# Patient Record
Sex: FEMALE | Race: WHITE | Employment: OTHER | URBAN - METROPOLITAN AREA
[De-identification: names, ages, dates, MRNs, and addresses within clinical notes are randomized per-mention and may not be internally consistent; named-entity substitution may affect disease eponyms.]

---

## 2017-01-19 ENCOUNTER — HOSPITAL ENCOUNTER (OUTPATIENT)
Dept: SLEEP CENTER | Age: 67
Discharge: HOME | End: 2017-01-04
Attending: INTERNAL MEDICINE | Admitting: INTERNAL MEDICINE

## 2017-03-10 ENCOUNTER — HOSPITAL ENCOUNTER (OUTPATIENT)
Dept: SLEEP CENTER | Age: 67
Discharge: HOME | End: 2017-03-10
Attending: INTERNAL MEDICINE | Admitting: INTERNAL MEDICINE

## 2017-04-10 RX ORDER — ATORVASTATIN CALCIUM 20 MG/1
TABLET, FILM COATED ORAL
Qty: 30 TABLET | Refills: 4 | Status: SHIPPED | OUTPATIENT
Start: 2017-04-10 | End: 2017-09-06 | Stop reason: SDUPTHER

## 2017-05-11 RX ORDER — LEVOTHYROXINE SODIUM 112 UG/1
TABLET ORAL
Qty: 30 TABLET | Refills: 2 | Status: SHIPPED | OUTPATIENT
Start: 2017-05-11 | End: 2017-06-16 | Stop reason: SDUPTHER

## 2017-05-16 ENCOUNTER — HOSPITAL ENCOUNTER (OUTPATIENT)
Dept: WOMENS IMAGING | Age: 67
Discharge: HOME | End: 2017-05-16
Attending: NURSE PRACTITIONER | Admitting: NURSE PRACTITIONER

## 2017-05-16 DIAGNOSIS — E78.5 HYPERLIPIDEMIA: ICD-10-CM

## 2017-05-16 DIAGNOSIS — Z13.820 SCREENING FOR OSTEOPOROSIS: ICD-10-CM

## 2017-05-22 DIAGNOSIS — F39 MOOD DISORDER (HCC): ICD-10-CM

## 2017-05-22 RX ORDER — FLUOXETINE 10 MG/1
10 CAPSULE ORAL DAILY
Qty: 30 CAPSULE | Refills: 3 | Status: SHIPPED | OUTPATIENT
Start: 2017-05-22 | End: 2017-09-22 | Stop reason: SDUPTHER

## 2017-06-15 ENCOUNTER — TELEPHONE (OUTPATIENT)
Dept: INTERNAL MEDICINE CLINIC | Age: 67
End: 2017-06-15

## 2017-06-16 ENCOUNTER — OFFICE VISIT (OUTPATIENT)
Dept: INTERNAL MEDICINE CLINIC | Age: 67
End: 2017-06-16

## 2017-06-16 VITALS
BODY MASS INDEX: 40.71 KG/M2 | RESPIRATION RATE: 16 BRPM | HEART RATE: 78 BPM | SYSTOLIC BLOOD PRESSURE: 138 MMHG | DIASTOLIC BLOOD PRESSURE: 68 MMHG | WEIGHT: 229.8 LBS

## 2017-06-16 DIAGNOSIS — K51.90 CHRONIC ULCERATIVE COLITIS, WITHOUT COMPLICATIONS (HCC): ICD-10-CM

## 2017-06-16 DIAGNOSIS — R60.9 FLUID RETENTION: Primary | ICD-10-CM

## 2017-06-16 DIAGNOSIS — E66.01 MORBID OBESITY WITH BMI OF 40.0-44.9, ADULT (HCC): ICD-10-CM

## 2017-06-16 DIAGNOSIS — M85.80 OSTEOPENIA: ICD-10-CM

## 2017-06-16 DIAGNOSIS — R42 DIZZINESS: ICD-10-CM

## 2017-06-16 PROCEDURE — G8399 PT W/DXA RESULTS DOCUMENT: HCPCS | Performed by: INTERNAL MEDICINE

## 2017-06-16 PROCEDURE — 3014F SCREEN MAMMO DOC REV: CPT | Performed by: INTERNAL MEDICINE

## 2017-06-16 PROCEDURE — 99214 OFFICE O/P EST MOD 30 MIN: CPT | Performed by: INTERNAL MEDICINE

## 2017-06-16 PROCEDURE — 4040F PNEUMOC VAC/ADMIN/RCVD: CPT | Performed by: INTERNAL MEDICINE

## 2017-06-16 PROCEDURE — G8419 CALC BMI OUT NRM PARAM NOF/U: HCPCS | Performed by: INTERNAL MEDICINE

## 2017-06-16 PROCEDURE — 1123F ACP DISCUSS/DSCN MKR DOCD: CPT | Performed by: INTERNAL MEDICINE

## 2017-06-16 PROCEDURE — 3017F COLORECTAL CA SCREEN DOC REV: CPT | Performed by: INTERNAL MEDICINE

## 2017-06-16 PROCEDURE — 1036F TOBACCO NON-USER: CPT | Performed by: INTERNAL MEDICINE

## 2017-06-16 PROCEDURE — 1090F PRES/ABSN URINE INCON ASSESS: CPT | Performed by: INTERNAL MEDICINE

## 2017-06-16 PROCEDURE — G8427 DOCREV CUR MEDS BY ELIG CLIN: HCPCS | Performed by: INTERNAL MEDICINE

## 2017-06-16 RX ORDER — HYDROCHLOROTHIAZIDE 12.5 MG/1
12.5 CAPSULE, GELATIN COATED ORAL EVERY OTHER DAY
Qty: 30 CAPSULE | Refills: 3 | Status: SHIPPED | OUTPATIENT
Start: 2017-06-16 | End: 2018-01-18 | Stop reason: DRUGHIGH

## 2017-08-07 RX ORDER — LEVOTHYROXINE SODIUM 112 UG/1
TABLET ORAL
Qty: 30 TABLET | Refills: 1 | Status: SHIPPED | OUTPATIENT
Start: 2017-08-07 | End: 2017-09-06 | Stop reason: SDUPTHER

## 2017-09-07 RX ORDER — ATORVASTATIN CALCIUM 20 MG/1
20 TABLET, FILM COATED ORAL DAILY
Qty: 30 TABLET | Refills: 5 | Status: SHIPPED | OUTPATIENT
Start: 2017-09-07 | End: 2018-03-01 | Stop reason: SDUPTHER

## 2017-09-07 RX ORDER — LEVOTHYROXINE SODIUM 112 UG/1
112 TABLET ORAL DAILY
Qty: 30 TABLET | Refills: 5 | Status: SHIPPED | OUTPATIENT
Start: 2017-09-07 | End: 2017-09-18 | Stop reason: SDUPTHER

## 2017-09-15 ENCOUNTER — TELEPHONE (OUTPATIENT)
Dept: INTERNAL MEDICINE CLINIC | Age: 67
End: 2017-09-15

## 2017-09-18 ENCOUNTER — OFFICE VISIT (OUTPATIENT)
Dept: INTERNAL MEDICINE CLINIC | Age: 67
End: 2017-09-18

## 2017-09-18 VITALS
HEART RATE: 80 BPM | WEIGHT: 228.2 LBS | BODY MASS INDEX: 40.42 KG/M2 | DIASTOLIC BLOOD PRESSURE: 76 MMHG | SYSTOLIC BLOOD PRESSURE: 142 MMHG | RESPIRATION RATE: 16 BRPM

## 2017-09-18 DIAGNOSIS — R03.0 PREHYPERTENSION: Primary | ICD-10-CM

## 2017-09-18 DIAGNOSIS — Z13.1 DIABETES MELLITUS SCREENING: ICD-10-CM

## 2017-09-18 DIAGNOSIS — E03.9 HYPOTHYROIDISM, UNSPECIFIED TYPE: ICD-10-CM

## 2017-09-18 DIAGNOSIS — E78.2 MIXED HYPERLIPIDEMIA: ICD-10-CM

## 2017-09-18 DIAGNOSIS — F33.42 RECURRENT MAJOR DEPRESSIVE EPISODES, IN FULL REMISSION (HCC): ICD-10-CM

## 2017-09-18 PROCEDURE — 1090F PRES/ABSN URINE INCON ASSESS: CPT | Performed by: INTERNAL MEDICINE

## 2017-09-18 PROCEDURE — 3017F COLORECTAL CA SCREEN DOC REV: CPT | Performed by: INTERNAL MEDICINE

## 2017-09-18 PROCEDURE — 99214 OFFICE O/P EST MOD 30 MIN: CPT | Performed by: INTERNAL MEDICINE

## 2017-09-18 PROCEDURE — G8399 PT W/DXA RESULTS DOCUMENT: HCPCS | Performed by: INTERNAL MEDICINE

## 2017-09-18 PROCEDURE — 3014F SCREEN MAMMO DOC REV: CPT | Performed by: INTERNAL MEDICINE

## 2017-09-18 PROCEDURE — G8427 DOCREV CUR MEDS BY ELIG CLIN: HCPCS | Performed by: INTERNAL MEDICINE

## 2017-09-18 PROCEDURE — 1123F ACP DISCUSS/DSCN MKR DOCD: CPT | Performed by: INTERNAL MEDICINE

## 2017-09-18 PROCEDURE — 4040F PNEUMOC VAC/ADMIN/RCVD: CPT | Performed by: INTERNAL MEDICINE

## 2017-09-18 PROCEDURE — 1036F TOBACCO NON-USER: CPT | Performed by: INTERNAL MEDICINE

## 2017-09-18 PROCEDURE — G8417 CALC BMI ABV UP PARAM F/U: HCPCS | Performed by: INTERNAL MEDICINE

## 2017-09-22 DIAGNOSIS — F39 MOOD DISORDER (HCC): ICD-10-CM

## 2017-09-22 RX ORDER — FLUOXETINE 10 MG/1
CAPSULE ORAL
Qty: 30 CAPSULE | Refills: 2 | Status: SHIPPED | OUTPATIENT
Start: 2017-09-22 | End: 2017-12-15 | Stop reason: SDUPTHER

## 2017-12-15 DIAGNOSIS — F39 MOOD DISORDER (HCC): ICD-10-CM

## 2017-12-15 RX ORDER — FLUOXETINE 10 MG/1
CAPSULE ORAL
Qty: 30 CAPSULE | Refills: 3 | Status: SHIPPED | OUTPATIENT
Start: 2017-12-15 | End: 2018-03-22 | Stop reason: SDUPTHER

## 2018-01-18 ENCOUNTER — OFFICE VISIT (OUTPATIENT)
Dept: INTERNAL MEDICINE CLINIC | Age: 68
End: 2018-01-18

## 2018-01-18 VITALS
HEART RATE: 72 BPM | RESPIRATION RATE: 16 BRPM | BODY MASS INDEX: 40.53 KG/M2 | DIASTOLIC BLOOD PRESSURE: 64 MMHG | SYSTOLIC BLOOD PRESSURE: 138 MMHG | WEIGHT: 228.8 LBS

## 2018-01-18 DIAGNOSIS — E03.9 HYPOTHYROIDISM, UNSPECIFIED TYPE: ICD-10-CM

## 2018-01-18 DIAGNOSIS — I10 ESSENTIAL HYPERTENSION: Primary | ICD-10-CM

## 2018-01-18 DIAGNOSIS — K51.30 ULCERATIVE RECTOSIGMOIDITIS WITHOUT COMPLICATION (HCC): ICD-10-CM

## 2018-01-18 PROCEDURE — G8417 CALC BMI ABV UP PARAM F/U: HCPCS | Performed by: INTERNAL MEDICINE

## 2018-01-18 PROCEDURE — 3014F SCREEN MAMMO DOC REV: CPT | Performed by: INTERNAL MEDICINE

## 2018-01-18 PROCEDURE — 99214 OFFICE O/P EST MOD 30 MIN: CPT | Performed by: INTERNAL MEDICINE

## 2018-01-18 PROCEDURE — 1036F TOBACCO NON-USER: CPT | Performed by: INTERNAL MEDICINE

## 2018-01-18 PROCEDURE — 3017F COLORECTAL CA SCREEN DOC REV: CPT | Performed by: INTERNAL MEDICINE

## 2018-01-18 PROCEDURE — G8484 FLU IMMUNIZE NO ADMIN: HCPCS | Performed by: INTERNAL MEDICINE

## 2018-01-18 PROCEDURE — 1090F PRES/ABSN URINE INCON ASSESS: CPT | Performed by: INTERNAL MEDICINE

## 2018-01-18 PROCEDURE — G8399 PT W/DXA RESULTS DOCUMENT: HCPCS | Performed by: INTERNAL MEDICINE

## 2018-01-18 PROCEDURE — 1123F ACP DISCUSS/DSCN MKR DOCD: CPT | Performed by: INTERNAL MEDICINE

## 2018-01-18 PROCEDURE — 4040F PNEUMOC VAC/ADMIN/RCVD: CPT | Performed by: INTERNAL MEDICINE

## 2018-01-18 PROCEDURE — G8427 DOCREV CUR MEDS BY ELIG CLIN: HCPCS | Performed by: INTERNAL MEDICINE

## 2018-01-18 RX ORDER — FLUTICASONE PROPIONATE 50 MCG
2 SPRAY, SUSPENSION (ML) NASAL DAILY
Qty: 1 BOTTLE | Refills: 5 | Status: SHIPPED | OUTPATIENT
Start: 2018-01-18 | End: 2019-02-25 | Stop reason: SDUPTHER

## 2018-01-18 RX ORDER — LOSARTAN POTASSIUM 25 MG/1
25 TABLET ORAL NIGHTLY
Qty: 30 TABLET | Refills: 3 | Status: SHIPPED | OUTPATIENT
Start: 2018-01-18 | End: 2018-05-17 | Stop reason: SDUPTHER

## 2018-01-18 RX ORDER — HYDROCHLOROTHIAZIDE 12.5 MG/1
12.5 CAPSULE, GELATIN COATED ORAL DAILY
COMMUNITY
End: 2018-01-19 | Stop reason: SDUPTHER

## 2018-01-19 RX ORDER — HYDROCHLOROTHIAZIDE 12.5 MG/1
12.5 CAPSULE, GELATIN COATED ORAL DAILY
Qty: 30 CAPSULE | Refills: 5 | Status: SHIPPED | OUTPATIENT
Start: 2018-01-19 | End: 2018-07-21 | Stop reason: SDUPTHER

## 2018-02-27 ENCOUNTER — HOSPITAL ENCOUNTER (OUTPATIENT)
Dept: LAB | Age: 68
Discharge: HOME | End: 2018-02-27
Attending: INTERNAL MEDICINE | Admitting: INTERNAL MEDICINE

## 2018-02-27 LAB
ANION GAP SERPL CALCULATED.3IONS-SCNC: 10 MMOL/L (ref 4–16)
BUN BLDV-MCNC: 13 MG/DL (ref 6–23)
CALCIUM SERPL-MCNC: 9.9 MG/DL (ref 8.3–10.6)
CHLORIDE BLD-SCNC: 100 MMOL/L (ref 99–110)
CO2: 32 MMOL/L (ref 21–32)
CREAT SERPL-MCNC: 0.8 MG/DL (ref 0.6–1.1)
GFR AFRICAN AMERICAN: >60 ML/MIN/1.73M2
GFR NON-AFRICAN AMERICAN: >60 ML/MIN/1.73M2
GLUCOSE BLD-MCNC: 114 MG/DL (ref 70–99)
POTASSIUM SERPL-SCNC: 4.3 MMOL/L (ref 3.5–5.1)
SODIUM BLD-SCNC: 142 MMOL/L (ref 135–145)

## 2018-03-01 RX ORDER — ATORVASTATIN CALCIUM 20 MG/1
20 TABLET, FILM COATED ORAL DAILY
Qty: 30 TABLET | Refills: 5 | Status: SHIPPED | OUTPATIENT
Start: 2018-03-01 | End: 2018-09-04 | Stop reason: SDUPTHER

## 2018-03-22 ENCOUNTER — OFFICE VISIT (OUTPATIENT)
Dept: INTERNAL MEDICINE CLINIC | Age: 68
End: 2018-03-22

## 2018-03-22 VITALS
HEART RATE: 76 BPM | BODY MASS INDEX: 39.35 KG/M2 | HEIGHT: 64 IN | DIASTOLIC BLOOD PRESSURE: 80 MMHG | RESPIRATION RATE: 16 BRPM | WEIGHT: 230.5 LBS | SYSTOLIC BLOOD PRESSURE: 132 MMHG

## 2018-03-22 DIAGNOSIS — I10 ESSENTIAL HYPERTENSION: Primary | ICD-10-CM

## 2018-03-22 DIAGNOSIS — F32.A ANXIETY AND DEPRESSION: ICD-10-CM

## 2018-03-22 DIAGNOSIS — F41.9 ANXIETY AND DEPRESSION: ICD-10-CM

## 2018-03-22 DIAGNOSIS — K51.30 ULCERATIVE RECTOSIGMOIDITIS WITHOUT COMPLICATION (HCC): ICD-10-CM

## 2018-03-22 DIAGNOSIS — F39 MOOD DISORDER (HCC): ICD-10-CM

## 2018-03-22 DIAGNOSIS — R73.03 PREDIABETES: ICD-10-CM

## 2018-03-22 DIAGNOSIS — E03.9 HYPOTHYROIDISM, UNSPECIFIED TYPE: ICD-10-CM

## 2018-03-22 DIAGNOSIS — E66.01 OBESITY, CLASS III, BMI 40-49.9 (MORBID OBESITY) (HCC): ICD-10-CM

## 2018-03-22 DIAGNOSIS — E78.2 MIXED HYPERLIPIDEMIA: ICD-10-CM

## 2018-03-22 DIAGNOSIS — M67.40 MUCOID CYST OF JOINT: ICD-10-CM

## 2018-03-22 PROBLEM — E66.813 OBESITY, CLASS III, BMI 40-49.9 (MORBID OBESITY) (HCC): Status: ACTIVE | Noted: 2018-01-01

## 2018-03-22 PROCEDURE — G8427 DOCREV CUR MEDS BY ELIG CLIN: HCPCS | Performed by: INTERNAL MEDICINE

## 2018-03-22 PROCEDURE — 1090F PRES/ABSN URINE INCON ASSESS: CPT | Performed by: INTERNAL MEDICINE

## 2018-03-22 PROCEDURE — 3014F SCREEN MAMMO DOC REV: CPT | Performed by: INTERNAL MEDICINE

## 2018-03-22 PROCEDURE — 1036F TOBACCO NON-USER: CPT | Performed by: INTERNAL MEDICINE

## 2018-03-22 PROCEDURE — 99214 OFFICE O/P EST MOD 30 MIN: CPT | Performed by: INTERNAL MEDICINE

## 2018-03-22 PROCEDURE — 1123F ACP DISCUSS/DSCN MKR DOCD: CPT | Performed by: INTERNAL MEDICINE

## 2018-03-22 PROCEDURE — 3017F COLORECTAL CA SCREEN DOC REV: CPT | Performed by: INTERNAL MEDICINE

## 2018-03-22 PROCEDURE — G8399 PT W/DXA RESULTS DOCUMENT: HCPCS | Performed by: INTERNAL MEDICINE

## 2018-03-22 PROCEDURE — G8482 FLU IMMUNIZE ORDER/ADMIN: HCPCS | Performed by: INTERNAL MEDICINE

## 2018-03-22 PROCEDURE — 4040F PNEUMOC VAC/ADMIN/RCVD: CPT | Performed by: INTERNAL MEDICINE

## 2018-03-22 PROCEDURE — G8417 CALC BMI ABV UP PARAM F/U: HCPCS | Performed by: INTERNAL MEDICINE

## 2018-03-22 RX ORDER — FLUOXETINE 10 MG/1
10 CAPSULE ORAL DAILY
Qty: 30 CAPSULE | Refills: 3 | Status: SHIPPED | OUTPATIENT
Start: 2018-03-22 | End: 2018-08-13 | Stop reason: SDUPTHER

## 2018-03-22 ASSESSMENT — PATIENT HEALTH QUESTIONNAIRE - PHQ9
SUM OF ALL RESPONSES TO PHQ9 QUESTIONS 1 & 2: 1
2. FEELING DOWN, DEPRESSED OR HOPELESS: 0
1. LITTLE INTEREST OR PLEASURE IN DOING THINGS: 1
SUM OF ALL RESPONSES TO PHQ QUESTIONS 1-9: 1

## 2018-04-12 RX ORDER — LEVOTHYROXINE SODIUM 112 UG/1
TABLET ORAL
Qty: 30 TABLET | Refills: 3 | Status: SHIPPED | OUTPATIENT
Start: 2018-04-12 | End: 2018-08-06 | Stop reason: SDUPTHER

## 2018-04-19 ENCOUNTER — OFFICE VISIT (OUTPATIENT)
Dept: INTERNAL MEDICINE CLINIC | Age: 68
End: 2018-04-19

## 2018-04-19 ENCOUNTER — HOSPITAL ENCOUNTER (OUTPATIENT)
Dept: GENERAL RADIOLOGY | Age: 68
Discharge: HOME | End: 2018-04-19
Attending: INTERNAL MEDICINE | Admitting: INTERNAL MEDICINE

## 2018-04-19 VITALS
HEART RATE: 64 BPM | DIASTOLIC BLOOD PRESSURE: 70 MMHG | WEIGHT: 217.6 LBS | RESPIRATION RATE: 16 BRPM | SYSTOLIC BLOOD PRESSURE: 142 MMHG | TEMPERATURE: 98.7 F | BODY MASS INDEX: 37.94 KG/M2

## 2018-04-19 DIAGNOSIS — J45.41 MODERATE PERSISTENT ASTHMA WITH EXACERBATION: ICD-10-CM

## 2018-04-19 DIAGNOSIS — J45.41 MODERATE PERSISTENT ASTHMATIC BRONCHITIS WITH ACUTE EXACERBATION: Primary | ICD-10-CM

## 2018-04-19 DIAGNOSIS — K21.9 GASTROESOPHAGEAL REFLUX DISEASE, ESOPHAGITIS PRESENCE NOT SPECIFIED: ICD-10-CM

## 2018-04-19 DIAGNOSIS — D84.9 IMMUNOCOMPROMISED (HCC): ICD-10-CM

## 2018-04-19 PROCEDURE — 3017F COLORECTAL CA SCREEN DOC REV: CPT | Performed by: INTERNAL MEDICINE

## 2018-04-19 PROCEDURE — 1123F ACP DISCUSS/DSCN MKR DOCD: CPT | Performed by: INTERNAL MEDICINE

## 2018-04-19 PROCEDURE — 1036F TOBACCO NON-USER: CPT | Performed by: INTERNAL MEDICINE

## 2018-04-19 PROCEDURE — 99213 OFFICE O/P EST LOW 20 MIN: CPT | Performed by: INTERNAL MEDICINE

## 2018-04-19 PROCEDURE — 3014F SCREEN MAMMO DOC REV: CPT | Performed by: INTERNAL MEDICINE

## 2018-04-19 PROCEDURE — 1090F PRES/ABSN URINE INCON ASSESS: CPT | Performed by: INTERNAL MEDICINE

## 2018-04-19 PROCEDURE — G8417 CALC BMI ABV UP PARAM F/U: HCPCS | Performed by: INTERNAL MEDICINE

## 2018-04-19 PROCEDURE — G8427 DOCREV CUR MEDS BY ELIG CLIN: HCPCS | Performed by: INTERNAL MEDICINE

## 2018-04-19 PROCEDURE — G8399 PT W/DXA RESULTS DOCUMENT: HCPCS | Performed by: INTERNAL MEDICINE

## 2018-04-19 PROCEDURE — 4040F PNEUMOC VAC/ADMIN/RCVD: CPT | Performed by: INTERNAL MEDICINE

## 2018-04-19 RX ORDER — AZITHROMYCIN 250 MG/1
TABLET, FILM COATED ORAL
Qty: 1 PACKET | Refills: 0 | Status: SHIPPED | OUTPATIENT
Start: 2018-04-19 | End: 2018-04-29

## 2018-04-19 RX ORDER — METHYLPREDNISOLONE 4 MG/1
TABLET ORAL
Qty: 1 KIT | Refills: 0 | Status: SHIPPED | OUTPATIENT
Start: 2018-04-19 | End: 2018-07-03 | Stop reason: ALTCHOICE

## 2018-04-19 RX ORDER — PANTOPRAZOLE SODIUM 40 MG/1
40 TABLET, DELAYED RELEASE ORAL DAILY
Qty: 30 TABLET | Refills: 3 | Status: SHIPPED | OUTPATIENT
Start: 2018-04-19 | End: 2018-07-03 | Stop reason: ALTCHOICE

## 2018-05-17 RX ORDER — LOSARTAN POTASSIUM 25 MG/1
25 TABLET ORAL NIGHTLY
Qty: 30 TABLET | Refills: 2 | Status: SHIPPED | OUTPATIENT
Start: 2018-05-17 | End: 2018-08-14 | Stop reason: SDUPTHER

## 2018-05-21 ENCOUNTER — TELEPHONE (OUTPATIENT)
Dept: INTERNAL MEDICINE CLINIC | Age: 68
End: 2018-05-21

## 2018-06-05 ENCOUNTER — TELEPHONE (OUTPATIENT)
Dept: INTERNAL MEDICINE CLINIC | Age: 68
End: 2018-06-05

## 2018-06-07 ENCOUNTER — TELEPHONE (OUTPATIENT)
Dept: INTERNAL MEDICINE CLINIC | Age: 68
End: 2018-06-07

## 2018-06-07 RX ORDER — AZITHROMYCIN 250 MG/1
TABLET, FILM COATED ORAL
Qty: 1 PACKET | Refills: 0 | Status: SHIPPED | OUTPATIENT
Start: 2018-06-07 | End: 2018-06-11

## 2018-06-07 RX ORDER — PREDNISONE 10 MG/1
TABLET ORAL
Qty: 4 TABLET | Refills: 0 | Status: SHIPPED | OUTPATIENT
Start: 2018-06-07 | End: 2018-07-03 | Stop reason: ALTCHOICE

## 2018-06-14 LAB
BASOPHILS ABSOLUTE: 0 K/MM3 (ref 0–0.3)
BASOPHILS RELATIVE PERCENT: 0.8 % (ref 0–2)
BUN / CREAT RATIO: 14 (CALC) (ref 7–25)
BUN BLDV-MCNC: 11 MG/DL (ref 3–29)
CALCIUM SERPL-MCNC: 9.6 MG/DL (ref 8.5–10.5)
CHLORIDE BLD-SCNC: 102 MEQ/L (ref 96–110)
CHOLESTEROL, TOTAL: 151 MG/DL
CO2: 24 MEQ/L (ref 19–32)
CREAT SERPL-MCNC: 0.8 MG/DL
EOSINOPHILS ABSOLUTE: 0.2 K/MM3 (ref 0–0.6)
EOSINOPHILS RELATIVE PERCENT: 3 % (ref 0–7)
GFR SERPL CREATININE-BSD FRML MDRD: 75 ML/MIN/1.73M2
GLUCOSE BLD-MCNC: 111 MG/DL
HCT VFR BLD CALC: 39.5 % (ref 35–46)
HDLC SERPL-MCNC: 60 MG/DL
HEMOGLOBIN: 13.5 G/DL (ref 12–15.6)
LDL CHOLESTEROL: 57 MG/DL (CALC)
LEUKOCYTES, UA: 5.5 K/MM3 (ref 3.8–10.8)
LYMPHOCYTES ABSOLUTE: 2.4 K/MM3 (ref 0.9–4.1)
LYMPHOCYTES RELATIVE PERCENT: 43.7 % (ref 14–51)
MCH RBC QN AUTO: 31.1 PG (ref 27–33)
MCHC RBC AUTO-ENTMCNC: 34.1 G/DL (ref 32–36)
MCV RBC AUTO: 91.3 FL (ref 80–100)
MONOCYTES ABSOLUTE: 0.8 K/MM3 (ref 0.2–1.1)
MONOCYTES RELATIVE PERCENT: 14.3 % (ref 0–14)
NEUTROPHILS ABSOLUTE: 2.1 K/MM3 (ref 1.5–7.8)
PDW BLD-RTO: 13.7 % (ref 9–15)
PLATELET # BLD: 306 K/MM3 (ref 130–400)
POTASSIUM SERPL-SCNC: 4.6 MEQ/L (ref 3.4–5.3)
RBC # BLD: 4.33 M/MM3 (ref 3.9–5.2)
SEGMENTED NEUTROPHILS RELATIVE PERCENT: 38.2 % (ref 40–76)
SODIUM BLD-SCNC: 141 MEQ/L (ref 135–148)
TRIGL SERPL-MCNC: 168 MG/DL
VLDLC SERPL CALC-MCNC: 34 MG/DL (CALC) (ref 4–38)

## 2018-07-03 ENCOUNTER — OFFICE VISIT (OUTPATIENT)
Dept: INTERNAL MEDICINE CLINIC | Age: 68
End: 2018-07-03

## 2018-07-03 VITALS
SYSTOLIC BLOOD PRESSURE: 120 MMHG | RESPIRATION RATE: 16 BRPM | WEIGHT: 222.6 LBS | DIASTOLIC BLOOD PRESSURE: 60 MMHG | HEART RATE: 78 BPM | OXYGEN SATURATION: 96 % | BODY MASS INDEX: 38.81 KG/M2

## 2018-07-03 DIAGNOSIS — I10 ESSENTIAL HYPERTENSION: Primary | ICD-10-CM

## 2018-07-03 DIAGNOSIS — Z12.39 BREAST CANCER SCREENING: ICD-10-CM

## 2018-07-03 DIAGNOSIS — I35.0 NONRHEUMATIC AORTIC VALVE STENOSIS: ICD-10-CM

## 2018-07-03 PROCEDURE — G8399 PT W/DXA RESULTS DOCUMENT: HCPCS | Performed by: INTERNAL MEDICINE

## 2018-07-03 PROCEDURE — 1036F TOBACCO NON-USER: CPT | Performed by: INTERNAL MEDICINE

## 2018-07-03 PROCEDURE — G8427 DOCREV CUR MEDS BY ELIG CLIN: HCPCS | Performed by: INTERNAL MEDICINE

## 2018-07-03 PROCEDURE — 3017F COLORECTAL CA SCREEN DOC REV: CPT | Performed by: INTERNAL MEDICINE

## 2018-07-03 PROCEDURE — 1123F ACP DISCUSS/DSCN MKR DOCD: CPT | Performed by: INTERNAL MEDICINE

## 2018-07-03 PROCEDURE — 99214 OFFICE O/P EST MOD 30 MIN: CPT | Performed by: INTERNAL MEDICINE

## 2018-07-03 PROCEDURE — 4040F PNEUMOC VAC/ADMIN/RCVD: CPT | Performed by: INTERNAL MEDICINE

## 2018-07-03 PROCEDURE — 1090F PRES/ABSN URINE INCON ASSESS: CPT | Performed by: INTERNAL MEDICINE

## 2018-07-03 PROCEDURE — G8417 CALC BMI ABV UP PARAM F/U: HCPCS | Performed by: INTERNAL MEDICINE

## 2018-07-03 PROCEDURE — 93000 ELECTROCARDIOGRAM COMPLETE: CPT | Performed by: INTERNAL MEDICINE

## 2018-07-03 NOTE — PROGRESS NOTES
Subjective:      Margarette Rasheed is a 69 y.o. female who presents today for follow up on her chronic medical conditions as noted below. Patient Active Problem List:     Colon cancer screening     Breast cancer screening     Colitis, ulcerative ([facility] [place] [address]     Anxiety and depression     Osteopenia     Hypertension     Hypothyroidism     Obesity, Class III, BMI 40-49.9 (morbid obesity) ([facility] [place] [address]     Prediabetes     She was last seen in April with resp infection  This completely resolved    bp is fine  The patient is taking hypertensive medications compliantly without side effects. Denies chest pain, dyspnea, edema, or TIA's. She has new murmur of aortic stenosis  Last echo 2014;     IMPRESSION:  1. Mild left ventricular hypertrophy noted. 2.  Ejection fraction is around 50% to 55%. 3.  Mild mitral and tricuspid regurgitation noted. 4.  The aortic valve is sclerotic but not stenotic. 5.  Diastolic function is preserved. 6.  Mild mitral and tricuspid regurgitation is present.      Will repeat echo and EKG    She is taking probiotic and has hx of ELLIE and remicade  Asked her to discuss with Jordin the safety of probiotic in immunocompromised host    Current Outpatient Prescriptions   Medication Sig Dispense Refill    losartan (COZAAR) 25 MG tablet TAKE 1 TABLET BY MOUTH NIGHTLY 30 tablet 2    levothyroxine (SYNTHROID) 112 MCG tablet TAKE ONE (1) TABLET BY MOUTH ONCE DAILY 30 tablet 3    FLUoxetine (PROZAC) 10 MG capsule Take 1 capsule by mouth daily 30 capsule 3    atorvastatin (LIPITOR) 20 MG tablet Take 1 tablet by mouth daily 30 tablet 5    hydrochlorothiazide (MICROZIDE) 12.5 MG capsule Take 1 capsule by mouth daily 30 capsule 5    fluticasone (FLONASE) 50 MCG/ACT nasal spray 2 sprays by Nasal route daily 1 Bottle 5    albuterol sulfate HFA (PROAIR HFA) 108 (90 BASE) MCG/ACT inhaler Inhale 2 puffs into the lungs every 6 hours as needed for Wheezing 1 Inhaler 3    fluocinonide (LIDEX) 0.05 % cream Apply topically 2 times daily. 30 g 1    minoxidil (MINOXIDIL FOR WOMEN) 2 % external solution Apply  topically 2 times daily. Apply topically 2 times daily.  mesalamine (LIALDA) 1.2 G EC tablet Take 2,400 mg by mouth daily       InFLIXimab (REMICADE IV) Infuse  intravenously. Beginning ; will be every 8 wk      lactobacillus rhamnosus, GG, (CULTURELLE) capsule Take 1 capsule by mouth 2 times daily (with meals). 60 capsule 0    calcium-vitamin D (OSCAL-500) 500-200 MG-UNIT per tablet Take 1 tablet by mouth daily. No current facility-administered medications for this visit. Past Medical History:   Diagnosis Date    Anxiety and depression 2010    Asthma     Colitis, ulcerative ([facility] [address] 1999    Jordin Omer    Hyperlipidemia     Hypertension     Hypothyroidism     Obesity, Class III, BMI 40-49.9 (morbid obesity) ([facility] [address] 2018    Osteopenia 2017    Prediabetes     Rib fracture 6/2014    10 th right side from syncope        Social History   Substance Use Topics    Smoking status: Never Smoker    Smokeless tobacco: Never Used    Alcohol use Yes      Comment: every 2-3 months        ROS: The patient has had no headache, sore throat, fever or chills, cough, dyspnea, chest pain, nausea, vomiting or diarrhea, or edema. Objective:      /60   Pulse 78   Resp 16   Wt 222 lb 9.6 oz (101 kg)   SpO2 96%   BMI 38.81 kg/m²    General: in no apparent distress   The patient's neck is free of nodes. Lungs are clear. Heart is normal in rate and regular in rhythm. Legs are free of edema. No rash or erythema. June lab rev'd    EKG today: Sinus  Rhythm   -Nonspecific ST depression  -Nondiagnostic. ABNORMAL      Assessment / Plan:      1. Essential hypertension    2. Nonrheumatic aortic valve stenosis    3. Breast cancer screening            Plan   Ordered echo at [facility] for murmur of AS  Same meds  ?  Probiotic  RTC 4 mo  Orders Placed This Encounter   Procedures    DANII Screening Bilateral    EKG 12 Lead    ECHO Complete 2D W Doppler W Color     Mammogram is overdue; last 2016

## 2018-07-05 ENCOUNTER — TELEPHONE (OUTPATIENT)
Dept: CARDIOLOGY CLINIC | Age: 68
End: 2018-07-05

## 2018-07-12 DIAGNOSIS — I35.0 NODULAR CALCIFIC AORTIC VALVE STENOSIS: Primary | ICD-10-CM

## 2018-07-13 ENCOUNTER — HOSPITAL ENCOUNTER (OUTPATIENT)
Dept: INFUSION THERAPY | Age: 68
Discharge: HOME | End: 2018-07-13
Attending: INTERNAL MEDICINE | Admitting: INTERNAL MEDICINE

## 2018-07-13 VITALS
HEIGHT: 63 IN | OXYGEN SATURATION: 97 % | WEIGHT: 220 LBS | TEMPERATURE: 98.4 F | BODY MASS INDEX: 38.98 KG/M2 | DIASTOLIC BLOOD PRESSURE: 62 MMHG | HEART RATE: 62 BPM | SYSTOLIC BLOOD PRESSURE: 135 MMHG | RESPIRATION RATE: 16 BRPM

## 2018-07-13 RX ORDER — 0.9 % SODIUM CHLORIDE 0.9 %
10 VIAL (ML) INJECTION PRN
Status: DISCONTINUED | OUTPATIENT
Start: 2018-07-13 | End: 2018-07-14 | Stop reason: HOSPADM

## 2018-07-13 RX ORDER — SODIUM CHLORIDE 9 MG/ML
INJECTION, SOLUTION INTRAVENOUS CONTINUOUS
Status: DISCONTINUED | OUTPATIENT
Start: 2018-07-13 | End: 2018-07-14 | Stop reason: HOSPADM

## 2018-07-13 RX ADMIN — Medication 10 ML: at 11:20

## 2018-07-13 RX ADMIN — Medication 10 ML: at 09:10

## 2018-07-13 RX ADMIN — Medication 10 ML: at 08:20

## 2018-07-13 RX ADMIN — SODIUM CHLORIDE: 9 INJECTION, SOLUTION INTRAVENOUS at 09:05

## 2018-07-13 NOTE — PLAN OF CARE
Ambulatory to unit room 2 for Remicade. Orientated to unit. Procedure and plan of care explained. Questions answered. Understanding verbalized.

## 2018-07-20 ENCOUNTER — PROCEDURE VISIT (OUTPATIENT)
Dept: CARDIOLOGY CLINIC | Age: 68
End: 2018-07-20

## 2018-07-20 DIAGNOSIS — I35.0 NODULAR CALCIFIC AORTIC VALVE STENOSIS: Primary | ICD-10-CM

## 2018-07-20 LAB
LV EF: 58 %
LVEF MODALITY: NORMAL

## 2018-07-20 PROCEDURE — 93306 TTE W/DOPPLER COMPLETE: CPT | Performed by: INTERNAL MEDICINE

## 2018-07-23 RX ORDER — HYDROCHLOROTHIAZIDE 12.5 MG/1
CAPSULE, GELATIN COATED ORAL
Qty: 30 CAPSULE | Refills: 5 | Status: SHIPPED | OUTPATIENT
Start: 2018-07-23 | End: 2019-01-14 | Stop reason: SDUPTHER

## 2018-08-06 RX ORDER — LEVOTHYROXINE SODIUM 112 UG/1
TABLET ORAL
Qty: 30 TABLET | Refills: 5 | Status: SHIPPED | OUTPATIENT
Start: 2018-08-06 | End: 2019-02-02 | Stop reason: SDUPTHER

## 2018-08-13 DIAGNOSIS — F39 MOOD DISORDER (HCC): ICD-10-CM

## 2018-08-14 RX ORDER — LOSARTAN POTASSIUM 25 MG/1
25 TABLET ORAL NIGHTLY
Qty: 30 TABLET | Refills: 3 | Status: SHIPPED | OUTPATIENT
Start: 2018-08-14 | End: 2018-11-30 | Stop reason: SDUPTHER

## 2018-08-14 RX ORDER — FLUOXETINE 10 MG/1
10 CAPSULE ORAL DAILY
Qty: 30 CAPSULE | Refills: 3 | Status: SHIPPED | OUTPATIENT
Start: 2018-08-14 | End: 2018-11-30 | Stop reason: SDUPTHER

## 2018-09-04 RX ORDER — ATORVASTATIN CALCIUM 20 MG/1
TABLET, FILM COATED ORAL
Qty: 30 TABLET | Refills: 4 | Status: SHIPPED | OUTPATIENT
Start: 2018-09-04 | End: 2019-02-02 | Stop reason: SDUPTHER

## 2018-09-07 ENCOUNTER — HOSPITAL ENCOUNTER (OUTPATIENT)
Dept: INFUSION THERAPY | Age: 68
Discharge: HOME | End: 2018-09-07
Attending: INTERNAL MEDICINE | Admitting: INTERNAL MEDICINE

## 2018-09-07 VITALS
BODY MASS INDEX: 37.05 KG/M2 | SYSTOLIC BLOOD PRESSURE: 143 MMHG | TEMPERATURE: 98.3 F | WEIGHT: 217 LBS | RESPIRATION RATE: 16 BRPM | DIASTOLIC BLOOD PRESSURE: 65 MMHG | HEIGHT: 64 IN | HEART RATE: 79 BPM

## 2018-09-07 RX ORDER — 0.9 % SODIUM CHLORIDE 0.9 %
500 INTRAVENOUS SOLUTION INTRAVENOUS ONCE
Status: DISCONTINUED | OUTPATIENT
Start: 2018-09-07 | End: 2018-09-08 | Stop reason: HOSPADM

## 2018-09-07 RX ORDER — ACETAMINOPHEN 500 MG
1000 TABLET ORAL ONCE
Status: COMPLETED | OUTPATIENT
Start: 2018-09-07 | End: 2018-09-07

## 2018-09-07 RX ORDER — DIPHENHYDRAMINE HCL 25 MG
25 TABLET ORAL ONCE
Status: DISCONTINUED | OUTPATIENT
Start: 2018-09-07 | End: 2018-09-08 | Stop reason: HOSPADM

## 2018-09-07 RX ADMIN — Medication 1000 MG: at 08:26

## 2018-09-07 RX ADMIN — Medication 500 ML: at 08:26

## 2018-09-07 ASSESSMENT — PAIN SCALES - GENERAL: PAINLEVEL_OUTOF10: 0

## 2018-10-03 ENCOUNTER — OFFICE VISIT (OUTPATIENT)
Dept: INTERNAL MEDICINE CLINIC | Age: 68
End: 2018-10-03
Payer: MEDICARE

## 2018-10-03 VITALS
WEIGHT: 221 LBS | OXYGEN SATURATION: 98 % | SYSTOLIC BLOOD PRESSURE: 120 MMHG | RESPIRATION RATE: 16 BRPM | HEART RATE: 81 BPM | BODY MASS INDEX: 38.53 KG/M2 | DIASTOLIC BLOOD PRESSURE: 78 MMHG

## 2018-10-03 DIAGNOSIS — I10 ESSENTIAL HYPERTENSION: Primary | ICD-10-CM

## 2018-10-03 DIAGNOSIS — E78.2 MIXED HYPERLIPIDEMIA: ICD-10-CM

## 2018-10-03 DIAGNOSIS — K51.30 ULCERATIVE RECTOSIGMOIDITIS WITHOUT COMPLICATION (HCC): ICD-10-CM

## 2018-10-03 DIAGNOSIS — R73.03 PREDIABETES: ICD-10-CM

## 2018-10-03 DIAGNOSIS — E03.9 HYPOTHYROIDISM, UNSPECIFIED TYPE: ICD-10-CM

## 2018-10-03 PROBLEM — I35.0 MILD AORTIC STENOSIS: Status: ACTIVE | Noted: 2018-07-01

## 2018-10-03 PROCEDURE — 1036F TOBACCO NON-USER: CPT | Performed by: INTERNAL MEDICINE

## 2018-10-03 PROCEDURE — 4040F PNEUMOC VAC/ADMIN/RCVD: CPT | Performed by: INTERNAL MEDICINE

## 2018-10-03 PROCEDURE — G8427 DOCREV CUR MEDS BY ELIG CLIN: HCPCS | Performed by: INTERNAL MEDICINE

## 2018-10-03 PROCEDURE — 99213 OFFICE O/P EST LOW 20 MIN: CPT | Performed by: INTERNAL MEDICINE

## 2018-10-03 PROCEDURE — 1090F PRES/ABSN URINE INCON ASSESS: CPT | Performed by: INTERNAL MEDICINE

## 2018-10-03 PROCEDURE — G8482 FLU IMMUNIZE ORDER/ADMIN: HCPCS | Performed by: INTERNAL MEDICINE

## 2018-10-03 PROCEDURE — G8399 PT W/DXA RESULTS DOCUMENT: HCPCS | Performed by: INTERNAL MEDICINE

## 2018-10-03 PROCEDURE — 1101F PT FALLS ASSESS-DOCD LE1/YR: CPT | Performed by: INTERNAL MEDICINE

## 2018-10-03 PROCEDURE — G8417 CALC BMI ABV UP PARAM F/U: HCPCS | Performed by: INTERNAL MEDICINE

## 2018-10-03 PROCEDURE — 3017F COLORECTAL CA SCREEN DOC REV: CPT | Performed by: INTERNAL MEDICINE

## 2018-10-03 PROCEDURE — 1123F ACP DISCUSS/DSCN MKR DOCD: CPT | Performed by: INTERNAL MEDICINE

## 2018-10-03 NOTE — PROGRESS NOTES
tablet Take 2,400 mg by mouth daily       InFLIXimab (REMICADE IV) Infuse  intravenously. Beginning ; will be every 8 wk      lactobacillus rhamnosus, GG, (CULTURELLE) capsule Take 1 capsule by mouth 2 times daily (with meals). 60 capsule 0    calcium-vitamin D (OSCAL-500) 500-200 MG-UNIT per tablet Take 1 tablet by mouth daily. No current facility-administered medications for this visit. Past Medical History:   Diagnosis Date    Anxiety and depression 2010    Asthma     Colitis, ulcerative ([facility] [address] 1999    Jordin Omer    Hyperlipidemia     Hypertension     Hypothyroidism     Mild aortic stenosis 07/2018 7/2018, echo    Obesity, Class III, BMI 40-49.9 (morbid obesity) ([facility] [address] 2018    Osteopenia 2017    Prediabetes     Rib fracture 6/2014    10 th right side from syncope        Social History   Substance Use Topics    Smoking status: Never Smoker    Smokeless tobacco: Never Used    Alcohol use Yes      Comment: every 2-3 months        ROS: The patient has had no headache, sore throat, fever or chills, cough, dyspnea, chest pain, nausea, vomiting or diarrhea, or edema. Objective:      /78   Pulse 81   Resp 16   Wt 221 lb (100.2 kg)   SpO2 98%   BMI 38.53 kg/m²    General: in no apparent distress   The patient's neck is free of nodes. Lungs are clear. Heart is normal in rate and regular in rhythm. Legs are free of edema. No rash or erythema. Prior lab rev'd  Echo rev'd     Assessment / Plan:      1. Essential hypertension    2. Prediabetes    3. Ulcerative rectosigmoiditis without complication ([facility] [address]    4. Hypothyroidism, unspecified type    5.  Mixed hyperlipidemia            Plan   Diet   Same meds  RTC 4 mo, lab first  Orders Placed This Encounter   Procedures    CBC Auto Differential    Comprehensive Metabolic Panel    Lipid Panel    Hemoglobin A1C    TSH without Reflex     Get mammograms

## 2018-10-29 ENCOUNTER — OFFICE VISIT (OUTPATIENT)
Dept: INTERNAL MEDICINE CLINIC | Age: 68
End: 2018-10-29
Payer: MEDICARE

## 2018-10-29 VITALS
WEIGHT: 223.8 LBS | SYSTOLIC BLOOD PRESSURE: 128 MMHG | BODY MASS INDEX: 39.02 KG/M2 | DIASTOLIC BLOOD PRESSURE: 54 MMHG | HEART RATE: 77 BPM | OXYGEN SATURATION: 95 % | RESPIRATION RATE: 16 BRPM

## 2018-10-29 DIAGNOSIS — S00.31XA ABRASION OF NOSE, INITIAL ENCOUNTER: Primary | ICD-10-CM

## 2018-10-29 PROCEDURE — 1123F ACP DISCUSS/DSCN MKR DOCD: CPT | Performed by: INTERNAL MEDICINE

## 2018-10-29 PROCEDURE — 1036F TOBACCO NON-USER: CPT | Performed by: INTERNAL MEDICINE

## 2018-10-29 PROCEDURE — 4040F PNEUMOC VAC/ADMIN/RCVD: CPT | Performed by: INTERNAL MEDICINE

## 2018-10-29 PROCEDURE — 1101F PT FALLS ASSESS-DOCD LE1/YR: CPT | Performed by: INTERNAL MEDICINE

## 2018-10-29 PROCEDURE — 3017F COLORECTAL CA SCREEN DOC REV: CPT | Performed by: INTERNAL MEDICINE

## 2018-10-29 PROCEDURE — G8482 FLU IMMUNIZE ORDER/ADMIN: HCPCS | Performed by: INTERNAL MEDICINE

## 2018-10-29 PROCEDURE — 1090F PRES/ABSN URINE INCON ASSESS: CPT | Performed by: INTERNAL MEDICINE

## 2018-10-29 PROCEDURE — G8417 CALC BMI ABV UP PARAM F/U: HCPCS | Performed by: INTERNAL MEDICINE

## 2018-10-29 PROCEDURE — G8427 DOCREV CUR MEDS BY ELIG CLIN: HCPCS | Performed by: INTERNAL MEDICINE

## 2018-10-29 PROCEDURE — 99212 OFFICE O/P EST SF 10 MIN: CPT | Performed by: INTERNAL MEDICINE

## 2018-10-29 PROCEDURE — G8399 PT W/DXA RESULTS DOCUMENT: HCPCS | Performed by: INTERNAL MEDICINE

## 2018-11-02 ENCOUNTER — HOSPITAL ENCOUNTER (OUTPATIENT)
Dept: INFUSION THERAPY | Age: 68
Setting detail: INFUSION SERIES
Discharge: HOME | End: 2018-11-02
Payer: MEDICARE

## 2018-11-02 VITALS
RESPIRATION RATE: 18 BRPM | WEIGHT: 223 LBS | DIASTOLIC BLOOD PRESSURE: 63 MMHG | HEART RATE: 63 BPM | BODY MASS INDEX: 38.88 KG/M2 | SYSTOLIC BLOOD PRESSURE: 130 MMHG | TEMPERATURE: 98.2 F

## 2018-11-02 PROCEDURE — 96374 THER/PROPH/DIAG INJ IV PUSH: CPT

## 2018-11-02 PROCEDURE — 96365 THER/PROPH/DIAG IV INF INIT: CPT

## 2018-11-02 PROCEDURE — 2580000003 HC RX 258

## 2018-11-02 PROCEDURE — 96366 THER/PROPH/DIAG IV INF ADDON: CPT

## 2018-11-02 PROCEDURE — 6360000002 HC RX W HCPCS: Performed by: INTERNAL MEDICINE

## 2018-11-02 PROCEDURE — 6370000000 HC RX 637 (ALT 250 FOR IP): Performed by: INTERNAL MEDICINE

## 2018-11-02 PROCEDURE — 2580000003 HC RX 258: Performed by: INTERNAL MEDICINE

## 2018-11-02 PROCEDURE — 96375 TX/PRO/DX INJ NEW DRUG ADDON: CPT

## 2018-11-02 RX ORDER — ACETAMINOPHEN 500 MG
1000 TABLET ORAL ONCE
Status: COMPLETED | OUTPATIENT
Start: 2018-11-02 | End: 2018-11-02

## 2018-11-02 RX ORDER — DIPHENHYDRAMINE HCL 25 MG
25 TABLET ORAL ONCE
Status: COMPLETED | OUTPATIENT
Start: 2018-11-02 | End: 2018-11-02

## 2018-11-02 RX ORDER — SODIUM CHLORIDE 9 MG/ML
INJECTION, SOLUTION INTRAVENOUS
Status: COMPLETED
Start: 2018-11-02 | End: 2018-11-02

## 2018-11-02 RX ORDER — SODIUM CHLORIDE 9 MG/ML
INJECTION, SOLUTION INTRAVENOUS CONTINUOUS
Status: DISCONTINUED | OUTPATIENT
Start: 2018-11-02 | End: 2018-11-03 | Stop reason: HOSPADM

## 2018-11-02 RX ADMIN — HYDROCORTISONE SODIUM SUCCINATE 100 MG: 100 INJECTION, POWDER, FOR SOLUTION INTRAMUSCULAR; INTRAVENOUS at 08:33

## 2018-11-02 RX ADMIN — INFLIXIMAB 500 MG: 100 INJECTION, POWDER, LYOPHILIZED, FOR SOLUTION INTRAVENOUS at 09:30

## 2018-11-02 RX ADMIN — DIPHENHYDRAMINE HCL 25 MG: 25 TABLET ORAL at 08:33

## 2018-11-02 RX ADMIN — SODIUM CHLORIDE: 9 INJECTION, SOLUTION INTRAVENOUS at 08:34

## 2018-11-02 RX ADMIN — ACETAMINOPHEN 1000 MG: 500 TABLET, FILM COATED ORAL at 08:32

## 2018-11-02 ASSESSMENT — PAIN SCALES - GENERAL
PAINLEVEL_OUTOF10: 0
PAINLEVEL_OUTOF10: 0

## 2018-11-20 ENCOUNTER — HOSPITAL ENCOUNTER (OUTPATIENT)
Dept: WOMENS IMAGING | Age: 68
Discharge: HOME | End: 2018-11-20
Payer: MEDICARE

## 2018-11-20 DIAGNOSIS — Z12.31 VISIT FOR SCREENING MAMMOGRAM: ICD-10-CM

## 2018-11-20 PROCEDURE — 77067 SCR MAMMO BI INCL CAD: CPT

## 2018-11-30 DIAGNOSIS — F39 MOOD DISORDER (HCC): ICD-10-CM

## 2018-11-30 RX ORDER — LOSARTAN POTASSIUM 25 MG/1
25 TABLET ORAL NIGHTLY
Qty: 30 TABLET | Refills: 3 | Status: SHIPPED | OUTPATIENT
Start: 2018-11-30 | End: 2019-04-12 | Stop reason: SDUPTHER

## 2018-11-30 RX ORDER — FLUOXETINE 10 MG/1
10 CAPSULE ORAL DAILY
Qty: 30 CAPSULE | Refills: 3 | Status: SHIPPED | OUTPATIENT
Start: 2018-11-30 | End: 2019-04-12 | Stop reason: SDUPTHER

## 2018-12-03 ENCOUNTER — TELEPHONE (OUTPATIENT)
Dept: INTERNAL MEDICINE CLINIC | Age: 68
End: 2018-12-03

## 2018-12-28 ENCOUNTER — HOSPITAL ENCOUNTER (OUTPATIENT)
Dept: INFUSION THERAPY | Age: 68
End: 2018-12-28

## 2019-01-14 RX ORDER — HYDROCHLOROTHIAZIDE 12.5 MG/1
12.5 CAPSULE, GELATIN COATED ORAL EVERY MORNING
Qty: 30 CAPSULE | Refills: 5 | Status: SHIPPED | OUTPATIENT
Start: 2019-01-14 | End: 2019-07-16 | Stop reason: SDUPTHER

## 2019-02-04 ENCOUNTER — OFFICE VISIT (OUTPATIENT)
Dept: INTERNAL MEDICINE CLINIC | Age: 69
End: 2019-02-04
Payer: MEDICARE

## 2019-02-04 VITALS
DIASTOLIC BLOOD PRESSURE: 68 MMHG | WEIGHT: 217 LBS | RESPIRATION RATE: 16 BRPM | BODY MASS INDEX: 37.84 KG/M2 | OXYGEN SATURATION: 99 % | SYSTOLIC BLOOD PRESSURE: 128 MMHG | HEART RATE: 68 BPM

## 2019-02-04 DIAGNOSIS — I10 ESSENTIAL HYPERTENSION: ICD-10-CM

## 2019-02-04 DIAGNOSIS — E66.01 OBESITY, CLASS III, BMI 40-49.9 (MORBID OBESITY) (HCC): ICD-10-CM

## 2019-02-04 DIAGNOSIS — K51.30 ULCERATIVE RECTOSIGMOIDITIS WITHOUT COMPLICATION (HCC): Primary | ICD-10-CM

## 2019-02-04 DIAGNOSIS — E03.9 HYPOTHYROIDISM, UNSPECIFIED TYPE: ICD-10-CM

## 2019-02-04 DIAGNOSIS — F33.42 MAJOR DEPRESSIVE DISORDER, RECURRENT, IN FULL REMISSION (HCC): ICD-10-CM

## 2019-02-04 PROCEDURE — 3017F COLORECTAL CA SCREEN DOC REV: CPT | Performed by: INTERNAL MEDICINE

## 2019-02-04 PROCEDURE — 4040F PNEUMOC VAC/ADMIN/RCVD: CPT | Performed by: INTERNAL MEDICINE

## 2019-02-04 PROCEDURE — G8399 PT W/DXA RESULTS DOCUMENT: HCPCS | Performed by: INTERNAL MEDICINE

## 2019-02-04 PROCEDURE — G8482 FLU IMMUNIZE ORDER/ADMIN: HCPCS | Performed by: INTERNAL MEDICINE

## 2019-02-04 PROCEDURE — 1090F PRES/ABSN URINE INCON ASSESS: CPT | Performed by: INTERNAL MEDICINE

## 2019-02-04 PROCEDURE — 1036F TOBACCO NON-USER: CPT | Performed by: INTERNAL MEDICINE

## 2019-02-04 PROCEDURE — G8417 CALC BMI ABV UP PARAM F/U: HCPCS | Performed by: INTERNAL MEDICINE

## 2019-02-04 PROCEDURE — 1123F ACP DISCUSS/DSCN MKR DOCD: CPT | Performed by: INTERNAL MEDICINE

## 2019-02-04 PROCEDURE — 1101F PT FALLS ASSESS-DOCD LE1/YR: CPT | Performed by: INTERNAL MEDICINE

## 2019-02-04 PROCEDURE — G8427 DOCREV CUR MEDS BY ELIG CLIN: HCPCS | Performed by: INTERNAL MEDICINE

## 2019-02-04 PROCEDURE — 99214 OFFICE O/P EST MOD 30 MIN: CPT | Performed by: INTERNAL MEDICINE

## 2019-02-04 RX ORDER — ATORVASTATIN CALCIUM 20 MG/1
TABLET, FILM COATED ORAL
Qty: 30 TABLET | Refills: 3 | Status: SHIPPED | OUTPATIENT
Start: 2019-02-04 | End: 2019-06-03 | Stop reason: SDUPTHER

## 2019-02-04 RX ORDER — LEVOTHYROXINE SODIUM 112 UG/1
TABLET ORAL
Qty: 30 TABLET | Refills: 4 | Status: SHIPPED | OUTPATIENT
Start: 2019-02-04 | End: 2019-07-01 | Stop reason: SDUPTHER

## 2019-02-25 RX ORDER — FLUTICASONE PROPIONATE 50 MCG
SPRAY, SUSPENSION (ML) NASAL
Qty: 1 BOTTLE | Refills: 5 | Status: SHIPPED | OUTPATIENT
Start: 2019-02-25 | End: 2020-03-22 | Stop reason: SDUPTHER

## 2019-04-11 DIAGNOSIS — F39 MOOD DISORDER (HCC): ICD-10-CM

## 2019-04-12 RX ORDER — LOSARTAN POTASSIUM 25 MG/1
25 TABLET ORAL NIGHTLY
Qty: 30 TABLET | Refills: 3 | Status: SHIPPED | OUTPATIENT
Start: 2019-04-12 | End: 2019-08-11 | Stop reason: SDUPTHER

## 2019-04-12 RX ORDER — FLUOXETINE 10 MG/1
10 CAPSULE ORAL DAILY
Qty: 30 CAPSULE | Refills: 3 | Status: SHIPPED | OUTPATIENT
Start: 2019-04-12 | End: 2019-08-11 | Stop reason: SDUPTHER

## 2019-06-03 RX ORDER — ATORVASTATIN CALCIUM 20 MG/1
TABLET, FILM COATED ORAL
Qty: 30 TABLET | Refills: 2 | Status: SHIPPED | OUTPATIENT
Start: 2019-06-03 | End: 2019-08-28 | Stop reason: SDUPTHER

## 2019-06-04 ENCOUNTER — OFFICE VISIT (OUTPATIENT)
Dept: INTERNAL MEDICINE CLINIC | Age: 69
End: 2019-06-04
Payer: MEDICARE

## 2019-06-04 VITALS
HEART RATE: 74 BPM | WEIGHT: 222.4 LBS | DIASTOLIC BLOOD PRESSURE: 60 MMHG | OXYGEN SATURATION: 99 % | BODY MASS INDEX: 38.78 KG/M2 | SYSTOLIC BLOOD PRESSURE: 130 MMHG | RESPIRATION RATE: 14 BRPM

## 2019-06-04 DIAGNOSIS — I35.0 MILD AORTIC STENOSIS: ICD-10-CM

## 2019-06-04 DIAGNOSIS — I10 ESSENTIAL HYPERTENSION: ICD-10-CM

## 2019-06-04 DIAGNOSIS — K51.30 ULCERATIVE RECTOSIGMOIDITIS WITHOUT COMPLICATION (HCC): Primary | ICD-10-CM

## 2019-06-04 DIAGNOSIS — E78.2 MIXED HYPERLIPIDEMIA: ICD-10-CM

## 2019-06-04 PROCEDURE — G8427 DOCREV CUR MEDS BY ELIG CLIN: HCPCS | Performed by: INTERNAL MEDICINE

## 2019-06-04 PROCEDURE — 99213 OFFICE O/P EST LOW 20 MIN: CPT | Performed by: INTERNAL MEDICINE

## 2019-06-04 PROCEDURE — 4040F PNEUMOC VAC/ADMIN/RCVD: CPT | Performed by: INTERNAL MEDICINE

## 2019-06-04 PROCEDURE — G8399 PT W/DXA RESULTS DOCUMENT: HCPCS | Performed by: INTERNAL MEDICINE

## 2019-06-04 PROCEDURE — 1090F PRES/ABSN URINE INCON ASSESS: CPT | Performed by: INTERNAL MEDICINE

## 2019-06-04 PROCEDURE — 1036F TOBACCO NON-USER: CPT | Performed by: INTERNAL MEDICINE

## 2019-06-04 PROCEDURE — 1123F ACP DISCUSS/DSCN MKR DOCD: CPT | Performed by: INTERNAL MEDICINE

## 2019-06-04 PROCEDURE — G8417 CALC BMI ABV UP PARAM F/U: HCPCS | Performed by: INTERNAL MEDICINE

## 2019-06-04 PROCEDURE — 3017F COLORECTAL CA SCREEN DOC REV: CPT | Performed by: INTERNAL MEDICINE

## 2019-06-04 NOTE — PROGRESS NOTES
Subjective:      Gab Vail is a 77 y.o. female who presents today for follow up on her chronic medical conditions as noted below. Patient Active Problem List:     Colitis, ulcerative ([address]     Anxiety and depression     Osteopenia     Hypertension     Hypothyroidism     Obesity, Class III, BMI 40-49.9 (morbid obesity) ([facility])     Prediabetes     Mild aortic stenosis     SHE WAS LAST SEEN IN fEB    She has had a recent flare of ELLIE treated by Zee Juan with high dose prednisone    She had stopped remicade in NOv  She has colonoscopy soon    She is having some bloody mucus BM  BM 2-6 ; 2 on good day ; 6 on \"flarre day\"  Some cramping    bp fine  The patient is taking hypertensive medications compliantly without side effects. Denies chest pain, dyspnea, edema, or TIA's. No symptoms of hypo or hyperthyroidism: no decreased or increased weight, no feeling cold/chilly or excessively warm, no diarrhea or constipation, no undue sweatiness, anxiety or palpitations. TSH in Jan was 0.415    Current Outpatient Medications   Medication Sig Dispense Refill    atorvastatin (LIPITOR) 20 MG tablet TAKE 1 TABLET BY MOUTH ONCE DAILY 30 tablet 2    losartan (COZAAR) 25 MG tablet Take 1 tablet by mouth nightly 30 tablet 3    FLUoxetine (PROZAC) 10 MG capsule Take 1 capsule by mouth daily 30 capsule 3    fluticasone (FLONASE) 50 MCG/ACT nasal spray USE 2 SPRAYS IN EACH NOSTRIL DAILY 1 Bottle 5    levothyroxine (SYNTHROID) 112 MCG tablet TAKE ONE (1) TABLET BY MOUTH ONCE DAILY 30 tablet 4    hydrochlorothiazide (MICROZIDE) 12.5 MG capsule Take 1 capsule by mouth every morning 30 capsule 5    albuterol sulfate HFA (PROAIR HFA) 108 (90 BASE) MCG/ACT inhaler Inhale 2 puffs into the lungs every 6 hours as needed for Wheezing 1 Inhaler 3    fluocinonide (LIDEX) 0.05 % cream Apply topically 2 times daily. 30 g 1    minoxidil (MINOXIDIL FOR WOMEN) 2 % external solution Apply  topically 2 times daily.  Apply topically 2 times daily.      mesalamine (LIALDA) 1.2 G EC tablet Take 2,400 mg by mouth daily       lactobacillus rhamnosus, GG, (CULTURELLE) capsule Take 1 capsule by mouth 2 times daily (with meals). 60 capsule 0    calcium-vitamin D (OSCAL-500) 500-200 MG-UNIT per tablet Take 1 tablet by mouth daily. No current facility-administered medications for this visit. Past Medical History:   Diagnosis Date    Anxiety and depression 2010    Asthma     Colitis, ulcerative ([facility] [address] 1999    Jordin Omer    Hyperlipidemia     Hypertension     Hypothyroidism     Mild aortic stenosis 07/2018 7/2018, echo    Obesity, Class III, BMI 40-49.9 (morbid obesity) ([facility] [address] 2018    Osteopenia 2017    Prediabetes     Rib fracture 6/2014    10 th right side from syncope        Social History     Tobacco Use    Smoking status: Never Smoker    Smokeless tobacco: Never Used   Substance Use Topics    Alcohol use: Yes     Comment: every 2-3 months        ROS: The patient has had no headache, sore throat, fever or chills, cough, dyspnea, chest pain, nausea, vomiting or diarrhea, or edema. Objective:      /60   Pulse 74   Resp 14   Wt 222 lb 6.4 oz (100.9 kg)   SpO2 99%   BMI 38.78 kg/m²      Physical Exam   Constitutional: She is oriented to person, place, and time. She appears well-developed and well-nourished. No distress. HENT:   Head: Normocephalic and atraumatic. Mouth/Throat: Oropharynx is clear and moist.   Eyes: Conjunctivae are normal. No scleral icterus. Neck: Neck supple. Cardiovascular: Normal rate and regular rhythm. Murmur heard. Grade 2-3/6 murmur of AS     Pulmonary/Chest: Effort normal. No respiratory distress. She has no wheezes. She has no rales. Abdominal: Soft. She exhibits no distension. There is no tenderness. Musculoskeletal: Normal range of motion. Neurological: She is alert and oriented to person, place, and time. Coordination normal.   Skin: Skin is warm and dry. Psychiatric: She has a normal mood and affect. Her behavior is normal.   Vitals reviewed. Jovon lab rev'd    Last Echo for AS 7/2018 rev'd           Assessment / Plan:      1. Ulcerative rectosigmoiditis without complication ([address]    2. Essential hypertension    3. Mild aortic stenosis    4.  Mixed hyperlipidemia            Plan   Same meds  Colonoscopy olaf Brenner in July  Likely will need to restart Remicade    RTC 4 mo, lab first  Orders Placed This Encounter   Procedures    CBC Auto Differential    Comprehensive Metabolic Panel    Lipid Panel

## 2019-07-01 RX ORDER — LEVOTHYROXINE SODIUM 112 UG/1
TABLET ORAL
Qty: 30 TABLET | Refills: 3 | Status: SHIPPED | OUTPATIENT
Start: 2019-07-01 | End: 2019-10-30 | Stop reason: SDUPTHER

## 2019-07-17 RX ORDER — HYDROCHLOROTHIAZIDE 12.5 MG/1
12.5 CAPSULE, GELATIN COATED ORAL EVERY MORNING
Qty: 30 CAPSULE | Refills: 5 | Status: SHIPPED | OUTPATIENT
Start: 2019-07-17 | End: 2020-01-18 | Stop reason: SDUPTHER

## 2019-07-23 ENCOUNTER — TELEPHONE (OUTPATIENT)
Dept: INTERNAL MEDICINE CLINIC | Age: 69
End: 2019-07-23

## 2019-08-11 DIAGNOSIS — F39 MOOD DISORDER (HCC): ICD-10-CM

## 2019-08-12 RX ORDER — LOSARTAN POTASSIUM 25 MG/1
25 TABLET ORAL NIGHTLY
Qty: 30 TABLET | Refills: 3 | Status: SHIPPED | OUTPATIENT
Start: 2019-08-12 | End: 2019-12-04 | Stop reason: SDUPTHER

## 2019-08-12 RX ORDER — FLUOXETINE 10 MG/1
10 CAPSULE ORAL DAILY
Qty: 30 CAPSULE | Refills: 3 | Status: SHIPPED | OUTPATIENT
Start: 2019-08-12 | End: 2019-12-04 | Stop reason: SDUPTHER

## 2019-08-29 RX ORDER — ATORVASTATIN CALCIUM 20 MG/1
20 TABLET, FILM COATED ORAL DAILY
Qty: 30 TABLET | Refills: 5 | Status: SHIPPED | OUTPATIENT
Start: 2019-08-29 | End: 2020-02-05 | Stop reason: SDUPTHER

## 2019-09-24 ENCOUNTER — OFFICE VISIT (OUTPATIENT)
Dept: INTERNAL MEDICINE CLINIC | Age: 69
End: 2019-09-24
Payer: MEDICARE

## 2019-09-24 VITALS
HEIGHT: 63 IN | HEART RATE: 69 BPM | BODY MASS INDEX: 37.81 KG/M2 | RESPIRATION RATE: 14 BRPM | WEIGHT: 213.4 LBS | OXYGEN SATURATION: 96 % | SYSTOLIC BLOOD PRESSURE: 132 MMHG | DIASTOLIC BLOOD PRESSURE: 74 MMHG

## 2019-09-24 DIAGNOSIS — E66.9 OBESITY (BMI 35.0-39.9 WITHOUT COMORBIDITY): ICD-10-CM

## 2019-09-24 DIAGNOSIS — Z00.00 INITIAL MEDICARE ANNUAL WELLNESS VISIT: Primary | ICD-10-CM

## 2019-09-24 PROCEDURE — 4040F PNEUMOC VAC/ADMIN/RCVD: CPT | Performed by: INTERNAL MEDICINE

## 2019-09-24 PROCEDURE — 1123F ACP DISCUSS/DSCN MKR DOCD: CPT | Performed by: INTERNAL MEDICINE

## 2019-09-24 PROCEDURE — 3017F COLORECTAL CA SCREEN DOC REV: CPT | Performed by: INTERNAL MEDICINE

## 2019-09-24 PROCEDURE — G0438 PPPS, INITIAL VISIT: HCPCS | Performed by: INTERNAL MEDICINE

## 2019-09-24 ASSESSMENT — LIFESTYLE VARIABLES
HOW MANY STANDARD DRINKS CONTAINING ALCOHOL DO YOU HAVE ON A TYPICAL DAY: 0
AUDIT TOTAL SCORE: 1
HOW OFTEN DURING THE LAST YEAR HAVE YOU BEEN UNABLE TO REMEMBER WHAT HAPPENED THE NIGHT BEFORE BECAUSE YOU HAD BEEN DRINKING: 0
AUDIT-C TOTAL SCORE: 1
HOW OFTEN DURING THE LAST YEAR HAVE YOU FAILED TO DO WHAT WAS NORMALLY EXPECTED FROM YOU BECAUSE OF DRINKING: 0
HOW OFTEN DO YOU HAVE SIX OR MORE DRINKS ON ONE OCCASION: 0
HAVE YOU OR SOMEONE ELSE BEEN INJURED AS A RESULT OF YOUR DRINKING: 0
HOW OFTEN DO YOU HAVE A DRINK CONTAINING ALCOHOL: 1
HOW OFTEN DURING THE LAST YEAR HAVE YOU NEEDED AN ALCOHOLIC DRINK FIRST THING IN THE MORNING TO GET YOURSELF GOING AFTER A NIGHT OF HEAVY DRINKING: 0
HAS A RELATIVE, FRIEND, DOCTOR, OR ANOTHER HEALTH PROFESSIONAL EXPRESSED CONCERN ABOUT YOUR DRINKING OR SUGGESTED YOU CUT DOWN: 0
HOW OFTEN DURING THE LAST YEAR HAVE YOU HAD A FEELING OF GUILT OR REMORSE AFTER DRINKING: 0
HOW OFTEN DURING THE LAST YEAR HAVE YOU FOUND THAT YOU WERE NOT ABLE TO STOP DRINKING ONCE YOU HAD STARTED: 0

## 2019-09-24 ASSESSMENT — PATIENT HEALTH QUESTIONNAIRE - PHQ9
SUM OF ALL RESPONSES TO PHQ QUESTIONS 1-9: 0
SUM OF ALL RESPONSES TO PHQ QUESTIONS 1-9: 0

## 2019-09-24 NOTE — PATIENT INSTRUCTIONS
drinks, and soda pop are high in calories. They give you few nutrients and no fiber. Try to limit soda pop, fruit juice, and energy drinks. Drink water instead. Some fats can be part of a healthy diet. But cutting back on fats from highly processed foods like fast foods and many snack foods is a good way to lower the calories in your diet. Also, use smaller amounts of fats like butter, margarine, salad dressing, and mayonnaise. Add fresh garlic, lemon, or herbs to your meals to add flavor without adding fat. Meats and dairy products can be a big source of hidden fats. Try to choose lean or low-fat versions of these products. Fat-free cookies, candies, chips, and frozen treats can still be high in sugar and calories. Some fat-free foods have more calories than regular ones. Eat fat-free treats in moderation, as you would other foods. If your favorite foods are high in fat, salt, sugar, or calories, limit how often you eat them. Eat smaller servings, or look for healthy substitutes. Fill up on fruits, vegetables, and whole grains. Eating at home  · Use meat as a side dish instead of as the main part of your meal.  · Try main dishes that use whole wheat pasta, brown rice, dried beans, or vegetables. · Find ways to cook with little or no fat, such as broiling, steaming, or grilling. · Use cooking spray instead of oil. If you use oil, use a monounsaturated oil, such as canola or olive oil. · Trim fat from meats before you cook them. · Drain off fat after you brown the meat or while you roast it. · Chill soups and stews after you cook them. Then skim the fat off the top after it hardens. Eating out  · Order foods that are broiled or poached rather than fried or breaded. · Cut back on the amount of butter or margarine that you use on bread. · Order sauces, gravies, and salad dressings on the side, and use only a little. · When you order pasta, choose tomato sauce rather than cream sauce.   · Ask for salsa with

## 2019-09-30 LAB
A/G RATIO: 1.6 (CALC) (ref 0.8–2.6)
ALBUMIN SERPL-MCNC: 4.3 GM/DL (ref 3.5–5.2)
ALP BLD-CCNC: 73 U/L (ref 23–144)
ALT SERPL-CCNC: 17 U/L (ref 0–60)
AST SERPL-CCNC: 18 U/L (ref 0–55)
BASOPHILS ABSOLUTE: 0.1 K/MM3 (ref 0–0.3)
BASOPHILS RELATIVE PERCENT: 1.2 % (ref 0–2)
BILIRUB SERPL-MCNC: 0.3 MG/DL (ref 0–1.2)
BUN / CREAT RATIO: 15 (CALC) (ref 7–25)
BUN BLDV-MCNC: 12 MG/DL (ref 3–29)
CALCIUM SERPL-MCNC: 9 MG/DL (ref 8.5–10.5)
CHLORIDE BLD-SCNC: 98 MEQ/L (ref 96–110)
CHOLESTEROL, TOTAL: 126 MG/DL
CO2: 26 MEQ/L (ref 19–32)
CREAT SERPL-MCNC: 0.8 MG/DL
EOSINOPHILS ABSOLUTE: 0.3 K/MM3 (ref 0–0.6)
EOSINOPHILS RELATIVE PERCENT: 5.3 % (ref 0–7)
GFR SERPL CREATININE-BSD FRML MDRD: 74 ML/MIN/1.73M2
GLOBULIN: 2.7 GM/DL (CALC) (ref 1.9–3.6)
GLUCOSE BLD-MCNC: 99 MG/DL
HCT VFR BLD CALC: 36.3 % (ref 35–46)
HDLC SERPL-MCNC: 45 MG/DL
HEMOGLOBIN: 12.4 G/DL (ref 12–15.6)
LDL CHOLESTEROL: 60 MG/DL (CALC)
LEUKOCYTES, UA: 5.5 K/MM3 (ref 3.8–10.8)
LYMPHOCYTES ABSOLUTE: 1.3 K/MM3 (ref 0.9–4.1)
LYMPHOCYTES RELATIVE PERCENT: 23.7 % (ref 14–51)
MCH RBC QN AUTO: 30.6 PG (ref 27–33)
MCHC RBC AUTO-ENTMCNC: 34.3 G/DL (ref 32–36)
MCV RBC AUTO: 89.3 FL (ref 80–100)
MONOCYTES ABSOLUTE: 0.8 K/MM3 (ref 0.2–1.1)
MONOCYTES RELATIVE PERCENT: 14.8 % (ref 0–14)
NEUTROPHILS ABSOLUTE: 3 K/MM3 (ref 1.5–7.8)
PDW BLD-RTO: 13.6 % (ref 9–15)
PLATELET # BLD: 357 K/MM3 (ref 130–400)
POTASSIUM SERPL-SCNC: 4.1 MEQ/L (ref 3.4–5.3)
RBC # BLD: 4.06 M/MM3 (ref 3.9–5.2)
SEGMENTED NEUTROPHILS RELATIVE PERCENT: 55 % (ref 40–76)
SODIUM BLD-SCNC: 141 MEQ/L (ref 135–148)
TOTAL PROTEIN: 7 GM/DL (ref 6–8.3)
TRIGL SERPL-MCNC: 103 MG/DL
VLDLC SERPL CALC-MCNC: 21 MG/DL (CALC) (ref 4–38)

## 2019-10-30 RX ORDER — LEVOTHYROXINE SODIUM 112 UG/1
TABLET ORAL
Qty: 30 TABLET | Refills: 2 | Status: SHIPPED | OUTPATIENT
Start: 2019-10-30 | End: 2020-01-25 | Stop reason: SDUPTHER

## 2019-12-04 DIAGNOSIS — F39 MOOD DISORDER (HCC): ICD-10-CM

## 2019-12-05 RX ORDER — FLUOXETINE 10 MG/1
10 CAPSULE ORAL DAILY
Qty: 30 CAPSULE | Refills: 3 | Status: SHIPPED | OUTPATIENT
Start: 2019-12-05 | End: 2020-04-15 | Stop reason: SDUPTHER

## 2019-12-05 RX ORDER — LOSARTAN POTASSIUM 25 MG/1
25 TABLET ORAL NIGHTLY
Qty: 30 TABLET | Refills: 3 | Status: SHIPPED | OUTPATIENT
Start: 2019-12-05 | End: 2020-03-22 | Stop reason: SDUPTHER

## 2019-12-10 ENCOUNTER — OFFICE VISIT (OUTPATIENT)
Dept: INTERNAL MEDICINE CLINIC | Age: 69
End: 2019-12-10
Payer: MEDICARE

## 2019-12-10 VITALS
HEIGHT: 64 IN | OXYGEN SATURATION: 98 % | HEART RATE: 73 BPM | DIASTOLIC BLOOD PRESSURE: 74 MMHG | WEIGHT: 203 LBS | SYSTOLIC BLOOD PRESSURE: 140 MMHG | BODY MASS INDEX: 34.66 KG/M2

## 2019-12-10 DIAGNOSIS — I10 ESSENTIAL HYPERTENSION: Primary | ICD-10-CM

## 2019-12-10 DIAGNOSIS — K51.30 ULCERATIVE RECTOSIGMOIDITIS WITHOUT COMPLICATION (HCC): ICD-10-CM

## 2019-12-10 DIAGNOSIS — E03.9 HYPOTHYROIDISM, UNSPECIFIED TYPE: ICD-10-CM

## 2019-12-10 DIAGNOSIS — E78.2 MIXED HYPERLIPIDEMIA: ICD-10-CM

## 2019-12-10 PROCEDURE — G8427 DOCREV CUR MEDS BY ELIG CLIN: HCPCS | Performed by: INTERNAL MEDICINE

## 2019-12-10 PROCEDURE — G8482 FLU IMMUNIZE ORDER/ADMIN: HCPCS | Performed by: INTERNAL MEDICINE

## 2019-12-10 PROCEDURE — 99213 OFFICE O/P EST LOW 20 MIN: CPT | Performed by: INTERNAL MEDICINE

## 2019-12-10 PROCEDURE — G8399 PT W/DXA RESULTS DOCUMENT: HCPCS | Performed by: INTERNAL MEDICINE

## 2019-12-10 PROCEDURE — G8417 CALC BMI ABV UP PARAM F/U: HCPCS | Performed by: INTERNAL MEDICINE

## 2019-12-10 PROCEDURE — 1036F TOBACCO NON-USER: CPT | Performed by: INTERNAL MEDICINE

## 2019-12-10 PROCEDURE — 1090F PRES/ABSN URINE INCON ASSESS: CPT | Performed by: INTERNAL MEDICINE

## 2019-12-10 PROCEDURE — 1123F ACP DISCUSS/DSCN MKR DOCD: CPT | Performed by: INTERNAL MEDICINE

## 2019-12-10 PROCEDURE — 3017F COLORECTAL CA SCREEN DOC REV: CPT | Performed by: INTERNAL MEDICINE

## 2019-12-10 PROCEDURE — 4040F PNEUMOC VAC/ADMIN/RCVD: CPT | Performed by: INTERNAL MEDICINE

## 2020-01-18 RX ORDER — HYDROCHLOROTHIAZIDE 12.5 MG/1
12.5 CAPSULE, GELATIN COATED ORAL EVERY MORNING
Qty: 30 CAPSULE | Refills: 5 | Status: SHIPPED | OUTPATIENT
Start: 2020-01-18 | End: 2020-07-11 | Stop reason: SDUPTHER

## 2020-01-27 RX ORDER — LEVOTHYROXINE SODIUM 112 UG/1
112 TABLET ORAL DAILY
Qty: 30 TABLET | Refills: 5 | Status: SHIPPED | OUTPATIENT
Start: 2020-01-27 | End: 2020-07-11 | Stop reason: SDUPTHER

## 2020-02-05 RX ORDER — ATORVASTATIN CALCIUM 20 MG/1
20 TABLET, FILM COATED ORAL DAILY
Qty: 30 TABLET | Refills: 5 | Status: SHIPPED | OUTPATIENT
Start: 2020-02-05 | End: 2020-02-23 | Stop reason: SDUPTHER

## 2020-02-24 RX ORDER — ATORVASTATIN CALCIUM 20 MG/1
20 TABLET, FILM COATED ORAL DAILY
Qty: 30 TABLET | Refills: 5 | Status: SHIPPED | OUTPATIENT
Start: 2020-02-24 | End: 2020-08-23 | Stop reason: SDUPTHER

## 2020-03-10 ENCOUNTER — HOSPITAL ENCOUNTER (OUTPATIENT)
Age: 70
Discharge: HOME | End: 2020-03-10
Payer: MEDICARE

## 2020-03-10 ENCOUNTER — HOSPITAL ENCOUNTER (OUTPATIENT)
Dept: GENERAL RADIOLOGY | Age: 70
Discharge: HOME | End: 2020-03-10
Payer: MEDICARE

## 2020-03-10 PROCEDURE — 71046 X-RAY EXAM CHEST 2 VIEWS: CPT

## 2020-03-12 ENCOUNTER — OFFICE VISIT (OUTPATIENT)
Dept: INTERNAL MEDICINE CLINIC | Age: 70
End: 2020-03-12
Payer: MEDICARE

## 2020-03-12 VITALS
DIASTOLIC BLOOD PRESSURE: 58 MMHG | RESPIRATION RATE: 16 BRPM | WEIGHT: 201 LBS | HEART RATE: 72 BPM | BODY MASS INDEX: 35.05 KG/M2 | SYSTOLIC BLOOD PRESSURE: 120 MMHG

## 2020-03-12 PROCEDURE — 4040F PNEUMOC VAC/ADMIN/RCVD: CPT | Performed by: FAMILY MEDICINE

## 2020-03-12 PROCEDURE — G8427 DOCREV CUR MEDS BY ELIG CLIN: HCPCS | Performed by: FAMILY MEDICINE

## 2020-03-12 PROCEDURE — 3017F COLORECTAL CA SCREEN DOC REV: CPT | Performed by: FAMILY MEDICINE

## 2020-03-12 PROCEDURE — G8482 FLU IMMUNIZE ORDER/ADMIN: HCPCS | Performed by: FAMILY MEDICINE

## 2020-03-12 PROCEDURE — 1036F TOBACCO NON-USER: CPT | Performed by: FAMILY MEDICINE

## 2020-03-12 PROCEDURE — G8399 PT W/DXA RESULTS DOCUMENT: HCPCS | Performed by: FAMILY MEDICINE

## 2020-03-12 PROCEDURE — 1090F PRES/ABSN URINE INCON ASSESS: CPT | Performed by: FAMILY MEDICINE

## 2020-03-12 PROCEDURE — 1123F ACP DISCUSS/DSCN MKR DOCD: CPT | Performed by: FAMILY MEDICINE

## 2020-03-12 PROCEDURE — G8417 CALC BMI ABV UP PARAM F/U: HCPCS | Performed by: FAMILY MEDICINE

## 2020-03-12 PROCEDURE — 99213 OFFICE O/P EST LOW 20 MIN: CPT | Performed by: FAMILY MEDICINE

## 2020-03-12 ASSESSMENT — ENCOUNTER SYMPTOMS
SORE THROAT: 0
CONSTIPATION: 0
VOMITING: 0
SHORTNESS OF BREATH: 0
COLOR CHANGE: 0
DIARRHEA: 0
CHEST TIGHTNESS: 0
ABDOMINAL PAIN: 0

## 2020-03-12 NOTE — PROGRESS NOTES
Genitourinary: Negative for dysuria. Skin: Negative for color change. Neurological: Negative for dizziness and light-headedness. Psychiatric/Behavioral: Negative for dysphoric mood. The patient is not nervous/anxious. Prior to Visit Medications    Medication Sig Taking? Authorizing Provider   atorvastatin (LIPITOR) 20 MG tablet Take 1 tablet by mouth daily Yes Kylie Kapoor MD   fluocinonide (LIDEX) 0.05 % cream Apply topically 2 times daily. Yes Briana Guillen MD   levothyroxine (SYNTHROID) 112 MCG tablet Take 1 tablet by mouth Daily Yes Briana Guillen MD   hydrochlorothiazide (MICROZIDE) 12.5 MG capsule Take 1 capsule by mouth every morning Yes Briana Guillen MD   Mesalamine (LIALDA PO) Take 800 mg by mouth Taking 3 tablets daily Yes Historical MD Leonardo   losartan (COZAAR) 25 MG tablet Take 1 tablet by mouth nightly Yes Briana Guillen MD   FLUoxetine (PROZAC) 10 MG capsule Take 1 capsule by mouth daily Yes Briana Guillen MD   fluticasone (FLONASE) 50 MCG/ACT nasal spray USE 2 SPRAYS IN EACH NOSTRIL DAILY Yes Briana Guillen MD   albuterol sulfate HFA (PROAIR HFA) 108 (90 BASE) MCG/ACT inhaler Inhale 2 puffs into the lungs every 6 hours as needed for Wheezing Yes Briana Guillen MD   minoxidil (MINOXIDIL FOR WOMEN) 2 % external solution Apply  topically 2 times daily. Apply topically 2 times daily. Yes Historical Provider, MD   lactobacillus rhamnosus, GG, (CULTURELLE) capsule Take 1 capsule by mouth 2 times daily (with meals). Yes Mingo Nails MD   calcium-vitamin D (OSCAL-500) 500-200 MG-UNIT per tablet Take 1 tablet by mouth daily. Yes Historical Provider, MD          Objective:      BP (!) 120/58 (Site: Right Upper Arm, Position: Sitting, Cuff Size: Large Adult)   Pulse 72   Resp 16   Wt 201 lb (91.2 kg)   BMI 35.05 kg/m²      Physical Exam  Vitals signs and nursing note reviewed. Constitutional:       General: She is not in acute distress.      Appearance: Normal appearance. She is not ill-appearing or toxic-appearing. HENT:      Head: Normocephalic and atraumatic. Right Ear: External ear normal.      Left Ear: External ear normal.      Nose: Nose normal.      Mouth/Throat:      Pharynx: Oropharynx is clear. Eyes:      General: No scleral icterus. Right eye: No discharge. Left eye: No discharge. Extraocular Movements: Extraocular movements intact. Conjunctiva/sclera: Conjunctivae normal.   Neck:      Musculoskeletal: Normal range of motion and neck supple. No neck rigidity. Cardiovascular:      Rate and Rhythm: Normal rate and regular rhythm. Heart sounds: Normal heart sounds. Pulmonary:      Effort: Pulmonary effort is normal.      Breath sounds: Normal breath sounds. No wheezing or rales. Musculoskeletal:         General: No deformity. Skin:     General: Skin is warm and dry. Findings: No rash. Neurological:      General: No focal deficit present. Mental Status: She is alert. Mental status is at baseline. Motor: No weakness. Psychiatric:         Mood and Affect: Mood normal.         Behavior: Behavior normal.            Assessment / Plan:      1. Essential hypertension  Stable, well controlled. Continue current meds. 2. General medical exam  - Hemoglobin A1C; Future  - Lipid Panel; Future  - CBC Auto Differential; Future  - Comprehensive Metabolic Panel; Future    3. Hypothyroidism, unspecified type  Last TSH wnl, patient asymptomatic. Will recheck labs in 6 months. Continue current dose of synthroid. - T4, Free; Future  - TSH without Reflex; Future    4. Ulcerative rectosigmoiditis without complication ([facility] [address]  Being managed by GI. Currently on mesalamine and prednisone, planning to restart remicade once approved. Patient voiced understanding and agreement with plan. All questions/concerns were addressed, risks/side effects of medications were reviewed.   Return precautions and after visit

## 2020-03-23 RX ORDER — FLUTICASONE PROPIONATE 50 MCG
2 SPRAY, SUSPENSION (ML) NASAL DAILY
Qty: 1 BOTTLE | Refills: 5 | Status: SHIPPED | OUTPATIENT
Start: 2020-03-23 | End: 2021-07-06 | Stop reason: SDUPTHER

## 2020-03-23 RX ORDER — LOSARTAN POTASSIUM 25 MG/1
25 TABLET ORAL NIGHTLY
Qty: 30 TABLET | Refills: 3 | Status: SHIPPED | OUTPATIENT
Start: 2020-03-23 | End: 2020-07-25 | Stop reason: SDUPTHER

## 2020-04-13 ENCOUNTER — HOSPITAL ENCOUNTER (OUTPATIENT)
Dept: INFUSION THERAPY | Age: 70
Setting detail: INFUSION SERIES
Discharge: HOME | End: 2020-04-13
Payer: MEDICARE

## 2020-04-13 VITALS
RESPIRATION RATE: 16 BRPM | WEIGHT: 200 LBS | TEMPERATURE: 97.3 F | SYSTOLIC BLOOD PRESSURE: 136 MMHG | HEIGHT: 63 IN | OXYGEN SATURATION: 96 % | HEART RATE: 76 BPM | BODY MASS INDEX: 35.44 KG/M2 | DIASTOLIC BLOOD PRESSURE: 60 MMHG

## 2020-04-13 PROCEDURE — 6360000002 HC RX W HCPCS: Performed by: INTERNAL MEDICINE

## 2020-04-13 PROCEDURE — 99211 OFF/OP EST MAY X REQ PHY/QHP: CPT

## 2020-04-13 PROCEDURE — 2580000003 HC RX 258: Performed by: INTERNAL MEDICINE

## 2020-04-13 PROCEDURE — 96365 THER/PROPH/DIAG IV INF INIT: CPT

## 2020-04-13 PROCEDURE — 96413 CHEMO IV INFUSION 1 HR: CPT

## 2020-04-13 PROCEDURE — 96415 CHEMO IV INFUSION ADDL HR: CPT

## 2020-04-13 PROCEDURE — 96366 THER/PROPH/DIAG IV INF ADDON: CPT

## 2020-04-13 RX ORDER — PREDNISONE 10 MG/1
10 TABLET ORAL DAILY
COMMUNITY
End: 2020-09-17

## 2020-04-13 RX ORDER — SODIUM CHLORIDE 0.9 % (FLUSH) 0.9 %
10 SYRINGE (ML) INJECTION PRN
Status: DISCONTINUED | OUTPATIENT
Start: 2020-04-13 | End: 2020-04-14 | Stop reason: HOSPADM

## 2020-04-13 RX ADMIN — SODIUM CHLORIDE, PRESERVATIVE FREE 10 ML: 5 INJECTION INTRAVENOUS at 09:26

## 2020-04-13 RX ADMIN — SODIUM CHLORIDE, PRESERVATIVE FREE 10 ML: 5 INJECTION INTRAVENOUS at 11:48

## 2020-04-13 RX ADMIN — INFLIXIMAB 450 MG: 100 INJECTION, POWDER, LYOPHILIZED, FOR SOLUTION INTRAVENOUS at 09:38

## 2020-04-13 NOTE — PROGRESS NOTES
Tolerated Remicade well. Reviewed discharge instruction, voiced understanding. Copies of AVS given. Pt discharged home. Pt to exit via ambulation.     Orders Placed This Encounter   Medications    inFLIXimab (REMICADE) 450 mg in sodium chloride 0.9 % 250 mL IVPB    sodium chloride flush 0.9 % injection 10 mL

## 2020-04-16 RX ORDER — FLUOXETINE 10 MG/1
10 CAPSULE ORAL DAILY
Qty: 30 CAPSULE | Refills: 3 | Status: SHIPPED | OUTPATIENT
Start: 2020-04-16 | End: 2020-08-15 | Stop reason: SDUPTHER

## 2020-04-27 ENCOUNTER — HOSPITAL ENCOUNTER (OUTPATIENT)
Dept: INFUSION THERAPY | Age: 70
Setting detail: INFUSION SERIES
Discharge: HOME | End: 2020-04-27
Payer: MEDICARE

## 2020-04-27 VITALS
WEIGHT: 199 LBS | OXYGEN SATURATION: 98 % | DIASTOLIC BLOOD PRESSURE: 45 MMHG | TEMPERATURE: 97.2 F | HEART RATE: 72 BPM | SYSTOLIC BLOOD PRESSURE: 119 MMHG | BODY MASS INDEX: 35.26 KG/M2 | HEIGHT: 63 IN | RESPIRATION RATE: 14 BRPM

## 2020-04-27 PROCEDURE — 96413 CHEMO IV INFUSION 1 HR: CPT

## 2020-04-27 PROCEDURE — 2580000003 HC RX 258: Performed by: INTERNAL MEDICINE

## 2020-04-27 PROCEDURE — 96365 THER/PROPH/DIAG IV INF INIT: CPT

## 2020-04-27 PROCEDURE — 96366 THER/PROPH/DIAG IV INF ADDON: CPT

## 2020-04-27 PROCEDURE — 6360000002 HC RX W HCPCS: Performed by: INTERNAL MEDICINE

## 2020-04-27 PROCEDURE — 96415 CHEMO IV INFUSION ADDL HR: CPT

## 2020-04-27 PROCEDURE — 99211 OFF/OP EST MAY X REQ PHY/QHP: CPT

## 2020-04-27 RX ORDER — ACETAMINOPHEN 325 MG/1
650 TABLET ORAL ONCE
Status: DISCONTINUED | OUTPATIENT
Start: 2020-04-27 | End: 2020-04-28 | Stop reason: HOSPADM

## 2020-04-27 RX ADMIN — INFLIXIMAB 450 MG: 100 INJECTION, POWDER, LYOPHILIZED, FOR SOLUTION INTRAVENOUS at 09:52

## 2020-04-27 NOTE — PROGRESS NOTES
Tolerated infusion well. Reviewed discharge instruction, voiced understanding. Copies of AVS given. Pt discharged home. Pt to exit via steady gait.     Orders Placed This Encounter   Medications    inFLIXimab (REMICADE) 450 mg in sodium chloride 0.9 % 250 mL IVPB    acetaminophen (TYLENOL) tablet 650 mg

## 2020-05-26 ENCOUNTER — HOSPITAL ENCOUNTER (OUTPATIENT)
Dept: INFUSION THERAPY | Age: 70
Setting detail: INFUSION SERIES
Discharge: HOME | End: 2020-05-26
Payer: MEDICARE

## 2020-05-26 VITALS
DIASTOLIC BLOOD PRESSURE: 41 MMHG | SYSTOLIC BLOOD PRESSURE: 116 MMHG | RESPIRATION RATE: 16 BRPM | TEMPERATURE: 97.7 F | WEIGHT: 206 LBS | HEART RATE: 67 BPM | BODY MASS INDEX: 36.49 KG/M2 | OXYGEN SATURATION: 97 %

## 2020-05-26 PROCEDURE — 96366 THER/PROPH/DIAG IV INF ADDON: CPT

## 2020-05-26 PROCEDURE — 96415 CHEMO IV INFUSION ADDL HR: CPT

## 2020-05-26 PROCEDURE — 96413 CHEMO IV INFUSION 1 HR: CPT

## 2020-05-26 PROCEDURE — 2580000003 HC RX 258: Performed by: INTERNAL MEDICINE

## 2020-05-26 PROCEDURE — 99211 OFF/OP EST MAY X REQ PHY/QHP: CPT

## 2020-05-26 PROCEDURE — 6360000002 HC RX W HCPCS: Performed by: INTERNAL MEDICINE

## 2020-05-26 PROCEDURE — 96365 THER/PROPH/DIAG IV INF INIT: CPT

## 2020-05-26 RX ORDER — ACETAMINOPHEN 325 MG/1
650 TABLET ORAL ONCE
Status: DISCONTINUED | OUTPATIENT
Start: 2020-05-26 | End: 2020-05-27 | Stop reason: HOSPADM

## 2020-05-26 RX ADMIN — INFLIXIMAB 470 MG: 100 INJECTION, POWDER, LYOPHILIZED, FOR SOLUTION INTRAVENOUS at 09:16

## 2020-05-26 NOTE — PROGRESS NOTES
Tolerated infusion well. Reviewed discharge instruction, voiced understanding. Copies of AVS given. Pt discharged home. Pt to exit via steady gait.     Orders Placed This Encounter   Medications    inFLIXimab (REMICADE) 470 mg in sodium chloride 0.9 % 250 mL IVPB    acetaminophen (TYLENOL) tablet 650 mg

## 2020-07-20 RX ORDER — HYDROCHLOROTHIAZIDE 12.5 MG/1
12.5 CAPSULE, GELATIN COATED ORAL EVERY MORNING
Qty: 30 CAPSULE | Refills: 5 | Status: SHIPPED | OUTPATIENT
Start: 2020-07-20 | End: 2020-12-17 | Stop reason: SDUPTHER

## 2020-07-21 ENCOUNTER — HOSPITAL ENCOUNTER (OUTPATIENT)
Dept: INFUSION THERAPY | Age: 70
Setting detail: INFUSION SERIES
Discharge: HOME | End: 2020-07-21
Payer: MEDICARE

## 2020-07-21 VITALS
HEIGHT: 63 IN | BODY MASS INDEX: 36.5 KG/M2 | DIASTOLIC BLOOD PRESSURE: 57 MMHG | TEMPERATURE: 97.3 F | WEIGHT: 206 LBS | OXYGEN SATURATION: 97 % | HEART RATE: 84 BPM | RESPIRATION RATE: 16 BRPM | SYSTOLIC BLOOD PRESSURE: 139 MMHG

## 2020-07-21 PROCEDURE — 6360000002 HC RX W HCPCS: Performed by: INTERNAL MEDICINE

## 2020-07-21 PROCEDURE — 2580000003 HC RX 258: Performed by: INTERNAL MEDICINE

## 2020-07-21 PROCEDURE — 96413 CHEMO IV INFUSION 1 HR: CPT

## 2020-07-21 PROCEDURE — 99211 OFF/OP EST MAY X REQ PHY/QHP: CPT

## 2020-07-21 PROCEDURE — 96366 THER/PROPH/DIAG IV INF ADDON: CPT

## 2020-07-21 PROCEDURE — 96365 THER/PROPH/DIAG IV INF INIT: CPT

## 2020-07-21 PROCEDURE — 96415 CHEMO IV INFUSION ADDL HR: CPT

## 2020-07-21 RX ORDER — SODIUM CHLORIDE 0.9 % (FLUSH) 0.9 %
10 SYRINGE (ML) INJECTION PRN
Status: DISCONTINUED | OUTPATIENT
Start: 2020-07-21 | End: 2020-07-22 | Stop reason: HOSPADM

## 2020-07-21 RX ADMIN — INFLIXIMAB 470 MG: 100 INJECTION, POWDER, LYOPHILIZED, FOR SOLUTION INTRAVENOUS at 09:19

## 2020-07-27 RX ORDER — EPINEPHRINE 1 MG/ML
0.3 INJECTION, SOLUTION, CONCENTRATE INTRAVENOUS PRN
Status: CANCELLED | OUTPATIENT
Start: 2020-09-15

## 2020-07-27 RX ORDER — SODIUM CHLORIDE 0.9 % (FLUSH) 0.9 %
5 SYRINGE (ML) INJECTION PRN
Status: CANCELLED | OUTPATIENT
Start: 2020-09-15

## 2020-07-27 RX ORDER — METHYLPREDNISOLONE SODIUM SUCCINATE 125 MG/2ML
125 INJECTION, POWDER, LYOPHILIZED, FOR SOLUTION INTRAMUSCULAR; INTRAVENOUS ONCE
Status: CANCELLED | OUTPATIENT
Start: 2020-09-15

## 2020-07-27 RX ORDER — DIPHENHYDRAMINE HYDROCHLORIDE 50 MG/ML
50 INJECTION INTRAMUSCULAR; INTRAVENOUS ONCE
Status: CANCELLED | OUTPATIENT
Start: 2020-09-15

## 2020-07-27 RX ORDER — SODIUM CHLORIDE 9 MG/ML
INJECTION, SOLUTION INTRAVENOUS CONTINUOUS
Status: CANCELLED | OUTPATIENT
Start: 2020-09-15

## 2020-07-27 RX ORDER — HEPARIN SODIUM (PORCINE) LOCK FLUSH IV SOLN 100 UNIT/ML 100 UNIT/ML
500 SOLUTION INTRAVENOUS PRN
Status: CANCELLED | OUTPATIENT
Start: 2020-09-15

## 2020-08-17 RX ORDER — FLUOXETINE 10 MG/1
10 CAPSULE ORAL DAILY
Qty: 30 CAPSULE | Refills: 3 | Status: SHIPPED | OUTPATIENT
Start: 2020-08-17 | End: 2020-09-26 | Stop reason: SDUPTHER

## 2020-08-24 RX ORDER — ATORVASTATIN CALCIUM 20 MG/1
20 TABLET, FILM COATED ORAL DAILY
Qty: 30 TABLET | Refills: 5 | Status: SHIPPED | OUTPATIENT
Start: 2020-08-24 | End: 2021-02-13 | Stop reason: SDUPTHER

## 2020-09-10 LAB
ALBUMIN SERPL-MCNC: 4.5 G/DL
ALP BLD-CCNC: 58 U/L
ALT SERPL-CCNC: 23 U/L
ANION GAP SERPL CALCULATED.3IONS-SCNC: 2 MMOL/L
AST SERPL-CCNC: 23 U/L
AVERAGE GLUCOSE: 97
BASOPHILS ABSOLUTE: 0 /ΜL
BASOPHILS RELATIVE PERCENT: 1.2 %
BILIRUB SERPL-MCNC: 0.6 MG/DL (ref 0.1–1.4)
BUN BLDV-MCNC: 12 MG/DL
CALCIUM SERPL-MCNC: 9.3 MG/DL
CHLORIDE BLD-SCNC: 98 MMOL/L
CHOLESTEROL, TOTAL: 151 MG/DL
CHOLESTEROL/HDL RATIO: ABNORMAL
CO2: 27 MMOL/L
CREAT SERPL-MCNC: 0.8 MG/DL
EOSINOPHILS ABSOLUTE: 0.1 /ΜL
EOSINOPHILS RELATIVE PERCENT: 3.1 %
GFR CALCULATED: 74
GLUCOSE BLD-MCNC: 97 MG/DL
HBA1C MFR BLD: 6 %
HCT VFR BLD CALC: 40.7 % (ref 36–46)
HDLC SERPL-MCNC: 67 MG/DL (ref 35–70)
HEMOGLOBIN: 13.7 G/DL (ref 12–16)
LDL CHOLESTEROL CALCULATED: 53 MG/DL (ref 0–160)
LYMPHOCYTES ABSOLUTE: 1.3 /ΜL
LYMPHOCYTES RELATIVE PERCENT: 33.6 %
MCH RBC QN AUTO: 30.4 PG
MCHC RBC AUTO-ENTMCNC: 33.8 G/DL
MCV RBC AUTO: 90.1 FL
MONOCYTES ABSOLUTE: 0.5 /ΜL
MONOCYTES RELATIVE PERCENT: 14 %
NEUTROPHILS ABSOLUTE: 1.9 /ΜL
NEUTROPHILS RELATIVE PERCENT: 48.1 %
NONHDLC SERPL-MCNC: ABNORMAL MG/DL
PDW BLD-RTO: 14.7 %
PLATELET # BLD: 282 K/ΜL
PMV BLD AUTO: NORMAL FL
POTASSIUM SERPL-SCNC: 4.2 MMOL/L
RBC # BLD: 4.52 10^6/ΜL
SODIUM BLD-SCNC: 139 MMOL/L
T4 FREE: 1.43
TOTAL PROTEIN: 6.8
TRIGL SERPL-MCNC: 153 MG/DL
VLDLC SERPL CALC-MCNC: 31 MG/DL
WBC # BLD: 3.9 10^3/ML

## 2020-09-14 PROBLEM — E66.01 OBESITY, CLASS III, BMI 40-49.9 (MORBID OBESITY) (HCC): Status: RESOLVED | Noted: 2018-01-01 | Resolved: 2020-09-14

## 2020-09-14 PROBLEM — E66.813 OBESITY, CLASS III, BMI 40-49.9 (MORBID OBESITY) (HCC): Status: RESOLVED | Noted: 2018-01-01 | Resolved: 2020-09-14

## 2020-09-15 ENCOUNTER — HOSPITAL ENCOUNTER (OUTPATIENT)
Dept: INFUSION THERAPY | Age: 70
Setting detail: INFUSION SERIES
Discharge: HOME | End: 2020-09-15
Payer: MEDICARE

## 2020-09-15 VITALS
DIASTOLIC BLOOD PRESSURE: 73 MMHG | BODY MASS INDEX: 36.86 KG/M2 | OXYGEN SATURATION: 96 % | RESPIRATION RATE: 16 BRPM | HEIGHT: 63 IN | TEMPERATURE: 97.7 F | WEIGHT: 208 LBS | HEART RATE: 72 BPM | SYSTOLIC BLOOD PRESSURE: 124 MMHG

## 2020-09-15 DIAGNOSIS — K51.30 ULCERATIVE RECTOSIGMOIDITIS WITHOUT COMPLICATION (HCC): Primary | ICD-10-CM

## 2020-09-15 PROCEDURE — 96413 CHEMO IV INFUSION 1 HR: CPT

## 2020-09-15 PROCEDURE — 96415 CHEMO IV INFUSION ADDL HR: CPT

## 2020-09-15 PROCEDURE — 6360000002 HC RX W HCPCS: Performed by: INTERNAL MEDICINE

## 2020-09-15 PROCEDURE — 2580000003 HC RX 258: Performed by: INTERNAL MEDICINE

## 2020-09-15 PROCEDURE — 99211 OFF/OP EST MAY X REQ PHY/QHP: CPT

## 2020-09-15 PROCEDURE — 96366 THER/PROPH/DIAG IV INF ADDON: CPT

## 2020-09-15 PROCEDURE — 96365 THER/PROPH/DIAG IV INF INIT: CPT

## 2020-09-15 RX ORDER — SODIUM CHLORIDE 0.9 % (FLUSH) 0.9 %
5 SYRINGE (ML) INJECTION PRN
Status: CANCELLED | OUTPATIENT
Start: 2020-11-10

## 2020-09-15 RX ORDER — DIPHENHYDRAMINE HYDROCHLORIDE 50 MG/ML
50 INJECTION INTRAMUSCULAR; INTRAVENOUS ONCE
Status: CANCELLED | OUTPATIENT
Start: 2020-11-10

## 2020-09-15 RX ORDER — SODIUM CHLORIDE 9 MG/ML
INJECTION, SOLUTION INTRAVENOUS CONTINUOUS
Status: CANCELLED | OUTPATIENT
Start: 2020-11-10

## 2020-09-15 RX ORDER — ACETAMINOPHEN 325 MG/1
650 TABLET ORAL ONCE
Status: DISCONTINUED | OUTPATIENT
Start: 2020-09-15 | End: 2020-09-16 | Stop reason: HOSPADM

## 2020-09-15 RX ORDER — SODIUM CHLORIDE 0.9 % (FLUSH) 0.9 %
10 SYRINGE (ML) INJECTION PRN
Status: DISCONTINUED | OUTPATIENT
Start: 2020-09-15 | End: 2020-09-16 | Stop reason: HOSPADM

## 2020-09-15 RX ORDER — ACETAMINOPHEN 325 MG/1
650 TABLET ORAL ONCE
Status: CANCELLED | OUTPATIENT
Start: 2020-11-10

## 2020-09-15 RX ORDER — METHYLPREDNISOLONE SODIUM SUCCINATE 125 MG/2ML
125 INJECTION, POWDER, LYOPHILIZED, FOR SOLUTION INTRAMUSCULAR; INTRAVENOUS ONCE
Status: CANCELLED | OUTPATIENT
Start: 2020-11-10

## 2020-09-15 RX ORDER — SODIUM CHLORIDE 0.9 % (FLUSH) 0.9 %
10 SYRINGE (ML) INJECTION PRN
Status: CANCELLED | OUTPATIENT
Start: 2020-11-10

## 2020-09-15 RX ORDER — HEPARIN SODIUM (PORCINE) LOCK FLUSH IV SOLN 100 UNIT/ML 100 UNIT/ML
500 SOLUTION INTRAVENOUS PRN
Status: CANCELLED | OUTPATIENT
Start: 2020-11-10

## 2020-09-15 RX ORDER — EPINEPHRINE 1 MG/ML
0.3 INJECTION, SOLUTION, CONCENTRATE INTRAVENOUS PRN
Status: CANCELLED | OUTPATIENT
Start: 2020-11-10

## 2020-09-15 RX ADMIN — SODIUM CHLORIDE, PRESERVATIVE FREE 10 ML: 5 INJECTION INTRAVENOUS at 09:20

## 2020-09-15 RX ADMIN — INFLIXIMAB 470 MG: 100 INJECTION, POWDER, LYOPHILIZED, FOR SOLUTION INTRAVENOUS at 09:25

## 2020-09-17 ENCOUNTER — OFFICE VISIT (OUTPATIENT)
Dept: INTERNAL MEDICINE CLINIC | Age: 70
End: 2020-09-17
Payer: MEDICARE

## 2020-09-17 VITALS
HEART RATE: 74 BPM | OXYGEN SATURATION: 98 % | HEIGHT: 62 IN | BODY MASS INDEX: 39.31 KG/M2 | TEMPERATURE: 97.9 F | DIASTOLIC BLOOD PRESSURE: 70 MMHG | SYSTOLIC BLOOD PRESSURE: 138 MMHG | WEIGHT: 213.6 LBS

## 2020-09-17 PROBLEM — J45.41 MODERATE PERSISTENT ASTHMA WITH EXACERBATION: Status: ACTIVE | Noted: 2020-09-17

## 2020-09-17 PROBLEM — E78.49 OTHER HYPERLIPIDEMIA: Status: ACTIVE | Noted: 2020-09-17

## 2020-09-17 PROBLEM — F33.42 MAJOR DEPRESSIVE DISORDER, RECURRENT, IN FULL REMISSION (HCC): Status: ACTIVE | Noted: 2020-09-17

## 2020-09-17 PROBLEM — D84.9 IMMUNOCOMPROMISED (HCC): Status: ACTIVE | Noted: 2020-09-17

## 2020-09-17 PROCEDURE — G8399 PT W/DXA RESULTS DOCUMENT: HCPCS | Performed by: FAMILY MEDICINE

## 2020-09-17 PROCEDURE — 1036F TOBACCO NON-USER: CPT | Performed by: FAMILY MEDICINE

## 2020-09-17 PROCEDURE — 99214 OFFICE O/P EST MOD 30 MIN: CPT | Performed by: FAMILY MEDICINE

## 2020-09-17 PROCEDURE — G8427 DOCREV CUR MEDS BY ELIG CLIN: HCPCS | Performed by: FAMILY MEDICINE

## 2020-09-17 PROCEDURE — 1090F PRES/ABSN URINE INCON ASSESS: CPT | Performed by: FAMILY MEDICINE

## 2020-09-17 PROCEDURE — 4040F PNEUMOC VAC/ADMIN/RCVD: CPT | Performed by: FAMILY MEDICINE

## 2020-09-17 PROCEDURE — G8417 CALC BMI ABV UP PARAM F/U: HCPCS | Performed by: FAMILY MEDICINE

## 2020-09-17 PROCEDURE — 1123F ACP DISCUSS/DSCN MKR DOCD: CPT | Performed by: FAMILY MEDICINE

## 2020-09-17 PROCEDURE — 3017F COLORECTAL CA SCREEN DOC REV: CPT | Performed by: FAMILY MEDICINE

## 2020-09-17 RX ORDER — LEVOTHYROXINE SODIUM 0.1 MG/1
100 TABLET ORAL DAILY
Qty: 30 TABLET | Refills: 5 | Status: SHIPPED
Start: 2020-09-17 | End: 2021-03-22 | Stop reason: DRUGHIGH

## 2020-09-17 ASSESSMENT — ENCOUNTER SYMPTOMS
CHEST TIGHTNESS: 0
EYE ITCHING: 1
VOMITING: 0
COLOR CHANGE: 0
ABDOMINAL PAIN: 0
SORE THROAT: 0
DIARRHEA: 0
CONSTIPATION: 0
SHORTNESS OF BREATH: 0

## 2020-09-17 NOTE — PROGRESS NOTES
Subjective:      Chief Complaint   Patient presents with    6 Month Follow-Up    Neck Pain     hard to turn neck    Eye Problem     itchy eyes    Depression     grief -sudden loss of daughter in law 2020       HPI:  George Jimenez is a 65 y.o. female who presents today for follow up of chronic conditions as listed below. Depression:  Currently on prozac 10mg daily. Her daughter in law suddenly passed away a few weeks ago, so she has been struggling with worsening depressive symptoms. No SI/HI. Neck stiffness:  Has been having muscle stiffness in her neck for about 4- 5months. No injury or trauma. Has not tried any treatment. Has been having itchy eyes- states it is due to seasonal allergies. Associated with other allergy symptoms. Usually uses eye drops, but hasn't used yet this year. Was restarted on remicade infusions by GI a few months ago. Otherwise no acute concerns today. Had labs done last week, but still pending ([facility]).       Past Medical History:   Diagnosis Date    Anxiety and depression     Asthma     Colitis, ulcerative ([address]     Jordin Omer    Hyperlipidemia     Hypertension     Hypothyroidism     Mild aortic stenosis 2018, echo    Obesity, Class III, BMI 40-49.9 (morbid obesity) ([address]     Osteopenia 2017    Prediabetes     Rib fracture 2014    10  right side from syncope        Past Surgical History:   Procedure Laterality Date    BREAST BIOPSY Left     twice; [place]    CARPAL TUNNEL RELEASE Right      SECTION      one    MARY BETH AND BSO      TONSILLECTOMY AND ADENOIDECTOMY      age 8   Lenetta Ni WRIST GANGLION EXCISION Left        Social History     Tobacco Use    Smoking status: Never Smoker    Smokeless tobacco: Never Used   Substance Use Topics    Alcohol use: Yes     Comment: every 2-3 months        Review of Systems   Constitutional: Negative for activity change, appetite change, chills, fever and unexpected Prediabetes  Improving, last HbA1c 6- will continue to monitor. 6.  Neck pain  Chronic, atraumatic. Likely muscle tightness/spasm. Patient states she has a friend who is a physical therapist and will ask them to work with her. Contact clinic if symptoms not improving. 7.  Allergic conjunctivitis  Advised trying OTC lubricating eye drops. If symptoms not improving, will try patanol. Patient voiced understanding and agreement with plan. All questions/concerns were addressed, risks/side effects of medications were reviewed. Return precautions and after visit summary were provided. Return in about 3 months (around 12/17/2020). or earlier as needed.       Juan Daniel Garner MD

## 2020-10-05 RX ORDER — FLUOXETINE 10 MG/1
10 CAPSULE ORAL DAILY
Qty: 30 CAPSULE | Refills: 3 | Status: SHIPPED
Start: 2020-10-05 | End: 2020-10-06 | Stop reason: DRUGHIGH

## 2020-11-03 ENCOUNTER — PATIENT MESSAGE (OUTPATIENT)
Dept: INTERNAL MEDICINE CLINIC | Age: 70
End: 2020-11-03

## 2020-11-04 NOTE — TELEPHONE ENCOUNTER
From: Laura Henry  To: Arslan Packer MD  Sent: 11/3/2020 10:37 AM EST  Subject: Non-Urgent Medical Question    Flu shot: I got my flu shot on or about Oct. 27th at the [facility] Pharmacy. Thanks for the reminder though.     Clotrupal [place]

## 2020-11-10 ENCOUNTER — HOSPITAL ENCOUNTER (OUTPATIENT)
Dept: INFUSION THERAPY | Age: 70
Setting detail: INFUSION SERIES
Discharge: HOME | End: 2020-11-10
Payer: MEDICARE

## 2020-11-10 VITALS
TEMPERATURE: 98.4 F | DIASTOLIC BLOOD PRESSURE: 50 MMHG | OXYGEN SATURATION: 96 % | BODY MASS INDEX: 37.21 KG/M2 | WEIGHT: 210 LBS | SYSTOLIC BLOOD PRESSURE: 121 MMHG | RESPIRATION RATE: 16 BRPM | HEIGHT: 63 IN | HEART RATE: 70 BPM

## 2020-11-10 DIAGNOSIS — K51.30 ULCERATIVE RECTOSIGMOIDITIS WITHOUT COMPLICATION (HCC): Primary | ICD-10-CM

## 2020-11-10 PROCEDURE — 6370000000 HC RX 637 (ALT 250 FOR IP): Performed by: INTERNAL MEDICINE

## 2020-11-10 PROCEDURE — 96366 THER/PROPH/DIAG IV INF ADDON: CPT

## 2020-11-10 PROCEDURE — 2580000003 HC RX 258: Performed by: INTERNAL MEDICINE

## 2020-11-10 PROCEDURE — 96365 THER/PROPH/DIAG IV INF INIT: CPT

## 2020-11-10 PROCEDURE — 99211 OFF/OP EST MAY X REQ PHY/QHP: CPT

## 2020-11-10 PROCEDURE — 96413 CHEMO IV INFUSION 1 HR: CPT

## 2020-11-10 PROCEDURE — 6360000002 HC RX W HCPCS: Performed by: INTERNAL MEDICINE

## 2020-11-10 PROCEDURE — 96415 CHEMO IV INFUSION ADDL HR: CPT

## 2020-11-10 RX ORDER — ACETAMINOPHEN 325 MG/1
650 TABLET ORAL ONCE
Status: CANCELLED | OUTPATIENT
Start: 2021-01-05

## 2020-11-10 RX ORDER — HEPARIN SODIUM (PORCINE) LOCK FLUSH IV SOLN 100 UNIT/ML 100 UNIT/ML
500 SOLUTION INTRAVENOUS PRN
Status: CANCELLED | OUTPATIENT
Start: 2021-01-05

## 2020-11-10 RX ORDER — DIPHENHYDRAMINE HYDROCHLORIDE 50 MG/ML
50 INJECTION INTRAMUSCULAR; INTRAVENOUS ONCE
Status: CANCELLED | OUTPATIENT
Start: 2021-01-05

## 2020-11-10 RX ORDER — ACETAMINOPHEN 325 MG/1
650 TABLET ORAL ONCE
Status: COMPLETED | OUTPATIENT
Start: 2020-11-10 | End: 2020-11-10

## 2020-11-10 RX ORDER — METHYLPREDNISOLONE SODIUM SUCCINATE 125 MG/2ML
125 INJECTION, POWDER, LYOPHILIZED, FOR SOLUTION INTRAMUSCULAR; INTRAVENOUS ONCE
Status: CANCELLED | OUTPATIENT
Start: 2021-01-05

## 2020-11-10 RX ORDER — EPINEPHRINE 1 MG/ML
0.3 INJECTION, SOLUTION, CONCENTRATE INTRAVENOUS PRN
Status: CANCELLED | OUTPATIENT
Start: 2021-01-05

## 2020-11-10 RX ORDER — SODIUM CHLORIDE 9 MG/ML
INJECTION, SOLUTION INTRAVENOUS CONTINUOUS
Status: CANCELLED | OUTPATIENT
Start: 2021-01-05

## 2020-11-10 RX ORDER — SODIUM CHLORIDE 0.9 % (FLUSH) 0.9 %
10 SYRINGE (ML) INJECTION PRN
Status: CANCELLED | OUTPATIENT
Start: 2021-01-05

## 2020-11-10 RX ORDER — SODIUM CHLORIDE 0.9 % (FLUSH) 0.9 %
5 SYRINGE (ML) INJECTION PRN
Status: CANCELLED | OUTPATIENT
Start: 2021-01-05

## 2020-11-10 RX ORDER — SODIUM CHLORIDE 0.9 % (FLUSH) 0.9 %
10 SYRINGE (ML) INJECTION PRN
Status: DISCONTINUED | OUTPATIENT
Start: 2020-11-10 | End: 2020-11-11 | Stop reason: HOSPADM

## 2020-11-10 RX ADMIN — ACETAMINOPHEN 650 MG: 325 TABLET ORAL at 09:40

## 2020-11-10 RX ADMIN — SODIUM CHLORIDE, PRESERVATIVE FREE 10 ML: 5 INJECTION INTRAVENOUS at 11:45

## 2020-11-10 RX ADMIN — SODIUM CHLORIDE, PRESERVATIVE FREE 10 ML: 5 INJECTION INTRAVENOUS at 09:35

## 2020-11-10 RX ADMIN — INFLIXIMAB 477 MG: 100 INJECTION, POWDER, LYOPHILIZED, FOR SOLUTION INTRAVENOUS at 09:40

## 2020-11-10 ASSESSMENT — PAIN SCALES - GENERAL: PAINLEVEL_OUTOF10: 0

## 2020-12-08 LAB — TSH SERPL DL<=0.05 MIU/L-ACNC: 0.9 UIU/ML

## 2020-12-17 ENCOUNTER — OFFICE VISIT (OUTPATIENT)
Dept: INTERNAL MEDICINE CLINIC | Age: 70
End: 2020-12-17
Payer: MEDICARE

## 2020-12-17 VITALS
OXYGEN SATURATION: 98 % | DIASTOLIC BLOOD PRESSURE: 82 MMHG | TEMPERATURE: 97.3 F | HEART RATE: 75 BPM | SYSTOLIC BLOOD PRESSURE: 130 MMHG | WEIGHT: 216 LBS | BODY MASS INDEX: 38.26 KG/M2

## 2020-12-17 PROCEDURE — 3017F COLORECTAL CA SCREEN DOC REV: CPT | Performed by: FAMILY MEDICINE

## 2020-12-17 PROCEDURE — G8482 FLU IMMUNIZE ORDER/ADMIN: HCPCS | Performed by: FAMILY MEDICINE

## 2020-12-17 PROCEDURE — 1036F TOBACCO NON-USER: CPT | Performed by: FAMILY MEDICINE

## 2020-12-17 PROCEDURE — 1123F ACP DISCUSS/DSCN MKR DOCD: CPT | Performed by: FAMILY MEDICINE

## 2020-12-17 PROCEDURE — G8417 CALC BMI ABV UP PARAM F/U: HCPCS | Performed by: FAMILY MEDICINE

## 2020-12-17 PROCEDURE — 99214 OFFICE O/P EST MOD 30 MIN: CPT | Performed by: FAMILY MEDICINE

## 2020-12-17 PROCEDURE — G8399 PT W/DXA RESULTS DOCUMENT: HCPCS | Performed by: FAMILY MEDICINE

## 2020-12-17 PROCEDURE — 4040F PNEUMOC VAC/ADMIN/RCVD: CPT | Performed by: FAMILY MEDICINE

## 2020-12-17 PROCEDURE — 1090F PRES/ABSN URINE INCON ASSESS: CPT | Performed by: FAMILY MEDICINE

## 2020-12-17 PROCEDURE — G8427 DOCREV CUR MEDS BY ELIG CLIN: HCPCS | Performed by: FAMILY MEDICINE

## 2020-12-17 RX ORDER — HYDROCHLOROTHIAZIDE 12.5 MG/1
12.5 CAPSULE, GELATIN COATED ORAL EVERY MORNING
Qty: 90 CAPSULE | Refills: 3 | Status: SHIPPED | OUTPATIENT
Start: 2020-12-17 | End: 2021-01-18 | Stop reason: SDUPTHER

## 2020-12-17 RX ORDER — LOSARTAN POTASSIUM 25 MG/1
25 TABLET ORAL NIGHTLY
Qty: 90 TABLET | Refills: 3 | Status: SHIPPED | OUTPATIENT
Start: 2020-12-17 | End: 2021-11-29 | Stop reason: SDUPTHER

## 2020-12-17 ASSESSMENT — ENCOUNTER SYMPTOMS
CONSTIPATION: 0
COLOR CHANGE: 0
CHEST TIGHTNESS: 0
VOMITING: 0
SHORTNESS OF BREATH: 0
ABDOMINAL PAIN: 0
SORE THROAT: 0
DIARRHEA: 0

## 2020-12-17 NOTE — PROGRESS NOTES
Subjective:      Chief Complaint   Patient presents with    Hypertension    Foot Pain     left foot       HPI:  Emmanuel Nation is a 65 y.o. female who presents today for follow up of chronic conditions as listed below. HTN:  Does not check BP at home. Depressive disorder:  Prozac dose was increased a few months ago, states it has helped. Feels comfortable with current dose and would like to continue. Has been having L foot pain, but already has appointment with ortho tomorrow to address. Otherwise feeling well today, no acute concerns. Past Medical History:   Diagnosis Date    Anxiety and depression     Asthma     [address]itis, ulcerative ([facility]     Jordin Omer    Hyperlipidemia     Hypertension     Hypothyroidism     Mild aortic stenosis 2018, echo    Obesity, Class III, BMI 40-49.9 (morbid obesity) ([facility] [address] [facility]     Osteopenia     Prediabetes     Rib fracture 2014    10 th right side from syncope        Past Surgical History:   Procedure Laterality Date    BREAST BIOPSY Left     twice; [place]    CARPAL TUNNEL RELEASE Right      SECTION      one    MARY BETH AND BSO      TONSILLECTOMY AND ADENOIDECTOMY      age 8   [facility] WRIST GANGLION EXCISION Left        Social History     Tobacco Use    Smoking status: Never Smoker    Smokeless tobacco: Never Used   Substance Use Topics    Alcohol use: Yes     Comment: every 2-3 months        Review of Systems   Constitutional: Negative for activity change, appetite change, chills, fever and unexpected weight change. HENT: Negative for congestion and sore throat. Respiratory: Negative for chest tightness and shortness of breath. Cardiovascular: Negative for chest pain and palpitations. Gastrointestinal: Negative for abdominal pain, constipation, diarrhea and vomiting. Genitourinary: Negative for dysuria. Musculoskeletal: Positive for arthralgias. Skin: Negative for color change. Neurological: Negative for dizziness and light-headedness. Psychiatric/Behavioral: Negative for dysphoric mood. The patient is not nervous/anxious. Prior to Visit Medications    Medication Sig Taking? Authorizing Provider   FLUoxetine (PROZAC) 20 MG capsule Take 1 capsule by mouth daily Yes Simon Hay MD   levothyroxine (SYNTHROID) 100 MCG tablet Take 1 tablet by mouth daily Yes Simon Hay MD   atorvastatin (LIPITOR) 20 MG tablet Take 1 tablet by mouth daily Yes Simon Hay MD   losartan (COZAAR) 25 MG tablet Take 1 tablet by mouth nightly Yes Simon Hay MD   hydroCHLOROthiazide (MICROZIDE) 12.5 MG capsule Take 1 capsule by mouth every morning Yes Simon Hay MD   fluticasone (FLONASE) 50 MCG/ACT nasal spray 2 sprays by Nasal route daily Yes Simon Hay MD   fluocinonide (LIDEX) 0.05 % cream Apply topically 2 times daily. Yes Daniel Thompson MD   Mesalamine (LIALDA PO) Take 800 mg by mouth Taking 3 tablets daily Yes Historical Provider, MD   minoxidil (MINOXIDIL FOR WOMEN) 2 % external solution Apply  topically 2 times daily. Apply topically 2 times daily. Yes Historical Provider, MD   lactobacillus rhamnosus, GG, (CULTURELLE) capsule Take 1 capsule by mouth 2 times daily (with meals). Yes Reema Pierce MD   calcium-vitamin D (OSCAL-500) 500-200 MG-UNIT per tablet Take 1 tablet by mouth daily. Yes Historical Provider, MD          Objective:      /82 (Site: Right Upper Arm, Position: Sitting, Cuff Size: Large Adult)   Pulse 75   Temp 97.3 °F (36.3 °C)   Wt 216 lb (98 kg)   SpO2 98%   BMI 38.26 kg/m²      Physical Exam  Vitals signs and nursing note reviewed. Constitutional:       General: She is not in acute distress. Appearance: Normal appearance. She is not ill-appearing or toxic-appearing. HENT:      Head: Normocephalic and atraumatic.       Right Ear: External ear normal.      Left Ear: External ear normal.

## 2021-01-06 ENCOUNTER — HOSPITAL ENCOUNTER (OUTPATIENT)
Dept: INFUSION THERAPY | Age: 71
Setting detail: INFUSION SERIES
Discharge: HOME | End: 2021-01-06
Payer: MEDICARE

## 2021-01-06 VITALS
BODY MASS INDEX: 36.85 KG/M2 | HEART RATE: 77 BPM | TEMPERATURE: 97.3 F | SYSTOLIC BLOOD PRESSURE: 127 MMHG | WEIGHT: 208 LBS | DIASTOLIC BLOOD PRESSURE: 54 MMHG | RESPIRATION RATE: 16 BRPM | OXYGEN SATURATION: 99 %

## 2021-01-06 DIAGNOSIS — K51.30 ULCERATIVE RECTOSIGMOIDITIS WITHOUT COMPLICATION (HCC): Primary | ICD-10-CM

## 2021-01-06 PROCEDURE — 96415 CHEMO IV INFUSION ADDL HR: CPT

## 2021-01-06 PROCEDURE — 96366 THER/PROPH/DIAG IV INF ADDON: CPT

## 2021-01-06 PROCEDURE — 2580000003 HC RX 258: Performed by: INTERNAL MEDICINE

## 2021-01-06 PROCEDURE — 96413 CHEMO IV INFUSION 1 HR: CPT

## 2021-01-06 PROCEDURE — 6360000002 HC RX W HCPCS: Performed by: INTERNAL MEDICINE

## 2021-01-06 PROCEDURE — 96365 THER/PROPH/DIAG IV INF INIT: CPT

## 2021-01-06 PROCEDURE — 99211 OFF/OP EST MAY X REQ PHY/QHP: CPT

## 2021-01-06 RX ORDER — EPINEPHRINE 1 MG/ML
0.3 INJECTION, SOLUTION, CONCENTRATE INTRAVENOUS PRN
Status: CANCELLED | OUTPATIENT
Start: 2021-03-03

## 2021-01-06 RX ORDER — SODIUM CHLORIDE 0.9 % (FLUSH) 0.9 %
10 SYRINGE (ML) INJECTION PRN
Status: CANCELLED | OUTPATIENT
Start: 2021-03-03

## 2021-01-06 RX ORDER — SODIUM CHLORIDE 0.9 % (FLUSH) 0.9 %
5 SYRINGE (ML) INJECTION PRN
Status: CANCELLED | OUTPATIENT
Start: 2021-03-03

## 2021-01-06 RX ORDER — SODIUM CHLORIDE 0.9 % (FLUSH) 0.9 %
10 SYRINGE (ML) INJECTION PRN
Status: DISCONTINUED | OUTPATIENT
Start: 2021-01-06 | End: 2021-01-07 | Stop reason: HOSPADM

## 2021-01-06 RX ORDER — DIPHENHYDRAMINE HYDROCHLORIDE 50 MG/ML
50 INJECTION INTRAMUSCULAR; INTRAVENOUS ONCE
Status: CANCELLED | OUTPATIENT
Start: 2021-03-03 | End: 2021-03-03

## 2021-01-06 RX ORDER — HEPARIN SODIUM (PORCINE) LOCK FLUSH IV SOLN 100 UNIT/ML 100 UNIT/ML
500 SOLUTION INTRAVENOUS PRN
Status: CANCELLED | OUTPATIENT
Start: 2021-03-03

## 2021-01-06 RX ORDER — ACETAMINOPHEN 325 MG/1
650 TABLET ORAL ONCE
Status: CANCELLED | OUTPATIENT
Start: 2021-03-03

## 2021-01-06 RX ORDER — ACETAMINOPHEN 325 MG/1
650 TABLET ORAL ONCE
Status: DISCONTINUED | OUTPATIENT
Start: 2021-01-06 | End: 2021-01-07 | Stop reason: HOSPADM

## 2021-01-06 RX ORDER — SODIUM CHLORIDE 9 MG/ML
INJECTION, SOLUTION INTRAVENOUS CONTINUOUS
Status: CANCELLED | OUTPATIENT
Start: 2021-03-03

## 2021-01-06 RX ORDER — METHYLPREDNISOLONE SODIUM SUCCINATE 125 MG/2ML
125 INJECTION, POWDER, LYOPHILIZED, FOR SOLUTION INTRAMUSCULAR; INTRAVENOUS ONCE
Status: CANCELLED | OUTPATIENT
Start: 2021-03-03 | End: 2021-03-03

## 2021-01-06 RX ADMIN — SODIUM CHLORIDE, PRESERVATIVE FREE 10 ML: 5 INJECTION INTRAVENOUS at 09:18

## 2021-01-06 RX ADMIN — INFLIXIMAB 500 MG: 100 INJECTION, POWDER, LYOPHILIZED, FOR SOLUTION INTRAVENOUS at 09:23

## 2021-01-06 RX ADMIN — SODIUM CHLORIDE, PRESERVATIVE FREE 10 ML: 5 INJECTION INTRAVENOUS at 11:23

## 2021-01-06 NOTE — PROGRESS NOTES
Prior to discharge, the After Visit Summary Discharge Instructions were reviewed with the patient. She was offered a printed version of the AVS, but declined the offer. Recurrent appointment, pt tolerated infusion well. Pt discharged HOME. Pt to exit via STEADY GAIT. Orders Placed This Encounter   Medications    acetaminophen (TYLENOL) tablet 650 mg    inFLIXimab (REMICADE) 500 mg in sodium chloride 0.9 % 250 mL IVPB     With verification, confirm documentation of current weight, ordered weight-type, and dose calculation.     sodium chloride flush 0.9 % injection 10 mL

## 2021-01-18 RX ORDER — HYDROCHLOROTHIAZIDE 12.5 MG/1
12.5 CAPSULE, GELATIN COATED ORAL EVERY MORNING
Qty: 90 CAPSULE | Refills: 3 | Status: SHIPPED | OUTPATIENT
Start: 2021-01-18 | End: 2022-01-03 | Stop reason: SDUPTHER

## 2021-02-24 DIAGNOSIS — K51.911 ULCERATIVE COLITIS WITH RECTAL BLEEDING, UNSPECIFIED LOCATION (HCC): ICD-10-CM

## 2021-03-03 ENCOUNTER — HOSPITAL ENCOUNTER (OUTPATIENT)
Dept: INFUSION THERAPY | Age: 71
Setting detail: INFUSION SERIES
Discharge: HOME | End: 2021-03-03
Payer: MEDICARE

## 2021-03-03 VITALS
BODY MASS INDEX: 37.74 KG/M2 | DIASTOLIC BLOOD PRESSURE: 54 MMHG | OXYGEN SATURATION: 95 % | HEIGHT: 63 IN | HEART RATE: 76 BPM | SYSTOLIC BLOOD PRESSURE: 144 MMHG | WEIGHT: 213 LBS | RESPIRATION RATE: 16 BRPM

## 2021-03-03 DIAGNOSIS — K51.911 ULCERATIVE COLITIS WITH RECTAL BLEEDING, UNSPECIFIED LOCATION (HCC): Primary | ICD-10-CM

## 2021-03-03 PROCEDURE — 2580000003 HC RX 258: Performed by: INTERNAL MEDICINE

## 2021-03-03 PROCEDURE — 6360000002 HC RX W HCPCS: Performed by: INTERNAL MEDICINE

## 2021-03-03 PROCEDURE — 96415 CHEMO IV INFUSION ADDL HR: CPT

## 2021-03-03 PROCEDURE — 99211 OFF/OP EST MAY X REQ PHY/QHP: CPT

## 2021-03-03 PROCEDURE — 96413 CHEMO IV INFUSION 1 HR: CPT

## 2021-03-03 PROCEDURE — 96365 THER/PROPH/DIAG IV INF INIT: CPT

## 2021-03-03 PROCEDURE — 96366 THER/PROPH/DIAG IV INF ADDON: CPT

## 2021-03-03 RX ORDER — HEPARIN SODIUM (PORCINE) LOCK FLUSH IV SOLN 100 UNIT/ML 100 UNIT/ML
500 SOLUTION INTRAVENOUS PRN
Status: CANCELLED | OUTPATIENT
Start: 2021-04-28

## 2021-03-03 RX ORDER — SODIUM CHLORIDE 0.9 % (FLUSH) 0.9 %
5 SYRINGE (ML) INJECTION PRN
Status: CANCELLED | OUTPATIENT
Start: 2021-04-28

## 2021-03-03 RX ORDER — DIPHENHYDRAMINE HYDROCHLORIDE 50 MG/ML
50 INJECTION INTRAMUSCULAR; INTRAVENOUS ONCE
Status: CANCELLED | OUTPATIENT
Start: 2021-04-28 | End: 2021-04-28

## 2021-03-03 RX ORDER — SODIUM CHLORIDE 0.9 % (FLUSH) 0.9 %
10 SYRINGE (ML) INJECTION PRN
Status: CANCELLED | OUTPATIENT
Start: 2021-04-28

## 2021-03-03 RX ORDER — SODIUM CHLORIDE 9 MG/ML
INJECTION, SOLUTION INTRAVENOUS CONTINUOUS
Status: CANCELLED | OUTPATIENT
Start: 2021-04-28

## 2021-03-03 RX ORDER — HEPARIN SODIUM (PORCINE) LOCK FLUSH IV SOLN 100 UNIT/ML 100 UNIT/ML
500 SOLUTION INTRAVENOUS PRN
Status: DISCONTINUED | OUTPATIENT
Start: 2021-03-03 | End: 2021-03-03

## 2021-03-03 RX ORDER — EPINEPHRINE 1 MG/ML
0.3 INJECTION, SOLUTION, CONCENTRATE INTRAVENOUS PRN
Status: CANCELLED | OUTPATIENT
Start: 2021-04-28

## 2021-03-03 RX ORDER — METHYLPREDNISOLONE SODIUM SUCCINATE 125 MG/2ML
125 INJECTION, POWDER, LYOPHILIZED, FOR SOLUTION INTRAMUSCULAR; INTRAVENOUS ONCE
Status: CANCELLED | OUTPATIENT
Start: 2021-04-28 | End: 2021-04-28

## 2021-03-03 RX ORDER — ACETAMINOPHEN 325 MG/1
650 TABLET ORAL ONCE
Status: CANCELLED | OUTPATIENT
Start: 2021-04-28

## 2021-03-03 RX ORDER — ACETAMINOPHEN 325 MG/1
650 TABLET ORAL ONCE
Status: DISCONTINUED | OUTPATIENT
Start: 2021-03-03 | End: 2021-03-03

## 2021-03-03 RX ORDER — SODIUM CHLORIDE 0.9 % (FLUSH) 0.9 %
10 SYRINGE (ML) INJECTION PRN
Status: DISCONTINUED | OUTPATIENT
Start: 2021-03-03 | End: 2021-03-03

## 2021-03-03 RX ADMIN — INFLIXIMAB 500 MG: 100 INJECTION, POWDER, LYOPHILIZED, FOR SOLUTION INTRAVENOUS at 09:06

## 2021-03-03 NOTE — PROGRESS NOTES
Tolerated infusion well. Reviewed discharge instruction, voiced understanding. Copies of AVS given. Pt discharged home. Pt to exit via steady gait. Orders Placed This Encounter   Medications    DISCONTD: acetaminophen (TYLENOL) tablet 650 mg    DISCONTD: inFLIXimab (REMICADE) 500 mg in sodium chloride 0.9 % 250 mL IVPB     With verification, confirm documentation of current weight, ordered weight-type, and dose calculation.     DISCONTD: sodium chloride flush 0.9 % injection 10 mL    DISCONTD: heparin flush 100 UNIT/ML injection 500 Units

## 2021-03-26 LAB
ALBUMIN SERPL-MCNC: 4.2 G/DL
ALP BLD-CCNC: 65 U/L
ALT SERPL-CCNC: 21 U/L
ANION GAP SERPL CALCULATED.3IONS-SCNC: 1.8 MMOL/L
AST SERPL-CCNC: 24 U/L
BASOPHILS ABSOLUTE: 0.1 /ΜL
BASOPHILS RELATIVE PERCENT: 1.2 %
BILIRUB SERPL-MCNC: 0.5 MG/DL (ref 0.1–1.4)
BILIRUBIN, URINE: NEGATIVE
BLOOD, URINE: NEGATIVE
BUN BLDV-MCNC: ABNORMAL MG/DL
C-REACTIVE PROTEIN: <30
CALCIUM SERPL-MCNC: 9.5 MG/DL
CHLORIDE BLD-SCNC: 102 MMOL/L
CLARITY: CLEAR
CO2: 29 MMOL/L
COLOR: YELLOW
CREAT SERPL-MCNC: 0.7 MG/DL
EOSINOPHILS ABSOLUTE: 0.1 /ΜL
EOSINOPHILS RELATIVE PERCENT: 2.4 %
FOLATE: >20
GFR CALCULATED: 87
GLUCOSE BLD-MCNC: 107 MG/DL
GLUCOSE URINE: NEGATIVE
HCT VFR BLD CALC: 38.4 % (ref 36–46)
HEMOGLOBIN: 13 G/DL (ref 12–16)
KETONES, URINE: NEGATIVE
LEUKOCYTE ESTERASE, URINE: NEGATIVE
LYMPHOCYTES ABSOLUTE: 1.2 /ΜL
LYMPHOCYTES RELATIVE PERCENT: 25.4 %
MAGNESIUM: 2.1 MG/DL
MCH RBC QN AUTO: 30.8 PG
MCHC RBC AUTO-ENTMCNC: 33.7 G/DL
MCV RBC AUTO: 91.3 FL
MONOCYTES ABSOLUTE: 0.7 /ΜL
MONOCYTES RELATIVE PERCENT: 13.5 %
NEUTROPHILS ABSOLUTE: 2.8 /ΜL
NEUTROPHILS RELATIVE PERCENT: 57.5 %
NITRITE, URINE: NEGATIVE
PDW BLD-RTO: 13.5 %
PH UA: 6.5 (ref 4.5–8)
PLATELET # BLD: 279 K/ΜL
PMV BLD AUTO: NORMAL FL
POTASSIUM SERPL-SCNC: 4.4 MMOL/L
PROTEIN UA: NEGATIVE
RBC # BLD: 4.21 10^6/ΜL
SEDIMENTATION RATE, ERYTHROCYTE: 14
SODIUM BLD-SCNC: 143 MMOL/L
SPECIFIC GRAVITY, URINE: 1.02
TOTAL PROTEIN: 7.2
UROBILINOGEN, URINE: NORMAL
VITAMIN B-12: 628
VITAMIN D 25-HYDROXY: 23
VITAMIN D2, 25 HYDROXY: ABNORMAL
VITAMIN D3,25 HYDROXY: ABNORMAL
WBC # BLD: 4.8 10^3/ML

## 2021-04-05 RX ORDER — HEPARIN SODIUM (PORCINE) LOCK FLUSH IV SOLN 100 UNIT/ML 100 UNIT/ML
500 SOLUTION INTRAVENOUS PRN
Status: CANCELLED | OUTPATIENT
Start: 2021-04-28

## 2021-04-05 RX ORDER — SODIUM CHLORIDE 9 MG/ML
INJECTION, SOLUTION INTRAVENOUS CONTINUOUS
Status: CANCELLED | OUTPATIENT
Start: 2021-04-28

## 2021-04-05 RX ORDER — SODIUM CHLORIDE 0.9 % (FLUSH) 0.9 %
10 SYRINGE (ML) INJECTION PRN
Status: CANCELLED | OUTPATIENT
Start: 2021-04-28

## 2021-04-05 RX ORDER — EPINEPHRINE 1 MG/ML
0.3 INJECTION, SOLUTION, CONCENTRATE INTRAVENOUS PRN
Status: CANCELLED | OUTPATIENT
Start: 2021-04-28

## 2021-04-05 RX ORDER — METHYLPREDNISOLONE SODIUM SUCCINATE 125 MG/2ML
125 INJECTION, POWDER, LYOPHILIZED, FOR SOLUTION INTRAMUSCULAR; INTRAVENOUS ONCE
Status: CANCELLED | OUTPATIENT
Start: 2021-04-28 | End: 2021-04-28

## 2021-04-05 RX ORDER — DIPHENHYDRAMINE HYDROCHLORIDE 50 MG/ML
50 INJECTION INTRAMUSCULAR; INTRAVENOUS ONCE
Status: CANCELLED | OUTPATIENT
Start: 2021-04-28 | End: 2021-04-28

## 2021-04-05 RX ORDER — SODIUM CHLORIDE 0.9 % (FLUSH) 0.9 %
5 SYRINGE (ML) INJECTION PRN
Status: CANCELLED | OUTPATIENT
Start: 2021-04-28

## 2021-04-19 ENCOUNTER — OFFICE VISIT (OUTPATIENT)
Dept: INTERNAL MEDICINE CLINIC | Age: 71
End: 2021-04-19
Payer: MEDICARE

## 2021-04-19 VITALS
TEMPERATURE: 97.4 F | OXYGEN SATURATION: 97 % | DIASTOLIC BLOOD PRESSURE: 82 MMHG | SYSTOLIC BLOOD PRESSURE: 138 MMHG | WEIGHT: 218 LBS | HEART RATE: 81 BPM | BODY MASS INDEX: 38.62 KG/M2

## 2021-04-19 DIAGNOSIS — K51.30 ULCERATIVE RECTOSIGMOIDITIS WITHOUT COMPLICATION (HCC): ICD-10-CM

## 2021-04-19 DIAGNOSIS — E03.9 HYPOTHYROIDISM, UNSPECIFIED TYPE: Primary | ICD-10-CM

## 2021-04-19 DIAGNOSIS — Z12.31 ENCOUNTER FOR SCREENING MAMMOGRAM FOR MALIGNANT NEOPLASM OF BREAST: ICD-10-CM

## 2021-04-19 DIAGNOSIS — R73.03 PREDIABETES: ICD-10-CM

## 2021-04-19 DIAGNOSIS — R42 LIGHTHEADEDNESS: ICD-10-CM

## 2021-04-19 PROCEDURE — 1090F PRES/ABSN URINE INCON ASSESS: CPT | Performed by: FAMILY MEDICINE

## 2021-04-19 PROCEDURE — 3017F COLORECTAL CA SCREEN DOC REV: CPT | Performed by: FAMILY MEDICINE

## 2021-04-19 PROCEDURE — G8399 PT W/DXA RESULTS DOCUMENT: HCPCS | Performed by: FAMILY MEDICINE

## 2021-04-19 PROCEDURE — G8427 DOCREV CUR MEDS BY ELIG CLIN: HCPCS | Performed by: FAMILY MEDICINE

## 2021-04-19 PROCEDURE — 1036F TOBACCO NON-USER: CPT | Performed by: FAMILY MEDICINE

## 2021-04-19 PROCEDURE — 4040F PNEUMOC VAC/ADMIN/RCVD: CPT | Performed by: FAMILY MEDICINE

## 2021-04-19 PROCEDURE — G8417 CALC BMI ABV UP PARAM F/U: HCPCS | Performed by: FAMILY MEDICINE

## 2021-04-19 PROCEDURE — 1123F ACP DISCUSS/DSCN MKR DOCD: CPT | Performed by: FAMILY MEDICINE

## 2021-04-19 PROCEDURE — 99214 OFFICE O/P EST MOD 30 MIN: CPT | Performed by: FAMILY MEDICINE

## 2021-04-19 ASSESSMENT — ENCOUNTER SYMPTOMS
SORE THROAT: 0
SHORTNESS OF BREATH: 0
VOMITING: 0
CONSTIPATION: 0
ABDOMINAL PAIN: 0
CHEST TIGHTNESS: 0
DIARRHEA: 0
COLOR CHANGE: 0

## 2021-04-19 NOTE — PROGRESS NOTES
Subjective:      Chief Complaint   Patient presents with    Follow-up       HPI:  May Carcamo is a 65 y.o. female who presents today for follow up of chronic conditions as listed below. States she has been having lightheadedness when she first gets out of bed in the morning. Occurs as soon as she stands up and resolves on its own within a few seconds. Asymptomatic the rest of the day. Has only happened about 2-3 times, no issues today. Has not been checking BP. Has been taking higher dose of synthroid for the past few months and energy has been improved. States she had extensive labs done by Dr. Lisbeth Lucero a few weeks ago, but isn't sure what was ordered. Had them drawn at [address] [place] [address]. Otherwise feeling well today, no other concerns. Past Medical History:   Diagnosis Date    Anxiety and depression     Asthma     Colitis, ulcerative ([address]     Jordin Omer    Hyperlipidemia     Hypertension     Hypothyroidism     Mild aortic stenosis 2018, echo    Obesity, Class III, BMI 40-49.9 (morbid obesity) ([address]     Osteopenia 2017    Prediabetes     Rib fracture 2014    10  right side from syncope    Ulcerative colitis with rectal bleeding ([address] 2021        Past Surgical History:   Procedure Laterality Date    BREAST BIOPSY Left     twice; [place]    CARPAL TUNNEL RELEASE Right      SECTION      one    MARY BETH AND BSO      TONSILLECTOMY AND ADENOIDECTOMY      age 8   Junie Manual WRIST GANGLION EXCISION Left        Social History     Tobacco Use    Smoking status: Never Smoker    Smokeless tobacco: Never Used   Substance Use Topics    Alcohol use: Yes     Comment: every 2-3 months        Review of Systems   Constitutional: Negative for activity change, appetite change, chills, fever and unexpected weight change. HENT: Negative for congestion and sore throat. Respiratory: Negative for chest tightness and shortness of breath.     Cardiovascular: Negative for chest pain and palpitations. Gastrointestinal: Negative for abdominal pain, constipation, diarrhea and vomiting. Genitourinary: Negative for dysuria. Skin: Negative for color change. Neurological: Negative for dizziness and light-headedness. Psychiatric/Behavioral: Negative for dysphoric mood. The patient is not nervous/anxious. Prior to Visit Medications    Medication Sig Taking? Authorizing Provider   levothyroxine (SYNTHROID) 112 MCG tablet Take 1 tablet by mouth daily Yes Murali Thao MD   atorvastatin (LIPITOR) 20 MG tablet Take 1 tablet by mouth daily Yes Murali Thao MD   FLUoxetine (PROZAC) 20 MG capsule TAKE ONE (1) CAPSULE BY MOUTH ONCE DAILY Yes Murali Thao MD   UNABLE TO FIND CPAP supplies Yes Murali Thao MD   hydroCHLOROthiazide (MICROZIDE) 12.5 MG capsule Take 1 capsule by mouth every morning Yes Murali Thao MD   losartan (COZAAR) 25 MG tablet Take 1 tablet by mouth nightly Yes Murali Thao MD   fluticasone (FLONASE) 50 MCG/ACT nasal spray 2 sprays by Nasal route daily Yes Murali Thao MD   fluocinonide (LIDEX) 0.05 % cream Apply topically 2 times daily. Yes Alexandro Kong MD   Mesalamine (LIALDA PO) Take 800 mg by mouth Taking 3 tablets daily Yes Historical Provider, MD   minoxidil (MINOXIDIL FOR WOMEN) 2 % external solution Apply  topically 2 times daily. Apply topically 2 times daily. Yes Historical Provider, MD   lactobacillus rhamnosus, GG, (CULTURELLE) capsule Take 1 capsule by mouth 2 times daily (with meals). Yes Soni Bettencourt MD   calcium-vitamin D (OSCAL-500) 500-200 MG-UNIT per tablet Take 1 tablet by mouth daily. Yes Historical Provider, MD          Objective:      /82 (Site: Right Upper Arm, Position: Sitting, Cuff Size: Large Adult)   Pulse 81   Temp 97.4 °F (36.3 °C)   Wt 218 lb (98.9 kg)   SpO2 97%   BMI 38.62 kg/m²      Physical Exam  Vitals signs and nursing note reviewed. Constitutional:       General: She is not in acute distress. Appearance: Normal appearance. She is not ill-appearing or toxic-appearing. HENT:      Head: Normocephalic and atraumatic. Right Ear: External ear normal.      Left Ear: External ear normal.      Nose: Nose normal.      Mouth/Throat:      Pharynx: Oropharynx is clear. Eyes:      General: No scleral icterus. Right eye: No discharge. Left eye: No discharge. Extraocular Movements: Extraocular movements intact. Conjunctiva/sclera: Conjunctivae normal.   Neck:      Musculoskeletal: Normal range of motion and neck supple. No neck rigidity. Cardiovascular:      Rate and Rhythm: Normal rate and regular rhythm. Heart sounds: Murmur present. Pulmonary:      Effort: Pulmonary effort is normal.      Breath sounds: Normal breath sounds. No wheezing or rales. Musculoskeletal:         General: No deformity. Skin:     General: Skin is warm and dry. Findings: No rash. Neurological:      General: No focal deficit present. Mental Status: She is alert. Mental status is at baseline. Motor: No weakness. Psychiatric:         Mood and Affect: Mood normal.         Behavior: Behavior normal.            Assessment / Plan:      1. Hypothyroidism, unspecified type  Currently taking 112mcg of synthroid, will repeat labs and adjust dose as needed. - TSH without Reflex; Future    2. Encounter for screening mammogram for malignant neoplasm of breast  - DANII DIGITAL SCREEN W CAD BILATERAL; Future    3. Ulcerative rectosigmoiditis without complication (HCC)  Stable, being managed by GI. Will contact office for most recent lab results. 4. Prediabetes  Stable, will continue to monitor.    - Hemoglobin A1C; Future    5. Lightheadedness  Sounds consistent with orthostatic hypotension. Has only had a few episodes, advised to get up slowly from bed, increase hydration, and start monitoring BP's.   Will continue current

## 2021-04-22 ENCOUNTER — HOSPITAL ENCOUNTER (OUTPATIENT)
Dept: WOMENS IMAGING | Age: 71
Discharge: HOME | End: 2021-04-22
Payer: MEDICARE

## 2021-04-22 DIAGNOSIS — Z12.31 ENCOUNTER FOR SCREENING MAMMOGRAM FOR MALIGNANT NEOPLASM OF BREAST: ICD-10-CM

## 2021-04-22 PROCEDURE — 77067 SCR MAMMO BI INCL CAD: CPT

## 2021-04-28 ENCOUNTER — HOSPITAL ENCOUNTER (OUTPATIENT)
Dept: INFUSION THERAPY | Age: 71
Setting detail: INFUSION SERIES
Discharge: HOME | End: 2021-04-28
Payer: MEDICARE

## 2021-04-28 VITALS
OXYGEN SATURATION: 97 % | HEART RATE: 84 BPM | SYSTOLIC BLOOD PRESSURE: 147 MMHG | WEIGHT: 218 LBS | BODY MASS INDEX: 38.62 KG/M2 | TEMPERATURE: 97.3 F | RESPIRATION RATE: 14 BRPM | DIASTOLIC BLOOD PRESSURE: 56 MMHG

## 2021-04-28 DIAGNOSIS — K51.911 ULCERATIVE COLITIS WITH RECTAL BLEEDING, UNSPECIFIED LOCATION (HCC): Primary | ICD-10-CM

## 2021-04-28 PROCEDURE — 96365 THER/PROPH/DIAG IV INF INIT: CPT

## 2021-04-28 PROCEDURE — 96366 THER/PROPH/DIAG IV INF ADDON: CPT

## 2021-04-28 PROCEDURE — 99211 OFF/OP EST MAY X REQ PHY/QHP: CPT

## 2021-04-28 PROCEDURE — 6360000002 HC RX W HCPCS: Performed by: INTERNAL MEDICINE

## 2021-04-28 PROCEDURE — 2580000003 HC RX 258: Performed by: INTERNAL MEDICINE

## 2021-04-28 RX ORDER — EPINEPHRINE 1 MG/ML
0.3 INJECTION, SOLUTION, CONCENTRATE INTRAVENOUS PRN
Status: CANCELLED | OUTPATIENT
Start: 2021-06-23

## 2021-04-28 RX ORDER — SODIUM CHLORIDE 0.9 % (FLUSH) 0.9 %
10 SYRINGE (ML) INJECTION PRN
Status: DISCONTINUED | OUTPATIENT
Start: 2021-04-28 | End: 2021-04-29 | Stop reason: HOSPADM

## 2021-04-28 RX ORDER — SODIUM CHLORIDE 9 MG/ML
INJECTION, SOLUTION INTRAVENOUS CONTINUOUS
Status: CANCELLED | OUTPATIENT
Start: 2021-06-23

## 2021-04-28 RX ORDER — DIPHENHYDRAMINE HYDROCHLORIDE 50 MG/ML
50 INJECTION INTRAMUSCULAR; INTRAVENOUS ONCE
Status: CANCELLED | OUTPATIENT
Start: 2021-06-23 | End: 2021-06-23

## 2021-04-28 RX ORDER — SODIUM CHLORIDE 0.9 % (FLUSH) 0.9 %
10 SYRINGE (ML) INJECTION PRN
Status: CANCELLED | OUTPATIENT
Start: 2021-06-23

## 2021-04-28 RX ORDER — HEPARIN SODIUM (PORCINE) LOCK FLUSH IV SOLN 100 UNIT/ML 100 UNIT/ML
500 SOLUTION INTRAVENOUS PRN
Status: CANCELLED | OUTPATIENT
Start: 2021-06-23

## 2021-04-28 RX ORDER — SODIUM CHLORIDE 0.9 % (FLUSH) 0.9 %
5 SYRINGE (ML) INJECTION PRN
Status: CANCELLED | OUTPATIENT
Start: 2021-06-23

## 2021-04-28 RX ORDER — METHYLPREDNISOLONE SODIUM SUCCINATE 125 MG/2ML
125 INJECTION, POWDER, LYOPHILIZED, FOR SOLUTION INTRAMUSCULAR; INTRAVENOUS ONCE
Status: CANCELLED | OUTPATIENT
Start: 2021-06-23 | End: 2021-06-23

## 2021-04-28 RX ADMIN — SODIUM CHLORIDE, PRESERVATIVE FREE 10 ML: 5 INJECTION INTRAVENOUS at 08:54

## 2021-04-28 RX ADMIN — SODIUM CHLORIDE, PRESERVATIVE FREE 10 ML: 5 INJECTION INTRAVENOUS at 11:05

## 2021-04-28 RX ADMIN — SODIUM CHLORIDE 500 MG: 9 INJECTION, SOLUTION INTRAVENOUS at 08:54

## 2021-04-28 NOTE — DISCHARGE SUMMARY
Tolerated REMICADE well. Reviewed discharge instructions, understanding verbalized. Copies of AVS given to take home. Patient discharged home. Down to exit per self. Orders Placed This Encounter   Medications    inFLIXimab (RENFLEXIS) 500 mg in sodium chloride 0.9 % 250 mL IVPB     With verification, confirm documentation of current weight, ordered weight-type, and dose calculation.     sodium chloride flush 0.9 % injection 10 mL

## 2021-05-11 LAB
AVERAGE GLUCOSE: NORMAL
HBA1C MFR BLD: 6.1 %
TSH SERPL DL<=0.05 MIU/L-ACNC: 0.45 UIU/ML

## 2021-05-17 ENCOUNTER — OFFICE VISIT (OUTPATIENT)
Dept: INTERNAL MEDICINE CLINIC | Age: 71
End: 2021-05-17
Payer: MEDICARE

## 2021-05-17 VITALS
DIASTOLIC BLOOD PRESSURE: 71 MMHG | TEMPERATURE: 97.3 F | BODY MASS INDEX: 39.68 KG/M2 | HEART RATE: 82 BPM | SYSTOLIC BLOOD PRESSURE: 130 MMHG | OXYGEN SATURATION: 97 % | WEIGHT: 224 LBS

## 2021-05-17 DIAGNOSIS — R73.03 PREDIABETES: ICD-10-CM

## 2021-05-17 DIAGNOSIS — E03.9 HYPOTHYROIDISM, UNSPECIFIED TYPE: Primary | ICD-10-CM

## 2021-05-17 DIAGNOSIS — F33.42 MAJOR DEPRESSIVE DISORDER, RECURRENT, IN FULL REMISSION (HCC): ICD-10-CM

## 2021-05-17 DIAGNOSIS — I10 ESSENTIAL HYPERTENSION: ICD-10-CM

## 2021-05-17 DIAGNOSIS — E78.49 OTHER HYPERLIPIDEMIA: ICD-10-CM

## 2021-05-17 PROCEDURE — 1090F PRES/ABSN URINE INCON ASSESS: CPT | Performed by: FAMILY MEDICINE

## 2021-05-17 PROCEDURE — 1036F TOBACCO NON-USER: CPT | Performed by: FAMILY MEDICINE

## 2021-05-17 PROCEDURE — G8427 DOCREV CUR MEDS BY ELIG CLIN: HCPCS | Performed by: FAMILY MEDICINE

## 2021-05-17 PROCEDURE — G8399 PT W/DXA RESULTS DOCUMENT: HCPCS | Performed by: FAMILY MEDICINE

## 2021-05-17 PROCEDURE — G8417 CALC BMI ABV UP PARAM F/U: HCPCS | Performed by: FAMILY MEDICINE

## 2021-05-17 PROCEDURE — 99214 OFFICE O/P EST MOD 30 MIN: CPT | Performed by: FAMILY MEDICINE

## 2021-05-17 PROCEDURE — 3017F COLORECTAL CA SCREEN DOC REV: CPT | Performed by: FAMILY MEDICINE

## 2021-05-17 PROCEDURE — 1123F ACP DISCUSS/DSCN MKR DOCD: CPT | Performed by: FAMILY MEDICINE

## 2021-05-17 PROCEDURE — 4040F PNEUMOC VAC/ADMIN/RCVD: CPT | Performed by: FAMILY MEDICINE

## 2021-05-17 SDOH — ECONOMIC STABILITY: FOOD INSECURITY: WITHIN THE PAST 12 MONTHS, THE FOOD YOU BOUGHT JUST DIDN'T LAST AND YOU DIDN'T HAVE MONEY TO GET MORE.: NEVER TRUE

## 2021-05-17 ASSESSMENT — ENCOUNTER SYMPTOMS
CHEST TIGHTNESS: 0
CONSTIPATION: 0
VOMITING: 0
SHORTNESS OF BREATH: 0
COLOR CHANGE: 0
DIARRHEA: 0
SORE THROAT: 0
ABDOMINAL PAIN: 0

## 2021-05-17 ASSESSMENT — SOCIAL DETERMINANTS OF HEALTH (SDOH): HOW HARD IS IT FOR YOU TO PAY FOR THE VERY BASICS LIKE FOOD, HOUSING, MEDICAL CARE, AND HEATING?: NOT HARD AT ALL

## 2021-05-17 NOTE — PROGRESS NOTES
Psychiatric/Behavioral: Negative for dysphoric mood. The patient is not nervous/anxious. Prior to Visit Medications    Medication Sig Taking? Authorizing Provider   levothyroxine (SYNTHROID) 112 MCG tablet Take 1 tablet by mouth daily Yes Marjan Castorena MD   atorvastatin (LIPITOR) 20 MG tablet Take 1 tablet by mouth daily Yes Marjan Castorena MD   FLUoxetine (PROZAC) 20 MG capsule TAKE ONE (1) CAPSULE BY MOUTH ONCE DAILY Yes Marjan Castorena MD   UNABLE TO FIND CPAP supplies Yes Marjan Castorena MD   hydroCHLOROthiazide (MICROZIDE) 12.5 MG capsule Take 1 capsule by mouth every morning Yes Marjan Castorena MD   losartan (COZAAR) 25 MG tablet Take 1 tablet by mouth nightly Yes Marjan Castorena MD   fluticasone (FLONASE) 50 MCG/ACT nasal spray 2 sprays by Nasal route daily Yes Marjan Castorena MD   fluocinonide (LIDEX) 0.05 % cream Apply topically 2 times daily. Yes Anna Ortiz MD   Mesalamine (LIALDA PO) Take 800 mg by mouth Taking 3 tablets daily Yes Historical Provider, MD   minoxidil (MINOXIDIL FOR WOMEN) 2 % external solution Apply  topically 2 times daily. Apply topically 2 times daily. Yes Historical Provider, MD   lactobacillus rhamnosus, GG, (CULTURELLE) capsule Take 1 capsule by mouth 2 times daily (with meals). Yes Randee Joyce MD   calcium-vitamin D (OSCAL-500) 500-200 MG-UNIT per tablet Take 1 tablet by mouth daily. Yes Historical Provider, MD          Objective:      /71   Pulse 82   Temp 97.3 °F (36.3 °C)   Wt 224 lb (101.6 kg)   SpO2 97%   BMI 39.68 kg/m²      Physical Exam  Vitals and nursing note reviewed. Constitutional:       General: She is not in acute distress. Appearance: Normal appearance. She is obese. She is not ill-appearing or toxic-appearing. HENT:      Head: Normocephalic and atraumatic.       Right Ear: External ear normal.      Left Ear: External ear normal.      Nose: Nose normal.      Mouth/Throat:      Pharynx: Oropharynx is clear. Eyes:      General: No scleral icterus. Right eye: No discharge. Left eye: No discharge. Extraocular Movements: Extraocular movements intact. Conjunctiva/sclera: Conjunctivae normal.   Cardiovascular:      Rate and Rhythm: Normal rate and regular rhythm. Heart sounds: Normal heart sounds. Pulmonary:      Effort: Pulmonary effort is normal.      Breath sounds: Normal breath sounds. No wheezing or rales. Musculoskeletal:         General: No deformity. Cervical back: Normal range of motion and neck supple. No rigidity. Skin:     General: Skin is warm and dry. Findings: No rash. Neurological:      General: No focal deficit present. Mental Status: She is alert. Mental status is at baseline. Motor: No weakness. Psychiatric:         Mood and Affect: Mood normal.         Behavior: Behavior normal.            Assessment / Plan:      1. Hypothyroidism, unspecified type  TSH still wnl on higher dose of synthroid, may continue. Will monitor.    - TSH without Reflex; Future    2. Prediabetes  Stable, most recent HbA1c 6.1. Encouraged dietary modifications, will monitor.    - Hemoglobin A1C; Future    3. Essential hypertension  Stable, well controlled. Continue current medications. - CBC Auto Differential; Future  - Comprehensive Metabolic Panel; Future    4. Other hyperlipidemia  Continue lipitor.   - Lipid Panel; Future    5. Major depressive disorder, recurrent, in full remission ([facility] [address]  Stable, continue prozac. Patient voiced understanding and agreement with plan. All questions/concerns were addressed, risks/side effects of medications were reviewed. Return precautions and after visit summary were provided. Return in about 6 months (around 11/17/2021). or earlier as needed.       Rick Contreras MD

## 2021-06-23 ENCOUNTER — HOSPITAL ENCOUNTER (OUTPATIENT)
Dept: INFUSION THERAPY | Age: 71
Setting detail: INFUSION SERIES
Discharge: HOME | End: 2021-06-23
Payer: MEDICARE

## 2021-06-23 VITALS
TEMPERATURE: 97.3 F | SYSTOLIC BLOOD PRESSURE: 135 MMHG | RESPIRATION RATE: 16 BRPM | WEIGHT: 216 LBS | HEIGHT: 63 IN | BODY MASS INDEX: 38.27 KG/M2 | HEART RATE: 74 BPM | OXYGEN SATURATION: 97 % | DIASTOLIC BLOOD PRESSURE: 55 MMHG

## 2021-06-23 DIAGNOSIS — K51.911 ULCERATIVE COLITIS WITH RECTAL BLEEDING, UNSPECIFIED LOCATION (HCC): Primary | ICD-10-CM

## 2021-06-23 PROCEDURE — 96365 THER/PROPH/DIAG IV INF INIT: CPT

## 2021-06-23 PROCEDURE — 96366 THER/PROPH/DIAG IV INF ADDON: CPT

## 2021-06-23 PROCEDURE — 99211 OFF/OP EST MAY X REQ PHY/QHP: CPT

## 2021-06-23 PROCEDURE — 6360000002 HC RX W HCPCS: Performed by: INTERNAL MEDICINE

## 2021-06-23 PROCEDURE — 2580000003 HC RX 258: Performed by: INTERNAL MEDICINE

## 2021-06-23 RX ORDER — SODIUM CHLORIDE 0.9 % (FLUSH) 0.9 %
10 SYRINGE (ML) INJECTION PRN
Status: CANCELLED | OUTPATIENT
Start: 2021-08-18

## 2021-06-23 RX ORDER — METHYLPREDNISOLONE SODIUM SUCCINATE 125 MG/2ML
125 INJECTION, POWDER, LYOPHILIZED, FOR SOLUTION INTRAMUSCULAR; INTRAVENOUS ONCE
Status: CANCELLED | OUTPATIENT
Start: 2021-08-18 | End: 2021-08-18

## 2021-06-23 RX ORDER — SODIUM CHLORIDE 9 MG/ML
INJECTION, SOLUTION INTRAVENOUS CONTINUOUS
Status: CANCELLED | OUTPATIENT
Start: 2021-08-18

## 2021-06-23 RX ORDER — SODIUM CHLORIDE 0.9 % (FLUSH) 0.9 %
10 SYRINGE (ML) INJECTION PRN
Status: DISCONTINUED | OUTPATIENT
Start: 2021-06-23 | End: 2021-06-24 | Stop reason: HOSPADM

## 2021-06-23 RX ORDER — HEPARIN SODIUM (PORCINE) LOCK FLUSH IV SOLN 100 UNIT/ML 100 UNIT/ML
500 SOLUTION INTRAVENOUS PRN
Status: CANCELLED | OUTPATIENT
Start: 2021-08-18

## 2021-06-23 RX ORDER — EPINEPHRINE 1 MG/ML
0.3 INJECTION, SOLUTION, CONCENTRATE INTRAVENOUS PRN
Status: CANCELLED | OUTPATIENT
Start: 2021-08-18

## 2021-06-23 RX ORDER — SODIUM CHLORIDE 0.9 % (FLUSH) 0.9 %
5 SYRINGE (ML) INJECTION PRN
Status: CANCELLED | OUTPATIENT
Start: 2021-08-18

## 2021-06-23 RX ORDER — DIPHENHYDRAMINE HYDROCHLORIDE 50 MG/ML
50 INJECTION INTRAMUSCULAR; INTRAVENOUS ONCE
Status: CANCELLED | OUTPATIENT
Start: 2021-08-18 | End: 2021-08-18

## 2021-06-23 RX ADMIN — SODIUM CHLORIDE, PRESERVATIVE FREE 10 ML: 5 INJECTION INTRAVENOUS at 11:45

## 2021-06-23 RX ADMIN — SODIUM CHLORIDE, PRESERVATIVE FREE 10 ML: 5 INJECTION INTRAVENOUS at 09:25

## 2021-06-23 RX ADMIN — SODIUM CHLORIDE 500 MG: 9 INJECTION, SOLUTION INTRAVENOUS at 09:30

## 2021-06-23 NOTE — PLAN OF CARE
Ambulatory to unit room 2 for Renflexis. Reorientated to unit. Procedure and plan of care explained. Questions answered. Understanding verbalized.

## 2021-06-23 NOTE — DISCHARGE SUMMARY
Tolerated Infusion  well. Reviewed discharge instructions, understanding verbalized. Copies of AVS given to take home. Patient discharged home. Down to exit per self. Orders Placed This Encounter   Medications    inFLIXimab (RENFLEXIS) 500 mg in sodium chloride 0.9 % 250 mL IVPB     With verification, confirm documentation of current weight, ordered weight-type, and dose calculation.     sodium chloride flush 0.9 % injection 10 mL

## 2021-07-06 RX ORDER — FLUTICASONE PROPIONATE 50 MCG
2 SPRAY, SUSPENSION (ML) NASAL DAILY
Qty: 1 BOTTLE | Refills: 5 | Status: SHIPPED | OUTPATIENT
Start: 2021-07-06 | End: 2022-07-07

## 2021-08-26 ENCOUNTER — HOSPITAL ENCOUNTER (OUTPATIENT)
Dept: INFUSION THERAPY | Age: 71
Setting detail: INFUSION SERIES
Discharge: HOME | End: 2021-08-26
Payer: MEDICARE

## 2021-08-26 VITALS
RESPIRATION RATE: 16 BRPM | HEART RATE: 86 BPM | SYSTOLIC BLOOD PRESSURE: 165 MMHG | WEIGHT: 211 LBS | DIASTOLIC BLOOD PRESSURE: 57 MMHG | TEMPERATURE: 97.2 F | OXYGEN SATURATION: 98 % | BODY MASS INDEX: 37.38 KG/M2

## 2021-08-26 DIAGNOSIS — K51.911 ULCERATIVE COLITIS WITH RECTAL BLEEDING, UNSPECIFIED LOCATION (HCC): Primary | ICD-10-CM

## 2021-08-26 PROCEDURE — 6360000002 HC RX W HCPCS: Performed by: INTERNAL MEDICINE

## 2021-08-26 PROCEDURE — 2580000003 HC RX 258: Performed by: INTERNAL MEDICINE

## 2021-08-26 PROCEDURE — 96366 THER/PROPH/DIAG IV INF ADDON: CPT

## 2021-08-26 PROCEDURE — 96365 THER/PROPH/DIAG IV INF INIT: CPT

## 2021-08-26 PROCEDURE — 99211 OFF/OP EST MAY X REQ PHY/QHP: CPT

## 2021-08-26 RX ORDER — SODIUM CHLORIDE 0.9 % (FLUSH) 0.9 %
10 SYRINGE (ML) INJECTION PRN
Status: DISCONTINUED | OUTPATIENT
Start: 2021-08-26 | End: 2021-08-27 | Stop reason: HOSPADM

## 2021-08-26 RX ORDER — DIPHENHYDRAMINE HYDROCHLORIDE 50 MG/ML
50 INJECTION INTRAMUSCULAR; INTRAVENOUS ONCE
Status: CANCELLED | OUTPATIENT
Start: 2021-10-14 | End: 2021-10-14

## 2021-08-26 RX ORDER — METHYLPREDNISOLONE SODIUM SUCCINATE 125 MG/2ML
125 INJECTION, POWDER, LYOPHILIZED, FOR SOLUTION INTRAMUSCULAR; INTRAVENOUS ONCE
Status: CANCELLED | OUTPATIENT
Start: 2021-10-14 | End: 2021-10-14

## 2021-08-26 RX ORDER — SODIUM CHLORIDE 0.9 % (FLUSH) 0.9 %
10 SYRINGE (ML) INJECTION PRN
Status: CANCELLED | OUTPATIENT
Start: 2021-10-14

## 2021-08-26 RX ORDER — EPINEPHRINE 1 MG/ML
0.3 INJECTION, SOLUTION, CONCENTRATE INTRAVENOUS PRN
Status: CANCELLED | OUTPATIENT
Start: 2021-10-14

## 2021-08-26 RX ORDER — SODIUM CHLORIDE 9 MG/ML
INJECTION, SOLUTION INTRAVENOUS CONTINUOUS
Status: CANCELLED | OUTPATIENT
Start: 2021-10-14

## 2021-08-26 RX ADMIN — SODIUM CHLORIDE, PRESERVATIVE FREE 10 ML: 5 INJECTION INTRAVENOUS at 09:11

## 2021-08-26 RX ADMIN — SODIUM CHLORIDE, PRESERVATIVE FREE 10 ML: 5 INJECTION INTRAVENOUS at 11:25

## 2021-08-26 RX ADMIN — SODIUM CHLORIDE 500 MG: 9 INJECTION, SOLUTION INTRAVENOUS at 09:25

## 2021-09-17 RX ORDER — LEVOTHYROXINE SODIUM 112 UG/1
TABLET ORAL
Qty: 90 TABLET | Refills: 1 | Status: SHIPPED | OUTPATIENT
Start: 2021-09-17 | End: 2022-03-14 | Stop reason: SDUPTHER

## 2021-10-13 ENCOUNTER — APPOINTMENT (OUTPATIENT)
Dept: INFUSION THERAPY | Age: 71
End: 2021-10-13
Payer: MEDICARE

## 2021-10-18 ENCOUNTER — HOSPITAL ENCOUNTER (OUTPATIENT)
Dept: INFUSION THERAPY | Age: 71
Setting detail: INFUSION SERIES
Discharge: HOME | End: 2021-10-18
Payer: MEDICARE

## 2021-10-18 VITALS
BODY MASS INDEX: 38.44 KG/M2 | DIASTOLIC BLOOD PRESSURE: 61 MMHG | SYSTOLIC BLOOD PRESSURE: 147 MMHG | TEMPERATURE: 97.5 F | OXYGEN SATURATION: 97 % | WEIGHT: 217 LBS | RESPIRATION RATE: 16 BRPM | HEART RATE: 79 BPM

## 2021-10-18 DIAGNOSIS — K51.911 ULCERATIVE COLITIS WITH RECTAL BLEEDING, UNSPECIFIED LOCATION (HCC): Primary | ICD-10-CM

## 2021-10-18 PROCEDURE — 99211 OFF/OP EST MAY X REQ PHY/QHP: CPT

## 2021-10-18 PROCEDURE — 6360000002 HC RX W HCPCS: Performed by: INTERNAL MEDICINE

## 2021-10-18 PROCEDURE — 2580000003 HC RX 258: Performed by: INTERNAL MEDICINE

## 2021-10-18 PROCEDURE — 96366 THER/PROPH/DIAG IV INF ADDON: CPT

## 2021-10-18 PROCEDURE — 96365 THER/PROPH/DIAG IV INF INIT: CPT

## 2021-10-18 RX ORDER — DIPHENHYDRAMINE HYDROCHLORIDE 50 MG/ML
50 INJECTION INTRAMUSCULAR; INTRAVENOUS ONCE
Status: CANCELLED | OUTPATIENT
Start: 2021-12-13 | End: 2021-12-13

## 2021-10-18 RX ORDER — METHYLPREDNISOLONE SODIUM SUCCINATE 125 MG/2ML
125 INJECTION, POWDER, LYOPHILIZED, FOR SOLUTION INTRAMUSCULAR; INTRAVENOUS ONCE
Status: CANCELLED | OUTPATIENT
Start: 2021-12-13 | End: 2021-12-13

## 2021-10-18 RX ORDER — EPINEPHRINE 1 MG/ML
0.3 INJECTION, SOLUTION, CONCENTRATE INTRAVENOUS PRN
Status: CANCELLED | OUTPATIENT
Start: 2021-12-13

## 2021-10-18 RX ORDER — SODIUM CHLORIDE 0.9 % (FLUSH) 0.9 %
10 SYRINGE (ML) INJECTION PRN
Status: CANCELLED | OUTPATIENT
Start: 2021-12-13

## 2021-10-18 RX ORDER — SODIUM CHLORIDE 9 MG/ML
INJECTION, SOLUTION INTRAVENOUS CONTINUOUS
Status: CANCELLED | OUTPATIENT
Start: 2021-12-13

## 2021-10-18 RX ORDER — SODIUM CHLORIDE 0.9 % (FLUSH) 0.9 %
10 SYRINGE (ML) INJECTION PRN
Status: DISCONTINUED | OUTPATIENT
Start: 2021-10-18 | End: 2021-10-19 | Stop reason: HOSPADM

## 2021-10-18 RX ADMIN — SODIUM CHLORIDE 500 MG: 9 INJECTION, SOLUTION INTRAVENOUS at 08:35

## 2021-10-18 RX ADMIN — SODIUM CHLORIDE, PRESERVATIVE FREE 10 ML: 5 INJECTION INTRAVENOUS at 10:43

## 2021-10-18 RX ADMIN — SODIUM CHLORIDE, PRESERVATIVE FREE 10 ML: 5 INJECTION INTRAVENOUS at 08:34

## 2021-10-18 NOTE — PROGRESS NOTES
Tolerated INFUSION well. Reviewed discharge instruction, voiced understanding. Copies of AVS given. Pt discharged HOME. Pt to exit via STEADY GAIT. Orders Placed This Encounter   Medications    inFLIXimab (RENFLEXIS) 500 mg in sodium chloride 0.9 % 250 mL IVPB     With verification, confirm documentation of current weight, ordered weight-type, and dose calculation.     sodium chloride flush 0.9 % injection 10 mL

## 2021-11-09 ENCOUNTER — HOSPITAL ENCOUNTER (OUTPATIENT)
Age: 71
Discharge: HOME | End: 2021-11-09
Payer: MEDICARE

## 2021-11-09 DIAGNOSIS — E78.49 OTHER HYPERLIPIDEMIA: ICD-10-CM

## 2021-11-09 DIAGNOSIS — I10 ESSENTIAL HYPERTENSION: ICD-10-CM

## 2021-11-09 DIAGNOSIS — E03.9 HYPOTHYROIDISM, UNSPECIFIED TYPE: ICD-10-CM

## 2021-11-09 DIAGNOSIS — R73.03 PREDIABETES: ICD-10-CM

## 2021-11-09 LAB
ALBUMIN SERPL-MCNC: 4.6 GM/DL (ref 3.4–5)
ALP BLD-CCNC: 61 IU/L (ref 40–128)
ALT SERPL-CCNC: 28 U/L (ref 10–40)
ANION GAP SERPL CALCULATED.3IONS-SCNC: 12 MMOL/L (ref 4–16)
AST SERPL-CCNC: 26 IU/L (ref 15–37)
BASOPHILS ABSOLUTE: 0.1 K/CU MM
BASOPHILS RELATIVE PERCENT: 1.2 % (ref 0–1)
BILIRUB SERPL-MCNC: 0.5 MG/DL (ref 0–1)
BUN BLDV-MCNC: 11 MG/DL (ref 6–23)
CALCIUM SERPL-MCNC: 9.3 MG/DL (ref 8.3–10.6)
CHLORIDE BLD-SCNC: 100 MMOL/L (ref 99–110)
CHOLESTEROL: 144 MG/DL
CO2: 27 MMOL/L (ref 21–32)
CREAT SERPL-MCNC: 0.7 MG/DL (ref 0.6–1.1)
DIFFERENTIAL TYPE: ABNORMAL
EOSINOPHILS ABSOLUTE: 0.1 K/CU MM
EOSINOPHILS RELATIVE PERCENT: 3.5 % (ref 0–3)
ESTIMATED AVERAGE GLUCOSE: 134 MG/DL
GFR AFRICAN AMERICAN: >60 ML/MIN/1.73M2
GFR NON-AFRICAN AMERICAN: >60 ML/MIN/1.73M2
GLUCOSE BLD-MCNC: 107 MG/DL (ref 70–99)
HBA1C MFR BLD: 6.3 % (ref 4.2–6.3)
HCT VFR BLD CALC: 40.2 % (ref 37–47)
HDLC SERPL-MCNC: 55 MG/DL
HEMOGLOBIN: 13 GM/DL (ref 12.5–16)
IMMATURE NEUTROPHIL %: 0.7 % (ref 0–0.43)
LDL CHOLESTEROL DIRECT: 74 MG/DL
LYMPHOCYTES ABSOLUTE: 1.3 K/CU MM
LYMPHOCYTES RELATIVE PERCENT: 32.2 % (ref 24–44)
MCH RBC QN AUTO: 30.4 PG (ref 27–31)
MCHC RBC AUTO-ENTMCNC: 32.3 % (ref 32–36)
MCV RBC AUTO: 93.9 FL (ref 78–100)
MONOCYTES ABSOLUTE: 0.9 K/CU MM
MONOCYTES RELATIVE PERCENT: 21.4 % (ref 0–4)
NUCLEATED RBC %: 0 %
PDW BLD-RTO: 13.3 % (ref 11.7–14.9)
PLATELET # BLD: 261 K/CU MM (ref 140–440)
PMV BLD AUTO: 8.9 FL (ref 7.5–11.1)
POTASSIUM SERPL-SCNC: 4 MMOL/L (ref 3.5–5.1)
RBC # BLD: 4.28 M/CU MM (ref 4.2–5.4)
SEGMENTED NEUTROPHILS ABSOLUTE COUNT: 1.6 K/CU MM
SEGMENTED NEUTROPHILS RELATIVE PERCENT: 41 % (ref 36–66)
SODIUM BLD-SCNC: 139 MMOL/L (ref 135–145)
TOTAL IMMATURE NEUTOROPHIL: 0.03 K/CU MM
TOTAL NUCLEATED RBC: 0 K/CU MM
TOTAL PROTEIN: 7.1 GM/DL (ref 6.4–8.2)
TRIGL SERPL-MCNC: 136 MG/DL
TSH HIGH SENSITIVITY: 1.12 UIU/ML (ref 0.27–4.2)
WBC # BLD: 4 K/CU MM (ref 4–10.5)

## 2021-11-09 PROCEDURE — 80053 COMPREHEN METABOLIC PANEL: CPT

## 2021-11-09 PROCEDURE — 80061 LIPID PANEL: CPT

## 2021-11-09 PROCEDURE — 83036 HEMOGLOBIN GLYCOSYLATED A1C: CPT

## 2021-11-09 PROCEDURE — 85025 COMPLETE CBC W/AUTO DIFF WBC: CPT

## 2021-11-09 PROCEDURE — 84443 ASSAY THYROID STIM HORMONE: CPT

## 2021-11-09 PROCEDURE — 36415 COLL VENOUS BLD VENIPUNCTURE: CPT

## 2021-11-09 PROCEDURE — 83721 ASSAY OF BLOOD LIPOPROTEIN: CPT

## 2021-11-15 ENCOUNTER — OFFICE VISIT (OUTPATIENT)
Dept: INTERNAL MEDICINE CLINIC | Age: 71
End: 2021-11-15
Payer: MEDICARE

## 2021-11-15 VITALS
HEART RATE: 88 BPM | SYSTOLIC BLOOD PRESSURE: 136 MMHG | HEIGHT: 63 IN | OXYGEN SATURATION: 95 % | BODY MASS INDEX: 39.51 KG/M2 | DIASTOLIC BLOOD PRESSURE: 82 MMHG | WEIGHT: 223 LBS | TEMPERATURE: 97.1 F

## 2021-11-15 DIAGNOSIS — R73.03 PREDIABETES: ICD-10-CM

## 2021-11-15 DIAGNOSIS — E03.9 HYPOTHYROIDISM, UNSPECIFIED TYPE: Primary | ICD-10-CM

## 2021-11-15 DIAGNOSIS — E78.49 OTHER HYPERLIPIDEMIA: ICD-10-CM

## 2021-11-15 DIAGNOSIS — I10 ESSENTIAL HYPERTENSION: ICD-10-CM

## 2021-11-15 PROCEDURE — G8417 CALC BMI ABV UP PARAM F/U: HCPCS | Performed by: FAMILY MEDICINE

## 2021-11-15 PROCEDURE — 4040F PNEUMOC VAC/ADMIN/RCVD: CPT | Performed by: FAMILY MEDICINE

## 2021-11-15 PROCEDURE — 1036F TOBACCO NON-USER: CPT | Performed by: FAMILY MEDICINE

## 2021-11-15 PROCEDURE — 1090F PRES/ABSN URINE INCON ASSESS: CPT | Performed by: FAMILY MEDICINE

## 2021-11-15 PROCEDURE — G8427 DOCREV CUR MEDS BY ELIG CLIN: HCPCS | Performed by: FAMILY MEDICINE

## 2021-11-15 PROCEDURE — 1123F ACP DISCUSS/DSCN MKR DOCD: CPT | Performed by: FAMILY MEDICINE

## 2021-11-15 PROCEDURE — 3017F COLORECTAL CA SCREEN DOC REV: CPT | Performed by: FAMILY MEDICINE

## 2021-11-15 PROCEDURE — G8484 FLU IMMUNIZE NO ADMIN: HCPCS | Performed by: FAMILY MEDICINE

## 2021-11-15 PROCEDURE — G8399 PT W/DXA RESULTS DOCUMENT: HCPCS | Performed by: FAMILY MEDICINE

## 2021-11-15 PROCEDURE — 99213 OFFICE O/P EST LOW 20 MIN: CPT | Performed by: FAMILY MEDICINE

## 2021-11-15 ASSESSMENT — ENCOUNTER SYMPTOMS
DIARRHEA: 0
ABDOMINAL PAIN: 0
VOMITING: 0
SORE THROAT: 0
COLOR CHANGE: 0
SHORTNESS OF BREATH: 0
CONSTIPATION: 0
CHEST TIGHTNESS: 0

## 2021-11-15 NOTE — PROGRESS NOTES
Subjective:      Chief Complaint   Patient presents with    6 Month Follow-Up       HPI:  Andreas Hodgkin is a 66 y.o. female who presents today for follow up of chronic conditions as listed below. Hypothyroidism:  Denying any symptoms. Prediabetes:  States she has cut out orange juice and daily chocolate, but is still having ice cream every day. States she just retired a few weeks ago and set up a space in her home to start exercising. Goal is to exercise 3 times a week. Feeling well today, no acute concerns. Labs reviewed. Past Medical History:   Diagnosis Date    Anxiety and depression     Asthma     Colitis, ulcerative ([address]     Jordin Omer    Hyperlipidemia     Hypertension     Hypothyroidism     Mild aortic stenosis 2018, echo    Obesity, Class III, BMI 40-49.9 (morbid obesity) ([address]     Osteopenia     Prediabetes     Rib fracture 2014    10 th right side from syncope    Ulcerative colitis with rectal bleeding ([address] 2021        Past Surgical History:   Procedure Laterality Date    BREAST BIOPSY Left     twice; [place]    CARPAL TUNNEL RELEASE Right      SECTION      one    HYSTERECTOMY      MARY BETH AND BSO      TONSILLECTOMY AND ADENOIDECTOMY      age 8   Geena Safer WRIST GANGLION EXCISION Left        Social History     Tobacco Use    Smoking status: Never Smoker    Smokeless tobacco: Never Used   Substance Use Topics    Alcohol use: Yes     Comment: every 2-3 months        Review of Systems   Constitutional: Negative for activity change, appetite change, chills, fever and unexpected weight change. HENT: Negative for congestion and sore throat. Respiratory: Negative for chest tightness and shortness of breath. Cardiovascular: Negative for chest pain and palpitations. Gastrointestinal: Negative for abdominal pain, constipation, diarrhea and vomiting. Genitourinary: Negative for dysuria. Skin: Negative for color change. Neurological: Negative for dizziness and light-headedness. Psychiatric/Behavioral: Negative for dysphoric mood. The patient is not nervous/anxious. Prior to Visit Medications    Medication Sig Taking? Authorizing Provider   levothyroxine (SYNTHROID) 112 MCG tablet TAKE 1 TABLET BY MOUTH ONCE DAILY Yes Negar Mario MD   FLUoxetine (PROZAC) 20 MG capsule TAKE ONE (1) CAPSULE BY MOUTH ONCE DAILY Yes Negar Mario MD   fluticasone (FLONASE) 50 MCG/ACT nasal spray 2 sprays by Nasal route daily Yes Negar Mario MD   atorvastatin (LIPITOR) 20 MG tablet Take 1 tablet by mouth daily Yes Negar Mario MD   UNABLE TO FIND CPAP supplies Yes Negar Mario MD   hydroCHLOROthiazide (MICROZIDE) 12.5 MG capsule Take 1 capsule by mouth every morning Yes Negar Mario MD   losartan (COZAAR) 25 MG tablet Take 1 tablet by mouth nightly Yes Negar Mario MD   fluocinonide (LIDEX) 0.05 % cream Apply topically 2 times daily. Yes Amanda Knott MD   Mesalamine (LIALDA PO) Take 800 mg by mouth Taking 3 tablets daily Yes Historical Provider, MD   minoxidil (MINOXIDIL FOR WOMEN) 2 % external solution Apply  topically 2 times daily. Apply topically 2 times daily. Yes Historical Provider, MD   lactobacillus rhamnosus, GG, (CULTURELLE) capsule Take 1 capsule by mouth 2 times daily (with meals). Yes Perri Vasquez MD   calcium-vitamin D (OSCAL-500) 500-200 MG-UNIT per tablet Take 1 tablet by mouth daily. Yes Historical Provider, MD          Objective:      /82 (Site: Right Upper Arm, Position: Sitting, Cuff Size: Medium Adult)   Pulse 88   Temp 97.1 °F (36.2 °C)   Ht 5' 3\" (1.6 m)   Wt 223 lb (101.2 kg)   SpO2 95%   BMI 39.50 kg/m²      Physical Exam  Vitals and nursing note reviewed. Constitutional:       General: She is not in acute distress. Appearance: Normal appearance. She is not ill-appearing or toxic-appearing.    HENT:      Head: Normocephalic and atraumatic. Right Ear: External ear normal.      Left Ear: External ear normal.      Nose: Nose normal.      Mouth/Throat:      Pharynx: Oropharynx is clear. Eyes:      General: No scleral icterus. Right eye: No discharge. Left eye: No discharge. Extraocular Movements: Extraocular movements intact. Conjunctiva/sclera: Conjunctivae normal.   Cardiovascular:      Rate and Rhythm: Normal rate and regular rhythm. Heart sounds: Normal heart sounds. Pulmonary:      Effort: Pulmonary effort is normal.      Breath sounds: Normal breath sounds. No wheezing or rales. Musculoskeletal:         General: No deformity. Cervical back: Normal range of motion and neck supple. No rigidity. Skin:     General: Skin is warm and dry. Findings: No rash. Neurological:      General: No focal deficit present. Mental Status: She is alert. Mental status is at baseline. Motor: No weakness. Psychiatric:         Mood and Affect: Mood normal.         Behavior: Behavior normal.            Assessment / Plan:      1. Hypothyroidism, unspecified type  Last TSH wnl. Will monitor labs, continue current dose of synthroid. - TSH without Reflex; Future    2. Prediabetes  Slightly worse on most recent labs. Discussed lifestyle modifications, encouraged cutting down further on sweets. Will repeat labs in 4 months. - Comprehensive Metabolic Panel; Future  - Hemoglobin A1C; Future    3. Essential hypertension  Stable, well controlled. Continue current medications. - Comprehensive Metabolic Panel; Future    4. Other hyperlipidemia  Continue lipitor. I have spent 30 minutes on this patient encounter. Patient voiced understanding and agreement with plan. All questions/concerns were addressed, risks/side effects of medications were reviewed. Return precautions and after visit summary were provided. Return in about 4 months (around 3/15/2022). or earlier as needed. Duane Fuentes MD

## 2021-11-27 ENCOUNTER — PATIENT MESSAGE (OUTPATIENT)
Dept: INTERNAL MEDICINE CLINIC | Age: 71
End: 2021-11-27

## 2021-11-29 RX ORDER — LOSARTAN POTASSIUM 25 MG/1
25 TABLET ORAL NIGHTLY
Qty: 90 TABLET | Refills: 3 | Status: SHIPPED | OUTPATIENT
Start: 2021-11-29

## 2021-11-29 NOTE — TELEPHONE ENCOUNTER
From: Alexia Barrera  To: Dr. Kay Rho: 11/27/2021 11:18 AM EST  Subject: Prescription refill request    I need a renewal of my Losartan Potassium oral tablet 25 mg. I have enough to get me through Dec. 11th. Please send to [facility]. Pharmacy.  Thanks, Rony Cali

## 2021-12-10 ENCOUNTER — HOSPITAL ENCOUNTER (OUTPATIENT)
Dept: INFUSION THERAPY | Age: 71
Setting detail: INFUSION SERIES
Discharge: HOME | End: 2021-12-10
Payer: MEDICARE

## 2021-12-10 VITALS
TEMPERATURE: 96.9 F | RESPIRATION RATE: 20 BRPM | OXYGEN SATURATION: 97 % | HEIGHT: 63 IN | HEART RATE: 76 BPM | WEIGHT: 215 LBS | BODY MASS INDEX: 38.09 KG/M2 | DIASTOLIC BLOOD PRESSURE: 45 MMHG | SYSTOLIC BLOOD PRESSURE: 140 MMHG

## 2021-12-10 DIAGNOSIS — K51.911 ULCERATIVE COLITIS WITH RECTAL BLEEDING, UNSPECIFIED LOCATION (HCC): Primary | ICD-10-CM

## 2021-12-10 PROCEDURE — 2580000003 HC RX 258: Performed by: INTERNAL MEDICINE

## 2021-12-10 PROCEDURE — 96365 THER/PROPH/DIAG IV INF INIT: CPT

## 2021-12-10 PROCEDURE — 96366 THER/PROPH/DIAG IV INF ADDON: CPT

## 2021-12-10 PROCEDURE — 99211 OFF/OP EST MAY X REQ PHY/QHP: CPT

## 2021-12-10 PROCEDURE — 6360000002 HC RX W HCPCS: Performed by: INTERNAL MEDICINE

## 2021-12-10 RX ORDER — METHYLPREDNISOLONE SODIUM SUCCINATE 125 MG/2ML
125 INJECTION, POWDER, LYOPHILIZED, FOR SOLUTION INTRAMUSCULAR; INTRAVENOUS ONCE
Status: CANCELLED | OUTPATIENT
Start: 2021-12-17 | End: 2021-12-17

## 2021-12-10 RX ORDER — EPINEPHRINE 1 MG/ML
0.3 INJECTION, SOLUTION, CONCENTRATE INTRAVENOUS PRN
Status: CANCELLED | OUTPATIENT
Start: 2021-12-17

## 2021-12-10 RX ORDER — SODIUM CHLORIDE 9 MG/ML
INJECTION, SOLUTION INTRAVENOUS CONTINUOUS
Status: CANCELLED | OUTPATIENT
Start: 2021-12-17

## 2021-12-10 RX ORDER — SODIUM CHLORIDE 0.9 % (FLUSH) 0.9 %
10 SYRINGE (ML) INJECTION PRN
Status: CANCELLED | OUTPATIENT
Start: 2021-12-17

## 2021-12-10 RX ORDER — DIPHENHYDRAMINE HYDROCHLORIDE 50 MG/ML
50 INJECTION INTRAMUSCULAR; INTRAVENOUS ONCE
Status: CANCELLED | OUTPATIENT
Start: 2021-12-17 | End: 2021-12-17

## 2021-12-10 RX ORDER — SODIUM CHLORIDE 0.9 % (FLUSH) 0.9 %
10 SYRINGE (ML) INJECTION PRN
Status: DISCONTINUED | OUTPATIENT
Start: 2021-12-10 | End: 2021-12-11 | Stop reason: HOSPADM

## 2021-12-10 RX ADMIN — INFLIXIMAB 500 MG: 100 INJECTION, POWDER, LYOPHILIZED, FOR SOLUTION INTRAVENOUS at 09:04

## 2021-12-10 RX ADMIN — SODIUM CHLORIDE, PRESERVATIVE FREE 10 ML: 5 INJECTION INTRAVENOUS at 08:39

## 2022-01-01 ENCOUNTER — PATIENT MESSAGE (OUTPATIENT)
Dept: INTERNAL MEDICINE CLINIC | Age: 72
End: 2022-01-01

## 2022-01-03 RX ORDER — HYDROCHLOROTHIAZIDE 12.5 MG/1
12.5 CAPSULE, GELATIN COATED ORAL EVERY MORNING
Qty: 90 CAPSULE | Refills: 3 | Status: SHIPPED | OUTPATIENT
Start: 2022-01-03

## 2022-01-03 NOTE — TELEPHONE ENCOUNTER
From: Dayron Peres  To: Dr. Ashley Stall: 1/1/2022 12:33 PM EST  Subject: Rx Refill    I would like a refill on my hydroCHLOROthiazide 12.5 mg generic for Microzide. Please send to [facility], [phone].     Thanks,  Allen Steele

## 2022-01-15 ENCOUNTER — PATIENT MESSAGE (OUTPATIENT)
Dept: INTERNAL MEDICINE CLINIC | Age: 72
End: 2022-01-15

## 2022-01-17 RX ORDER — FLUOXETINE HYDROCHLORIDE 20 MG/1
CAPSULE ORAL
Qty: 30 CAPSULE | Refills: 5 | Status: SHIPPED | OUTPATIENT
Start: 2022-01-17 | End: 2022-07-05 | Stop reason: SDUPTHER

## 2022-01-17 NOTE — TELEPHONE ENCOUNTER
From: John Paul Melendez  To: Dr. Stack Hastin/15/2022 1:55 PM EST  Subject: Rx Refill    I will run out of Prozac FLUoxitine HCI oral capsule 20 mg on . Please phone in a refill to [facility] before then. Thank you very much.  Theron Wiggins

## 2022-02-02 ENCOUNTER — HOSPITAL ENCOUNTER (OUTPATIENT)
Dept: INFUSION THERAPY | Age: 72
Setting detail: INFUSION SERIES
Discharge: HOME | End: 2022-02-02
Payer: MEDICARE

## 2022-02-02 VITALS
BODY MASS INDEX: 38.62 KG/M2 | TEMPERATURE: 97.3 F | SYSTOLIC BLOOD PRESSURE: 156 MMHG | WEIGHT: 218 LBS | OXYGEN SATURATION: 96 % | DIASTOLIC BLOOD PRESSURE: 49 MMHG | RESPIRATION RATE: 18 BRPM | HEART RATE: 79 BPM

## 2022-02-02 DIAGNOSIS — K51.911 ULCERATIVE COLITIS WITH RECTAL BLEEDING, UNSPECIFIED LOCATION (HCC): Primary | ICD-10-CM

## 2022-02-02 PROCEDURE — 6360000002 HC RX W HCPCS: Performed by: INTERNAL MEDICINE

## 2022-02-02 PROCEDURE — 99211 OFF/OP EST MAY X REQ PHY/QHP: CPT

## 2022-02-02 PROCEDURE — 96366 THER/PROPH/DIAG IV INF ADDON: CPT

## 2022-02-02 PROCEDURE — 96365 THER/PROPH/DIAG IV INF INIT: CPT

## 2022-02-02 PROCEDURE — 2580000003 HC RX 258: Performed by: INTERNAL MEDICINE

## 2022-02-02 RX ORDER — EPINEPHRINE 1 MG/ML
0.3 INJECTION, SOLUTION, CONCENTRATE INTRAVENOUS PRN
Status: CANCELLED | OUTPATIENT
Start: 2022-02-09

## 2022-02-02 RX ORDER — SODIUM CHLORIDE 0.9 % (FLUSH) 0.9 %
10 SYRINGE (ML) INJECTION PRN
Status: DISCONTINUED | OUTPATIENT
Start: 2022-02-02 | End: 2022-02-02

## 2022-02-02 RX ORDER — METHYLPREDNISOLONE SODIUM SUCCINATE 125 MG/2ML
125 INJECTION, POWDER, LYOPHILIZED, FOR SOLUTION INTRAMUSCULAR; INTRAVENOUS ONCE
Status: CANCELLED | OUTPATIENT
Start: 2022-02-09 | End: 2022-02-09

## 2022-02-02 RX ORDER — SODIUM CHLORIDE 9 MG/ML
INJECTION, SOLUTION INTRAVENOUS CONTINUOUS
Status: CANCELLED | OUTPATIENT
Start: 2022-02-09

## 2022-02-02 RX ORDER — SODIUM CHLORIDE 0.9 % (FLUSH) 0.9 %
10 SYRINGE (ML) INJECTION PRN
Status: CANCELLED | OUTPATIENT
Start: 2022-02-09

## 2022-02-02 RX ORDER — DIPHENHYDRAMINE HYDROCHLORIDE 50 MG/ML
50 INJECTION INTRAMUSCULAR; INTRAVENOUS ONCE
Status: CANCELLED | OUTPATIENT
Start: 2022-02-09 | End: 2022-02-09

## 2022-02-02 RX ADMIN — INFLIXIMAB 500 MG: 100 INJECTION, POWDER, LYOPHILIZED, FOR SOLUTION INTRAVENOUS at 09:20

## 2022-02-02 RX ADMIN — SODIUM CHLORIDE, PRESERVATIVE FREE 10 ML: 5 INJECTION INTRAVENOUS at 08:50

## 2022-02-02 RX ADMIN — SODIUM CHLORIDE, PRESERVATIVE FREE 10 ML: 5 INJECTION INTRAVENOUS at 11:27

## 2022-02-03 RX ORDER — ACETAMINOPHEN 325 MG/1
650 TABLET ORAL
Status: CANCELLED | OUTPATIENT
Start: 2022-03-28

## 2022-02-03 RX ORDER — SODIUM CHLORIDE 9 MG/ML
INJECTION, SOLUTION INTRAVENOUS CONTINUOUS
Status: CANCELLED | OUTPATIENT
Start: 2022-03-28

## 2022-02-03 RX ORDER — EPINEPHRINE 1 MG/ML
0.3 INJECTION, SOLUTION, CONCENTRATE INTRAVENOUS PRN
Status: CANCELLED | OUTPATIENT
Start: 2022-03-28

## 2022-02-03 RX ORDER — ALBUTEROL SULFATE 90 UG/1
4 AEROSOL, METERED RESPIRATORY (INHALATION) PRN
Status: CANCELLED | OUTPATIENT
Start: 2022-03-28

## 2022-02-03 RX ORDER — ONDANSETRON 2 MG/ML
8 INJECTION INTRAMUSCULAR; INTRAVENOUS
Status: CANCELLED | OUTPATIENT
Start: 2022-03-28

## 2022-02-03 RX ORDER — SODIUM CHLORIDE 0.9 % (FLUSH) 0.9 %
5-40 SYRINGE (ML) INJECTION PRN
Status: CANCELLED | OUTPATIENT
Start: 2022-03-28

## 2022-02-03 RX ORDER — DIPHENHYDRAMINE HYDROCHLORIDE 50 MG/ML
50 INJECTION INTRAMUSCULAR; INTRAVENOUS
Status: CANCELLED | OUTPATIENT
Start: 2022-03-28

## 2022-02-15 ENCOUNTER — PATIENT MESSAGE (OUTPATIENT)
Dept: INTERNAL MEDICINE CLINIC | Age: 72
End: 2022-02-15

## 2022-02-16 RX ORDER — ATORVASTATIN CALCIUM 20 MG/1
20 TABLET, FILM COATED ORAL DAILY
Qty: 90 TABLET | Refills: 3 | Status: SHIPPED | OUTPATIENT
Start: 2022-02-16

## 2022-02-16 NOTE — TELEPHONE ENCOUNTER
From: Navya Casas  To: Dr. Teixeira Para: 2/15/2022 4:35 PM EST  Subject: Lipitor refill    Please call in a refill for Lipitor to the [facility], please.

## 2022-03-07 ENCOUNTER — HOSPITAL ENCOUNTER (OUTPATIENT)
Age: 72
Discharge: HOME | End: 2022-03-07
Payer: MEDICARE

## 2022-03-07 DIAGNOSIS — E03.9 HYPOTHYROIDISM, UNSPECIFIED TYPE: ICD-10-CM

## 2022-03-07 DIAGNOSIS — I10 ESSENTIAL HYPERTENSION: ICD-10-CM

## 2022-03-07 DIAGNOSIS — R73.03 PREDIABETES: ICD-10-CM

## 2022-03-07 LAB
ALBUMIN SERPL-MCNC: 4.6 GM/DL (ref 3.4–5)
ALP BLD-CCNC: 59 IU/L (ref 40–128)
ALT SERPL-CCNC: 24 U/L (ref 10–40)
ANION GAP SERPL CALCULATED.3IONS-SCNC: 11 MMOL/L (ref 4–16)
AST SERPL-CCNC: 24 IU/L (ref 15–37)
BILIRUB SERPL-MCNC: 0.4 MG/DL (ref 0–1)
BUN BLDV-MCNC: 12 MG/DL (ref 6–23)
CALCIUM SERPL-MCNC: 9.4 MG/DL (ref 8.3–10.6)
CHLORIDE BLD-SCNC: 101 MMOL/L (ref 99–110)
CO2: 27 MMOL/L (ref 21–32)
CREAT SERPL-MCNC: 0.7 MG/DL (ref 0.6–1.1)
ESTIMATED AVERAGE GLUCOSE: 137 MG/DL
GFR AFRICAN AMERICAN: >60 ML/MIN/1.73M2
GFR NON-AFRICAN AMERICAN: >60 ML/MIN/1.73M2
GLUCOSE BLD-MCNC: 109 MG/DL (ref 70–99)
HBA1C MFR BLD: 6.4 % (ref 4.2–6.3)
POTASSIUM SERPL-SCNC: 4.1 MMOL/L (ref 3.5–5.1)
SODIUM BLD-SCNC: 139 MMOL/L (ref 135–145)
TOTAL PROTEIN: 7 GM/DL (ref 6.4–8.2)
TSH HIGH SENSITIVITY: 1.06 UIU/ML (ref 0.27–4.2)

## 2022-03-07 PROCEDURE — 36415 COLL VENOUS BLD VENIPUNCTURE: CPT

## 2022-03-07 PROCEDURE — 84443 ASSAY THYROID STIM HORMONE: CPT

## 2022-03-07 PROCEDURE — 83036 HEMOGLOBIN GLYCOSYLATED A1C: CPT

## 2022-03-07 PROCEDURE — 80053 COMPREHEN METABOLIC PANEL: CPT

## 2022-03-12 ENCOUNTER — PATIENT MESSAGE (OUTPATIENT)
Dept: INTERNAL MEDICINE CLINIC | Age: 72
End: 2022-03-12

## 2022-03-14 RX ORDER — LEVOTHYROXINE SODIUM 112 UG/1
TABLET ORAL
Qty: 90 TABLET | Refills: 1 | Status: SHIPPED | OUTPATIENT
Start: 2022-03-14 | End: 2022-09-10 | Stop reason: SDUPTHER

## 2022-03-14 NOTE — TELEPHONE ENCOUNTER
From: Dmitriy Huizar  To: Dr. Mere Sharp: 3/12/2022 3:51 PM EST  Subject: Refill request    Please send a refill for levothyroxine sodium tablet 112 mcg to [facility]. Thank you.

## 2022-03-15 ENCOUNTER — OFFICE VISIT (OUTPATIENT)
Dept: INTERNAL MEDICINE CLINIC | Age: 72
End: 2022-03-15
Payer: MEDICARE

## 2022-03-15 VITALS
HEART RATE: 81 BPM | HEIGHT: 63 IN | DIASTOLIC BLOOD PRESSURE: 78 MMHG | SYSTOLIC BLOOD PRESSURE: 130 MMHG | OXYGEN SATURATION: 98 % | WEIGHT: 215 LBS | TEMPERATURE: 97.3 F | BODY MASS INDEX: 38.09 KG/M2

## 2022-03-15 DIAGNOSIS — E03.9 HYPOTHYROIDISM, UNSPECIFIED TYPE: ICD-10-CM

## 2022-03-15 DIAGNOSIS — I10 PRIMARY HYPERTENSION: Primary | ICD-10-CM

## 2022-03-15 DIAGNOSIS — R73.03 PREDIABETES: ICD-10-CM

## 2022-03-15 DIAGNOSIS — E78.49 OTHER HYPERLIPIDEMIA: ICD-10-CM

## 2022-03-15 DIAGNOSIS — F32.A ANXIETY AND DEPRESSION: ICD-10-CM

## 2022-03-15 DIAGNOSIS — F41.9 ANXIETY AND DEPRESSION: ICD-10-CM

## 2022-03-15 PROBLEM — F33.42 MAJOR DEPRESSIVE DISORDER, RECURRENT, IN FULL REMISSION (HCC): Status: RESOLVED | Noted: 2020-09-17 | Resolved: 2022-03-15

## 2022-03-15 PROCEDURE — 1090F PRES/ABSN URINE INCON ASSESS: CPT | Performed by: FAMILY MEDICINE

## 2022-03-15 PROCEDURE — G8417 CALC BMI ABV UP PARAM F/U: HCPCS | Performed by: FAMILY MEDICINE

## 2022-03-15 PROCEDURE — 3017F COLORECTAL CA SCREEN DOC REV: CPT | Performed by: FAMILY MEDICINE

## 2022-03-15 PROCEDURE — 4040F PNEUMOC VAC/ADMIN/RCVD: CPT | Performed by: FAMILY MEDICINE

## 2022-03-15 PROCEDURE — 1123F ACP DISCUSS/DSCN MKR DOCD: CPT | Performed by: FAMILY MEDICINE

## 2022-03-15 PROCEDURE — G8427 DOCREV CUR MEDS BY ELIG CLIN: HCPCS | Performed by: FAMILY MEDICINE

## 2022-03-15 PROCEDURE — G8399 PT W/DXA RESULTS DOCUMENT: HCPCS | Performed by: FAMILY MEDICINE

## 2022-03-15 PROCEDURE — 1036F TOBACCO NON-USER: CPT | Performed by: FAMILY MEDICINE

## 2022-03-15 PROCEDURE — 99213 OFFICE O/P EST LOW 20 MIN: CPT | Performed by: FAMILY MEDICINE

## 2022-03-15 PROCEDURE — G8484 FLU IMMUNIZE NO ADMIN: HCPCS | Performed by: FAMILY MEDICINE

## 2022-03-15 ASSESSMENT — PATIENT HEALTH QUESTIONNAIRE - PHQ9
SUM OF ALL RESPONSES TO PHQ QUESTIONS 1-9: 0
SUM OF ALL RESPONSES TO PHQ9 QUESTIONS 1 & 2: 0
8. MOVING OR SPEAKING SO SLOWLY THAT OTHER PEOPLE COULD HAVE NOTICED. OR THE OPPOSITE, BEING SO FIGETY OR RESTLESS THAT YOU HAVE BEEN MOVING AROUND A LOT MORE THAN USUAL: 0
9. THOUGHTS THAT YOU WOULD BE BETTER OFF DEAD, OR OF HURTING YOURSELF: 0
3. TROUBLE FALLING OR STAYING ASLEEP: 0
5. POOR APPETITE OR OVEREATING: 0
1. LITTLE INTEREST OR PLEASURE IN DOING THINGS: 0
7. TROUBLE CONCENTRATING ON THINGS, SUCH AS READING THE NEWSPAPER OR WATCHING TELEVISION: 0
4. FEELING TIRED OR HAVING LITTLE ENERGY: 0
6. FEELING BAD ABOUT YOURSELF - OR THAT YOU ARE A FAILURE OR HAVE LET YOURSELF OR YOUR FAMILY DOWN: 0
SUM OF ALL RESPONSES TO PHQ QUESTIONS 1-9: 0
2. FEELING DOWN, DEPRESSED OR HOPELESS: 0
SUM OF ALL RESPONSES TO PHQ QUESTIONS 1-9: 0
10. IF YOU CHECKED OFF ANY PROBLEMS, HOW DIFFICULT HAVE THESE PROBLEMS MADE IT FOR YOU TO DO YOUR WORK, TAKE CARE OF THINGS AT HOME, OR GET ALONG WITH OTHER PEOPLE: 0
SUM OF ALL RESPONSES TO PHQ QUESTIONS 1-9: 0

## 2022-03-15 ASSESSMENT — ENCOUNTER SYMPTOMS
DIARRHEA: 0
ABDOMINAL PAIN: 0
CHEST TIGHTNESS: 0
COLOR CHANGE: 0
VOMITING: 0
CONSTIPATION: 0
SORE THROAT: 0
SHORTNESS OF BREATH: 0

## 2022-03-15 NOTE — PROGRESS NOTES
Subjective:      Chief Complaint   Patient presents with    Follow-up       HPI:  Joel Kemp is a 66 y.o. female who presents today for follow up of chronic conditions as listed below. Hypothyroidism:  Denying any symptoms. Prediabetes:  States she eats too many sweets and carbs. Tries to walk regularly but has not been getting as much exercise recently. Mood:  Doing well on prozac. HTN:  Occasionally checks BP at home, usually in 130's/70's. Denying any symptoms. Feeling well today, no acute concerns. Labs reviewed. Past Medical History:   Diagnosis Date    Anxiety and depression     Asthma     Colitis, ulcerative ([address]     Jordin Omer    Hyperlipidemia     Hypertension     Hypothyroidism     Mild aortic stenosis 2018, echo    Obesity, Class III, BMI 40-49.9 (morbid obesity) ([address]     Osteopenia 2017    Prediabetes     Rib fracture 2014    8  right side from syncope    Ulcerative colitis with rectal bleeding ([address] 2021        Past Surgical History:   Procedure Laterality Date    BREAST BIOPSY Left     twice; [place]    CARPAL TUNNEL RELEASE Right      SECTION      one    HYSTERECTOMY      MARY BETH AND BSO      TONSILLECTOMY AND ADENOIDECTOMY      age 8   Marni Bran WRIST GANGLION EXCISION Left        Social History     Tobacco Use    Smoking status: Never Smoker    Smokeless tobacco: Never Used   Substance Use Topics    Alcohol use: Yes     Comment: every 2-3 months        Review of Systems   Constitutional: Negative for activity change, appetite change, chills, fever and unexpected weight change. HENT: Negative for congestion and sore throat. Respiratory: Negative for chest tightness and shortness of breath. Cardiovascular: Negative for chest pain and palpitations. Gastrointestinal: Negative for abdominal pain, constipation, diarrhea and vomiting. Genitourinary: Negative for dysuria.    Skin: Negative for color change. Neurological: Negative for dizziness and light-headedness. Psychiatric/Behavioral: Negative for dysphoric mood. The patient is not nervous/anxious. Prior to Visit Medications    Medication Sig Taking? Authorizing Provider   levothyroxine (SYNTHROID) 112 MCG tablet TAKE 1 TABLET BY MOUTH ONCE DAILY Yes Jose Alex MD   atorvastatin (LIPITOR) 20 MG tablet Take 1 tablet by mouth daily Yes Jose Alex MD   FLUoxetine (PROZAC) 20 MG capsule TAKE ONE (1) CAPSULE BY MOUTH ONCE DAILY Yes Jose Alex MD   hydroCHLOROthiazide (MICROZIDE) 12.5 MG capsule Take 1 capsule by mouth every morning Yes Jose Alex MD   losartan (COZAAR) 25 MG tablet Take 1 tablet by mouth nightly Yes Jose Alex MD   fluticasone (FLONASE) 50 MCG/ACT nasal spray 2 sprays by Nasal route daily Yes Jose Alex MD   UNABLE TO FIND CPAP supplies Yes Jose Alex MD   fluocinonide (LIDEX) 0.05 % cream Apply topically 2 times daily. Yes Luis Alberto Rosario MD   Mesalamine (LIALDA PO) Take 800 mg by mouth Taking 3 tablets daily Yes Historical Provider, MD   minoxidil (MINOXIDIL FOR WOMEN) 2 % external solution Apply  topically 2 times daily. Apply topically 2 times daily. Yes Historical Provider, MD   lactobacillus rhamnosus, GG, (CULTURELLE) capsule Take 1 capsule by mouth 2 times daily (with meals). Yes Gini Odom MD   calcium-vitamin D (OSCAL-500) 500-200 MG-UNIT per tablet Take 1 tablet by mouth daily. Yes Historical Provider, MD          Objective:      /78 (Site: Right Upper Arm, Position: Sitting, Cuff Size: Medium Adult)   Pulse 81   Temp 97.3 °F (36.3 °C)   Ht 5' 3\" (1.6 m)   Wt 215 lb (97.5 kg)   SpO2 98%   BMI 38.09 kg/m²      Physical Exam  Vitals and nursing note reviewed. Constitutional:       General: She is not in acute distress. Appearance: Normal appearance. She is not ill-appearing or toxic-appearing.    HENT:      Head: Normocephalic and atraumatic. Right Ear: External ear normal.      Left Ear: External ear normal.      Nose: Nose normal.      Mouth/Throat:      Pharynx: Oropharynx is clear. Eyes:      General: No scleral icterus. Right eye: No discharge. Left eye: No discharge. Extraocular Movements: Extraocular movements intact. Conjunctiva/sclera: Conjunctivae normal.   Cardiovascular:      Rate and Rhythm: Normal rate and regular rhythm. Heart sounds: Normal heart sounds. Pulmonary:      Effort: Pulmonary effort is normal.      Breath sounds: Normal breath sounds. No wheezing or rales. Musculoskeletal:         General: No deformity. Cervical back: Normal range of motion and neck supple. No rigidity. Skin:     General: Skin is warm and dry. Findings: No rash. Neurological:      General: No focal deficit present. Mental Status: She is alert. Mental status is at baseline. Motor: No weakness. Psychiatric:         Mood and Affect: Mood normal.         Behavior: Behavior normal.            Assessment / Plan:      1. Primary hypertension  Stable, well controlled. Continue current medications. - CBC with Auto Differential; Future  - Comprehensive Metabolic Panel; Future    2. Anxiety and depression  Well controlled with prozac, continue. 3. Prediabetes  Continuing to trend up. Discussed lifestyle modifications, will repeat labs in 4 months.  - Hemoglobin A1C; Future    4. Other hyperlipidemia  Continue Lipitor.  - Lipid Panel; Future    5. Hypothyroidism, unspecified type  Last TSH wnl. Will monitor labs, continue current dose of synthroid. - TSH; Future          I have spent 28 minutes on this patient encounter. Patient voiced understanding and agreement with plan. All questions/concerns were addressed, risks/side effects of medications were reviewed. Return precautions and after visit summary were provided. Return in about 4 months (around 7/15/2022).  or earlier as needed.       Richard Noe MD

## 2022-03-28 ENCOUNTER — HOSPITAL ENCOUNTER (OUTPATIENT)
Dept: INFUSION THERAPY | Age: 72
Setting detail: INFUSION SERIES
Discharge: HOME | End: 2022-03-28
Payer: MEDICARE

## 2022-03-28 VITALS
BODY MASS INDEX: 37.92 KG/M2 | SYSTOLIC BLOOD PRESSURE: 145 MMHG | TEMPERATURE: 97.3 F | RESPIRATION RATE: 16 BRPM | OXYGEN SATURATION: 97 % | HEART RATE: 72 BPM | HEIGHT: 63 IN | WEIGHT: 214 LBS | DIASTOLIC BLOOD PRESSURE: 62 MMHG

## 2022-03-28 DIAGNOSIS — K51.911 ULCERATIVE COLITIS WITH RECTAL BLEEDING, UNSPECIFIED LOCATION (HCC): Primary | ICD-10-CM

## 2022-03-28 PROCEDURE — 96365 THER/PROPH/DIAG IV INF INIT: CPT

## 2022-03-28 PROCEDURE — 2580000003 HC RX 258: Performed by: INTERNAL MEDICINE

## 2022-03-28 PROCEDURE — 99211 OFF/OP EST MAY X REQ PHY/QHP: CPT

## 2022-03-28 PROCEDURE — 96366 THER/PROPH/DIAG IV INF ADDON: CPT

## 2022-03-28 PROCEDURE — 6360000002 HC RX W HCPCS: Performed by: INTERNAL MEDICINE

## 2022-03-28 RX ORDER — EPINEPHRINE 1 MG/ML
0.3 INJECTION, SOLUTION, CONCENTRATE INTRAVENOUS PRN
Status: CANCELLED | OUTPATIENT
Start: 2022-05-23

## 2022-03-28 RX ORDER — ACETAMINOPHEN 325 MG/1
650 TABLET ORAL
Status: CANCELLED | OUTPATIENT
Start: 2022-05-23

## 2022-03-28 RX ORDER — ONDANSETRON 2 MG/ML
8 INJECTION INTRAMUSCULAR; INTRAVENOUS
Status: CANCELLED | OUTPATIENT
Start: 2022-05-23

## 2022-03-28 RX ORDER — DIPHENHYDRAMINE HYDROCHLORIDE 50 MG/ML
50 INJECTION INTRAMUSCULAR; INTRAVENOUS
Status: CANCELLED | OUTPATIENT
Start: 2022-05-23

## 2022-03-28 RX ORDER — SODIUM CHLORIDE 0.9 % (FLUSH) 0.9 %
5-40 SYRINGE (ML) INJECTION PRN
Status: CANCELLED | OUTPATIENT
Start: 2022-05-23

## 2022-03-28 RX ORDER — SODIUM CHLORIDE 0.9 % (FLUSH) 0.9 %
5-40 SYRINGE (ML) INJECTION PRN
Status: DISCONTINUED | OUTPATIENT
Start: 2022-03-28 | End: 2022-03-29 | Stop reason: HOSPADM

## 2022-03-28 RX ORDER — ALBUTEROL SULFATE 90 UG/1
4 AEROSOL, METERED RESPIRATORY (INHALATION) PRN
Status: CANCELLED | OUTPATIENT
Start: 2022-05-23

## 2022-03-28 RX ORDER — SODIUM CHLORIDE 9 MG/ML
INJECTION, SOLUTION INTRAVENOUS CONTINUOUS
Status: CANCELLED | OUTPATIENT
Start: 2022-05-23

## 2022-03-28 RX ADMIN — SODIUM CHLORIDE, PRESERVATIVE FREE 10 ML: 5 INJECTION INTRAVENOUS at 08:45

## 2022-03-28 RX ADMIN — SODIUM CHLORIDE, PRESERVATIVE FREE 10 ML: 5 INJECTION INTRAVENOUS at 10:50

## 2022-03-28 RX ADMIN — INFLIXIMAB 490 MG: 100 INJECTION, POWDER, LYOPHILIZED, FOR SOLUTION INTRAVENOUS at 08:51

## 2022-03-28 NOTE — DISCHARGE SUMMARY
Tolerated infusion well. Reviewed discharge instructions, understanding verbalized. Copies of AVS given to take home. Patient discharged home. Down to exit per self.     Orders Placed This Encounter   Medications    inFLIXimab (RENFLEXIS) 490 mg in sodium chloride 0.9 % 250 mL IVPB    sodium chloride flush 0.9 % injection 5-40 mL

## 2022-04-17 ENCOUNTER — HOSPITAL ENCOUNTER (EMERGENCY)
Age: 72
Discharge: HOME | End: 2022-04-17
Attending: STUDENT IN AN ORGANIZED HEALTH CARE EDUCATION/TRAINING PROGRAM
Payer: MEDICARE

## 2022-04-17 ENCOUNTER — APPOINTMENT (OUTPATIENT)
Dept: GENERAL RADIOLOGY | Age: 72
End: 2022-04-17
Payer: MEDICARE

## 2022-04-17 ENCOUNTER — APPOINTMENT (OUTPATIENT)
Dept: CT IMAGING | Age: 72
End: 2022-04-17
Payer: MEDICARE

## 2022-04-17 VITALS
TEMPERATURE: 98.1 F | RESPIRATION RATE: 18 BRPM | SYSTOLIC BLOOD PRESSURE: 171 MMHG | HEART RATE: 74 BPM | OXYGEN SATURATION: 97 % | DIASTOLIC BLOOD PRESSURE: 72 MMHG
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
## 2022-04-17 DIAGNOSIS — S20.219A CONTUSION OF CHEST WALL, UNSPECIFIED LATERALITY, INITIAL ENCOUNTER: ICD-10-CM

## 2022-04-17 LAB
ALBUMIN SERPL-MCNC: 4.6 GM/DL (ref 3.4–5)
ALP BLD-CCNC: 62 IU/L (ref 40–129)
ALT SERPL-CCNC: 24 U/L (ref 10–40)
ANION GAP SERPL CALCULATED.3IONS-SCNC: 12 MMOL/L (ref 4–16)
AST SERPL-CCNC: 25 IU/L (ref 15–37)
BASOPHILS ABSOLUTE: 0.1 K/CU MM
BASOPHILS RELATIVE PERCENT: 1.1 % (ref 0–1)
BILIRUB SERPL-MCNC: 0.5 MG/DL (ref 0–1)
BUN BLDV-MCNC: 15 MG/DL (ref 6–23)
CALCIUM SERPL-MCNC: 9.7 MG/DL (ref 8.3–10.6)
CHLORIDE BLD-SCNC: 101 MMOL/L (ref 99–110)
CO2: 26 MMOL/L (ref 21–32)
CREAT SERPL-MCNC: 0.7 MG/DL (ref 0.6–1.1)
DIFFERENTIAL TYPE: ABNORMAL
EOSINOPHILS ABSOLUTE: 0.1 K/CU MM
EOSINOPHILS RELATIVE PERCENT: 2.8 % (ref 0–3)
GFR AFRICAN AMERICAN: >60 ML/MIN/1.73M2
GFR NON-AFRICAN AMERICAN: >60 ML/MIN/1.73M2
GLUCOSE BLD-MCNC: 101 MG/DL (ref 70–99)
HCT VFR BLD CALC: 41.6 % (ref 37–47)
HEMOGLOBIN: 13.3 GM/DL (ref 12.5–16)
IMMATURE NEUTROPHIL %: 0.4 % (ref 0–0.43)
LYMPHOCYTES ABSOLUTE: 1.2 K/CU MM
LYMPHOCYTES RELATIVE PERCENT: 25.4 % (ref 24–44)
MCH RBC QN AUTO: 30.4 PG (ref 27–31)
MCHC RBC AUTO-ENTMCNC: 32 % (ref 32–36)
MCV RBC AUTO: 95 FL (ref 78–100)
MONOCYTES ABSOLUTE: 0.9 K/CU MM
MONOCYTES RELATIVE PERCENT: 19.2 % (ref 0–4)
NUCLEATED RBC %: 0 %
PDW BLD-RTO: 13.1 % (ref 11.7–14.9)
PLATELET # BLD: 258 K/CU MM (ref 140–440)
PMV BLD AUTO: 9 FL (ref 7.5–11.1)
POTASSIUM SERPL-SCNC: 4.2 MMOL/L (ref 3.5–5.1)
RBC # BLD: 4.38 M/CU MM (ref 4.2–5.4)
SEGMENTED NEUTROPHILS ABSOLUTE COUNT: 2.4 K/CU MM
SEGMENTED NEUTROPHILS RELATIVE PERCENT: 51.1 % (ref 36–66)
SODIUM BLD-SCNC: 139 MMOL/L (ref 135–145)
TOTAL IMMATURE NEUTOROPHIL: 0.02 K/CU MM
TOTAL NUCLEATED RBC: 0 K/CU MM
TOTAL PROTEIN: 7.5 GM/DL (ref 6.4–8.2)
TROPONIN T: <0.01 NG/ML
WBC # BLD: 4.7 K/CU MM (ref 4–10.5)

## 2022-04-17 PROCEDURE — 93005 ELECTROCARDIOGRAM TRACING: CPT | Performed by: STUDENT IN AN ORGANIZED HEALTH CARE EDUCATION/TRAINING PROGRAM

## 2022-04-17 PROCEDURE — 72125 CT NECK SPINE W/O DYE: CPT

## 2022-04-17 PROCEDURE — 71046 X-RAY EXAM CHEST 2 VIEWS: CPT

## 2022-04-17 PROCEDURE — 84484 ASSAY OF TROPONIN QUANT: CPT

## 2022-04-17 PROCEDURE — 99284 EMERGENCY DEPT VISIT MOD MDM: CPT

## 2022-04-17 PROCEDURE — 70450 CT HEAD/BRAIN W/O DYE: CPT

## 2022-04-17 PROCEDURE — 80053 COMPREHEN METABOLIC PANEL: CPT

## 2022-04-17 PROCEDURE — 85025 COMPLETE CBC W/AUTO DIFF WBC: CPT

## 2022-04-17 ASSESSMENT — ENCOUNTER SYMPTOMS
EYE PAIN: 0
NAUSEA: 0
VOMITING: 0
COUGH: 0
SHORTNESS OF BREATH: 0
SORE THROAT: 0
ABDOMINAL PAIN: 0
DIARRHEA: 0
EYE REDNESS: 0
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
## 2022-04-17 NOTE — ED NOTES
Reviewed discharge information. Patient verbalized understanding of paperwork, medication, and care instructions. Patient denied any questions.      Michael House RN  04/17/22 1248

## 2022-04-19 LAB
EKG ATRIAL RATE: 72 BPM
EKG DIAGNOSIS: NORMAL
EKG P AXIS: 38 DEGREES
EKG P-R INTERVAL: 184 MS
EKG Q-T INTERVAL: 394 MS
EKG QRS DURATION: 92 MS
EKG QTC CALCULATION (BAZETT): 431 MS
EKG R AXIS: 63 DEGREES
EKG T AXIS: 73 DEGREES
EKG VENTRICULAR RATE: 72 BPM

## 2022-04-19 PROCEDURE — 93010 ELECTROCARDIOGRAM REPORT: CPT | Performed by: INTERNAL MEDICINE

## 2022-05-19 ENCOUNTER — HOSPITAL ENCOUNTER (OUTPATIENT)
Dept: INFUSION THERAPY | Age: 72
Setting detail: INFUSION SERIES
Discharge: HOME | End: 2022-05-19
Payer: MEDICARE

## 2022-05-19 VITALS
SYSTOLIC BLOOD PRESSURE: 155 MMHG | BODY MASS INDEX: 37.56 KG/M2 | RESPIRATION RATE: 18 BRPM | HEART RATE: 75 BPM | OXYGEN SATURATION: 97 % | WEIGHT: 212 LBS | HEIGHT: 63 IN | TEMPERATURE: 97.3 F | DIASTOLIC BLOOD PRESSURE: 52 MMHG

## 2022-05-19 DIAGNOSIS — K51.911 ULCERATIVE COLITIS WITH RECTAL BLEEDING, UNSPECIFIED LOCATION (HCC): Primary | ICD-10-CM

## 2022-05-19 PROCEDURE — 6360000002 HC RX W HCPCS: Performed by: INTERNAL MEDICINE

## 2022-05-19 PROCEDURE — 2580000003 HC RX 258: Performed by: INTERNAL MEDICINE

## 2022-05-19 PROCEDURE — 96365 THER/PROPH/DIAG IV INF INIT: CPT

## 2022-05-19 PROCEDURE — 96366 THER/PROPH/DIAG IV INF ADDON: CPT

## 2022-05-19 PROCEDURE — 99211 OFF/OP EST MAY X REQ PHY/QHP: CPT

## 2022-05-19 RX ORDER — SODIUM CHLORIDE 9 MG/ML
INJECTION, SOLUTION INTRAVENOUS CONTINUOUS
Status: CANCELLED | OUTPATIENT
Start: 2022-05-26

## 2022-05-19 RX ORDER — SODIUM CHLORIDE 0.9 % (FLUSH) 0.9 %
5-40 SYRINGE (ML) INJECTION PRN
Status: CANCELLED | OUTPATIENT
Start: 2022-05-26

## 2022-05-19 RX ORDER — ALBUTEROL SULFATE 90 UG/1
4 AEROSOL, METERED RESPIRATORY (INHALATION) PRN
Status: CANCELLED | OUTPATIENT
Start: 2022-05-26

## 2022-05-19 RX ORDER — ACETAMINOPHEN 325 MG/1
650 TABLET ORAL
Status: CANCELLED | OUTPATIENT
Start: 2022-05-26

## 2022-05-19 RX ORDER — SODIUM CHLORIDE 0.9 % (FLUSH) 0.9 %
5-40 SYRINGE (ML) INJECTION PRN
Status: DISCONTINUED | OUTPATIENT
Start: 2022-05-19 | End: 2022-05-20 | Stop reason: HOSPADM

## 2022-05-19 RX ORDER — DIPHENHYDRAMINE HYDROCHLORIDE 50 MG/ML
50 INJECTION INTRAMUSCULAR; INTRAVENOUS
Status: CANCELLED | OUTPATIENT
Start: 2022-05-26

## 2022-05-19 RX ORDER — ONDANSETRON 2 MG/ML
8 INJECTION INTRAMUSCULAR; INTRAVENOUS
Status: CANCELLED | OUTPATIENT
Start: 2022-05-26

## 2022-05-19 RX ORDER — FAMOTIDINE 10 MG/ML
20 INJECTION, SOLUTION INTRAVENOUS ONCE
Status: CANCELLED | OUTPATIENT
Start: 2022-05-26 | End: 2022-05-26

## 2022-05-19 RX ORDER — EPINEPHRINE 1 MG/ML
0.3 INJECTION, SOLUTION, CONCENTRATE INTRAVENOUS PRN
Status: CANCELLED | OUTPATIENT
Start: 2022-05-26

## 2022-05-19 RX ADMIN — INFLIXIMAB 480 MG: 100 INJECTION, POWDER, LYOPHILIZED, FOR SOLUTION INTRAVENOUS at 09:19

## 2022-05-19 RX ADMIN — Medication 10 ML: at 11:44

## 2022-05-19 RX ADMIN — Medication 10 ML: at 09:12

## 2022-05-19 NOTE — PROGRESS NOTES
Pt taken to room 01 for remicade infusion. Pt oriented to room, call light, bed/chair controls, TV, pt voiced understanding. Plan of care explained to pt, pt voiced understanding.

## 2022-07-05 RX ORDER — FLUOXETINE HYDROCHLORIDE 20 MG/1
CAPSULE ORAL
Qty: 30 CAPSULE | Refills: 5 | Status: SHIPPED | OUTPATIENT
Start: 2022-07-05

## 2022-07-07 ENCOUNTER — TELEPHONE (OUTPATIENT)
Dept: INFUSION THERAPY | Age: 72
End: 2022-07-07

## 2022-07-07 RX ORDER — FLUTICASONE PROPIONATE 50 MCG
SPRAY, SUSPENSION (ML) NASAL
Qty: 1 EACH | Refills: 3 | Status: SHIPPED | OUTPATIENT
Start: 2022-07-07

## 2022-07-07 NOTE — TELEPHONE ENCOUNTER
PT CALLED IN STATING SHE IS HAVING A COLONOSCOPY ON THE 14TH OF July 2022. DR. Mónica James ADVISED PT TO MOVE HER INFUSION APPT OUT 1 WEEK. APPT R/S TO July 21ST.

## 2022-07-18 ENCOUNTER — HOSPITAL ENCOUNTER (OUTPATIENT)
Age: 72
Discharge: HOME | End: 2022-07-18
Payer: MEDICARE

## 2022-07-18 DIAGNOSIS — E03.9 HYPOTHYROIDISM, UNSPECIFIED TYPE: ICD-10-CM

## 2022-07-18 DIAGNOSIS — I10 PRIMARY HYPERTENSION: ICD-10-CM

## 2022-07-18 DIAGNOSIS — R73.03 PREDIABETES: ICD-10-CM

## 2022-07-18 DIAGNOSIS — E78.49 OTHER HYPERLIPIDEMIA: ICD-10-CM

## 2022-07-18 LAB
ALBUMIN SERPL-MCNC: 4.7 GM/DL (ref 3.4–5)
ALP BLD-CCNC: 62 IU/L (ref 40–128)
ALT SERPL-CCNC: 24 U/L (ref 10–40)
ANION GAP SERPL CALCULATED.3IONS-SCNC: 8 MMOL/L (ref 4–16)
AST SERPL-CCNC: 23 IU/L (ref 15–37)
BASOPHILS ABSOLUTE: 0.1 K/CU MM
BASOPHILS RELATIVE PERCENT: 1.2 % (ref 0–1)
BILIRUB SERPL-MCNC: 0.5 MG/DL (ref 0–1)
BUN BLDV-MCNC: 10 MG/DL (ref 6–23)
CALCIUM SERPL-MCNC: 9.5 MG/DL (ref 8.3–10.6)
CHLORIDE BLD-SCNC: 102 MMOL/L (ref 99–110)
CHOLESTEROL: 146 MG/DL
CO2: 30 MMOL/L (ref 21–32)
CREAT SERPL-MCNC: 0.7 MG/DL (ref 0.6–1.1)
DIFFERENTIAL TYPE: ABNORMAL
EOSINOPHILS ABSOLUTE: 0.2 K/CU MM
EOSINOPHILS RELATIVE PERCENT: 3.7 % (ref 0–3)
ESTIMATED AVERAGE GLUCOSE: 128 MG/DL
GFR AFRICAN AMERICAN: >60 ML/MIN/1.73M2
GFR NON-AFRICAN AMERICAN: >60 ML/MIN/1.73M2
GLUCOSE BLD-MCNC: 104 MG/DL (ref 70–99)
HBA1C MFR BLD: 6.1 % (ref 4.2–6.3)
HCT VFR BLD CALC: 39.9 % (ref 37–47)
HDLC SERPL-MCNC: 58 MG/DL
HEMOGLOBIN: 13.4 GM/DL (ref 12.5–16)
IMMATURE NEUTROPHIL %: 0.5 % (ref 0–0.43)
LDL CHOLESTEROL CALCULATED: 62 MG/DL
LYMPHOCYTES ABSOLUTE: 1.3 K/CU MM
LYMPHOCYTES RELATIVE PERCENT: 31.4 % (ref 24–44)
MCH RBC QN AUTO: 30.6 PG (ref 27–31)
MCHC RBC AUTO-ENTMCNC: 33.6 % (ref 32–36)
MCV RBC AUTO: 91.1 FL (ref 78–100)
MONOCYTES ABSOLUTE: 0.8 K/CU MM
MONOCYTES RELATIVE PERCENT: 19.5 % (ref 0–4)
NUCLEATED RBC %: 0 %
PDW BLD-RTO: 12.6 % (ref 11.7–14.9)
PLATELET # BLD: 253 K/CU MM (ref 140–440)
PMV BLD AUTO: 9.1 FL (ref 7.5–11.1)
POTASSIUM SERPL-SCNC: 4.1 MMOL/L (ref 3.5–5.1)
RBC # BLD: 4.38 M/CU MM (ref 4.2–5.4)
SEGMENTED NEUTROPHILS ABSOLUTE COUNT: 1.8 K/CU MM
SEGMENTED NEUTROPHILS RELATIVE PERCENT: 43.7 % (ref 36–66)
SODIUM BLD-SCNC: 140 MMOL/L (ref 135–145)
TOTAL IMMATURE NEUTOROPHIL: 0.02 K/CU MM
TOTAL NUCLEATED RBC: 0 K/CU MM
TOTAL PROTEIN: 6.8 GM/DL (ref 6.4–8.2)
TRIGL SERPL-MCNC: 129 MG/DL
TSH HIGH SENSITIVITY: 0.47 UIU/ML (ref 0.27–4.2)
WBC # BLD: 4.1 K/CU MM (ref 4–10.5)

## 2022-07-18 PROCEDURE — 83036 HEMOGLOBIN GLYCOSYLATED A1C: CPT

## 2022-07-18 PROCEDURE — 85025 COMPLETE CBC W/AUTO DIFF WBC: CPT

## 2022-07-18 PROCEDURE — 80053 COMPREHEN METABOLIC PANEL: CPT

## 2022-07-18 PROCEDURE — 80061 LIPID PANEL: CPT

## 2022-07-18 PROCEDURE — 36415 COLL VENOUS BLD VENIPUNCTURE: CPT

## 2022-07-18 PROCEDURE — 84443 ASSAY THYROID STIM HORMONE: CPT

## 2022-07-21 ENCOUNTER — HOSPITAL ENCOUNTER (OUTPATIENT)
Dept: INFUSION THERAPY | Age: 72
Setting detail: INFUSION SERIES
Discharge: HOME | End: 2022-07-21
Payer: MEDICARE

## 2022-07-21 VITALS
HEART RATE: 68 BPM | HEIGHT: 63 IN | DIASTOLIC BLOOD PRESSURE: 75 MMHG | WEIGHT: 208 LBS | SYSTOLIC BLOOD PRESSURE: 184 MMHG | TEMPERATURE: 97.3 F | OXYGEN SATURATION: 97 % | RESPIRATION RATE: 16 BRPM | BODY MASS INDEX: 36.86 KG/M2

## 2022-07-21 DIAGNOSIS — K51.911 ULCERATIVE COLITIS WITH RECTAL BLEEDING, UNSPECIFIED LOCATION (HCC): Primary | ICD-10-CM

## 2022-07-21 PROCEDURE — 2580000003 HC RX 258: Performed by: INTERNAL MEDICINE

## 2022-07-21 PROCEDURE — 96366 THER/PROPH/DIAG IV INF ADDON: CPT

## 2022-07-21 PROCEDURE — 99211 OFF/OP EST MAY X REQ PHY/QHP: CPT

## 2022-07-21 PROCEDURE — 6360000002 HC RX W HCPCS: Performed by: INTERNAL MEDICINE

## 2022-07-21 PROCEDURE — 96365 THER/PROPH/DIAG IV INF INIT: CPT

## 2022-07-21 RX ORDER — ALBUTEROL SULFATE 90 UG/1
4 AEROSOL, METERED RESPIRATORY (INHALATION) PRN
Status: CANCELLED | OUTPATIENT
Start: 2022-09-08

## 2022-07-21 RX ORDER — FAMOTIDINE 10 MG/ML
20 INJECTION, SOLUTION INTRAVENOUS ONCE
Status: CANCELLED | OUTPATIENT
Start: 2022-09-08 | End: 2022-09-08

## 2022-07-21 RX ORDER — ACETAMINOPHEN 325 MG/1
650 TABLET ORAL
Status: CANCELLED | OUTPATIENT
Start: 2022-09-08

## 2022-07-21 RX ORDER — DIPHENHYDRAMINE HYDROCHLORIDE 50 MG/ML
50 INJECTION INTRAMUSCULAR; INTRAVENOUS
Status: CANCELLED | OUTPATIENT
Start: 2022-09-08

## 2022-07-21 RX ORDER — SODIUM CHLORIDE 0.9 % (FLUSH) 0.9 %
5-40 SYRINGE (ML) INJECTION PRN
Status: CANCELLED | OUTPATIENT
Start: 2022-09-08

## 2022-07-21 RX ORDER — EPINEPHRINE 1 MG/ML
0.3 INJECTION, SOLUTION, CONCENTRATE INTRAVENOUS PRN
Status: CANCELLED | OUTPATIENT
Start: 2022-09-08

## 2022-07-21 RX ORDER — ONDANSETRON 2 MG/ML
8 INJECTION INTRAMUSCULAR; INTRAVENOUS
Status: CANCELLED | OUTPATIENT
Start: 2022-09-08

## 2022-07-21 RX ORDER — SODIUM CHLORIDE 0.9 % (FLUSH) 0.9 %
5-40 SYRINGE (ML) INJECTION PRN
Status: DISCONTINUED | OUTPATIENT
Start: 2022-07-21 | End: 2022-07-22 | Stop reason: HOSPADM

## 2022-07-21 RX ORDER — SODIUM CHLORIDE 9 MG/ML
INJECTION, SOLUTION INTRAVENOUS CONTINUOUS
Status: CANCELLED | OUTPATIENT
Start: 2022-09-08

## 2022-07-21 RX ADMIN — SODIUM CHLORIDE, PRESERVATIVE FREE 10 ML: 5 INJECTION INTRAVENOUS at 08:40

## 2022-07-21 RX ADMIN — SODIUM CHLORIDE, PRESERVATIVE FREE 10 ML: 5 INJECTION INTRAVENOUS at 11:33

## 2022-07-21 RX ADMIN — INFLIXIMAB 470 MG: 100 INJECTION, POWDER, LYOPHILIZED, FOR SOLUTION INTRAVENOUS at 09:34

## 2022-07-21 NOTE — PROGRESS NOTES
Tolerated infusioon well. Reviewed discharge instruction, voiced understanding. Copies of AVS given. Pt discharged home. Pt to exit via steady gait.     Orders Placed This Encounter   Medications    inFLIXimab (RENFLEXIS) 470 mg in sodium chloride 0.9 % 250 mL IVPB    sodium chloride flush 0.9 % injection 5-40 mL

## 2022-07-21 NOTE — PLAN OF CARE
Ambulatory to unit room 4  for RENFLEXIS. Reorientated to unit. Procedure and plan of care explained. Questions answered. Understanding verbalized.

## 2022-07-21 NOTE — DISCHARGE INSTRUCTIONS
Continue same medications, diet, and activity. Seek medical help for signs and symptoms of an allergic reaction; hives, chills, rash,chest pain, difficulty breathing, or swelling of face and/or throat. Talk to your Doctor before receiving any vaccinations or starting any new medicines. Thank you for choosing [facility]. It is our pleasure to serve you.         [id number]   [phone]  8AM-5PM

## 2022-07-25 ENCOUNTER — OFFICE VISIT (OUTPATIENT)
Dept: INTERNAL MEDICINE CLINIC | Age: 72
End: 2022-07-25
Payer: MEDICARE

## 2022-07-25 VITALS
HEIGHT: 63 IN | WEIGHT: 213 LBS | HEART RATE: 74 BPM | BODY MASS INDEX: 37.74 KG/M2 | DIASTOLIC BLOOD PRESSURE: 58 MMHG | SYSTOLIC BLOOD PRESSURE: 116 MMHG | OXYGEN SATURATION: 98 %

## 2022-07-25 DIAGNOSIS — E03.9 HYPOTHYROIDISM, UNSPECIFIED TYPE: ICD-10-CM

## 2022-07-25 DIAGNOSIS — R73.03 PREDIABETES: ICD-10-CM

## 2022-07-25 DIAGNOSIS — S49.90XA: ICD-10-CM

## 2022-07-25 DIAGNOSIS — I10 ESSENTIAL HYPERTENSION: Primary | ICD-10-CM

## 2022-07-25 DIAGNOSIS — D84.9 IMMUNOCOMPROMISED (HCC): ICD-10-CM

## 2022-07-25 DIAGNOSIS — E78.49 OTHER HYPERLIPIDEMIA: ICD-10-CM

## 2022-07-25 DIAGNOSIS — Z12.31 ENCOUNTER FOR SCREENING MAMMOGRAM FOR MALIGNANT NEOPLASM OF BREAST: ICD-10-CM

## 2022-07-25 DIAGNOSIS — F33.42 MAJOR DEPRESSIVE DISORDER, RECURRENT, IN FULL REMISSION (HCC): ICD-10-CM

## 2022-07-25 DIAGNOSIS — K51.30 ULCERATIVE RECTOSIGMOIDITIS WITHOUT COMPLICATION (HCC): ICD-10-CM

## 2022-07-25 PROCEDURE — 99214 OFFICE O/P EST MOD 30 MIN: CPT | Performed by: FAMILY MEDICINE

## 2022-07-25 PROCEDURE — 1123F ACP DISCUSS/DSCN MKR DOCD: CPT | Performed by: FAMILY MEDICINE

## 2022-07-25 PROCEDURE — 1036F TOBACCO NON-USER: CPT | Performed by: FAMILY MEDICINE

## 2022-07-25 PROCEDURE — G8427 DOCREV CUR MEDS BY ELIG CLIN: HCPCS | Performed by: FAMILY MEDICINE

## 2022-07-25 PROCEDURE — 1090F PRES/ABSN URINE INCON ASSESS: CPT | Performed by: FAMILY MEDICINE

## 2022-07-25 PROCEDURE — G8399 PT W/DXA RESULTS DOCUMENT: HCPCS | Performed by: FAMILY MEDICINE

## 2022-07-25 PROCEDURE — 3017F COLORECTAL CA SCREEN DOC REV: CPT | Performed by: FAMILY MEDICINE

## 2022-07-25 PROCEDURE — G8417 CALC BMI ABV UP PARAM F/U: HCPCS | Performed by: FAMILY MEDICINE

## 2022-07-25 RX ORDER — LORATADINE 10 MG/1
10 TABLET ORAL DAILY
COMMUNITY

## 2022-07-25 SDOH — ECONOMIC STABILITY: FOOD INSECURITY: WITHIN THE PAST 12 MONTHS, YOU WORRIED THAT YOUR FOOD WOULD RUN OUT BEFORE YOU GOT MONEY TO BUY MORE.: NEVER TRUE

## 2022-07-25 SDOH — ECONOMIC STABILITY: FOOD INSECURITY: WITHIN THE PAST 12 MONTHS, THE FOOD YOU BOUGHT JUST DIDN'T LAST AND YOU DIDN'T HAVE MONEY TO GET MORE.: NEVER TRUE

## 2022-07-25 ASSESSMENT — ENCOUNTER SYMPTOMS
SORE THROAT: 0
COLOR CHANGE: 0
CONSTIPATION: 0
VOMITING: 0
ABDOMINAL PAIN: 0
DIARRHEA: 0
SHORTNESS OF BREATH: 0
CHEST TIGHTNESS: 0

## 2022-07-25 ASSESSMENT — SOCIAL DETERMINANTS OF HEALTH (SDOH): HOW HARD IS IT FOR YOU TO PAY FOR THE VERY BASICS LIKE FOOD, HOUSING, MEDICAL CARE, AND HEATING?: NOT HARD AT ALL

## 2022-07-25 NOTE — PROGRESS NOTES
Subjective:      Chief Complaint   Patient presents with    Hypertension    Hypothyroidism    Follow-up     4 month      Other     Pt has been off Prozac for about 3 weeks and states she is doing fine. HPI:  Joy Howard is a 66 y.o. female who presents today for follow up of chronic conditions as listed below. Went on a trip to [facility] a few months ago, states she walked >10,000 steps a day for a few weeks while she was there and also ate differently there. Lost 6 lbs during her trip. Has been trying to maintain increased exercise since she has been back. Had colonoscopy a few weeks ago, has upcoming follow up with GI. States she had a problem getting her prozac getting filled a few weeks ago, so has been not taking for about 2-3 weeks. States she has not noticed any difference in symptoms yet and is wondering if she can stay off of medication. Has noticed an odd \"sensation\" in her axilla bilaterally over the past week. No pain, itching, no changes in skin. Thinks it might be her deodorant. Feeling well today, no acute concerns. Labs reviewed.         Past Medical History:   Diagnosis Date    Anxiety and depression     Asthma     Colitis, ulcerative ([address]     Jordin Omer    Hyperlipidemia     Hypertension     Hypothyroidism     Mild aortic stenosis 2018, echo    Obesity, Class III, BMI 40-49.9 (morbid obesity) ([address]     Osteopenia 2017    Prediabetes     Rib fracture 2014    8  right side from syncope    Ulcerative colitis with rectal bleeding ([address] 2021        Past Surgical History:   Procedure Laterality Date    BREAST BIOPSY Left     twice; [place]    CARPAL TUNNEL RELEASE Right      SECTION      one    HYSTERECTOMY (CERVIX STATUS UNKNOWN)      MARY BETH AND BSO (CERVIX REMOVED)      TONSILLECTOMY AND ADENOIDECTOMY      age 8    WRIST GANGLION EXCISION Left        Social History     Tobacco Use    Smoking status: Never    Smokeless tobacco: Never   Substance Use Topics    Alcohol use: Yes     Comment: every 2-3 months        Review of Systems   Constitutional:  Negative for activity change, appetite change, chills, fever and unexpected weight change. HENT:  Negative for congestion and sore throat. Respiratory:  Negative for chest tightness and shortness of breath. Cardiovascular:  Negative for chest pain and palpitations. Gastrointestinal:  Negative for abdominal pain, constipation, diarrhea and vomiting. Genitourinary:  Negative for dysuria. Skin:  Negative for color change. Neurological:  Negative for dizziness and light-headedness. Psychiatric/Behavioral:  Negative for dysphoric mood. The patient is not nervous/anxious. Prior to Visit Medications    Medication Sig Taking? Authorizing Provider   loratadine (CLARITIN) 10 MG tablet Take 10 mg by mouth in the morning. Yes Historical Provider, MD   fluticasone (FLONASE) 50 MCG/ACT nasal spray USE 2 SPRAYS IN EACH NOSTRIL DAILY Yes Jose Stanley MD   FLUoxetine (PROZAC) 20 MG capsule TAKE ONE (1) CAPSULE BY MOUTH ONCE DAILY Yes Jose Stanley MD   levothyroxine (SYNTHROID) 112 MCG tablet TAKE 1 TABLET BY MOUTH ONCE DAILY Yes Jose Stanley MD   atorvastatin (LIPITOR) 20 MG tablet Take 1 tablet by mouth daily Yes Jose Stanley MD   hydroCHLOROthiazide (MICROZIDE) 12.5 MG capsule Take 1 capsule by mouth every morning Yes Jose Stanley MD   losartan (COZAAR) 25 MG tablet Take 1 tablet by mouth nightly Yes Jose Stanley MD   UNABLE TO FIND CPAP supplies Yes Jose Stanley MD   fluocinonide (LIDEX) 0.05 % cream Apply topically 2 times daily. Yes Frandy Foster MD   Mesalamine (LIALDA PO) Take 800 mg by mouth Taking 3 tablets daily Yes Historical Provider, MD   calcium-vitamin D (OSCAL-500) 500-200 MG-UNIT per tablet Take 1 tablet by mouth daily.  Yes Historical Provider, MD          Objective:      BP (!) 116/58 (Site: Left Upper Arm, Position: Sitting, Cuff Size: Medium Adult)   Pulse 74   Ht 5' 3\" (1.6 m)   Wt 213 lb (96.6 kg)   SpO2 98%   BMI 37.73 kg/m²      Physical Exam  Vitals and nursing note reviewed. Constitutional:       General: She is not in acute distress. Appearance: Normal appearance. She is not ill-appearing or toxic-appearing. HENT:      Head: Normocephalic and atraumatic. Right Ear: External ear normal.      Left Ear: External ear normal.      Nose: Nose normal.      Mouth/Throat:      Pharynx: Oropharynx is clear. Eyes:      General: No scleral icterus. Right eye: No discharge. Left eye: No discharge. Extraocular Movements: Extraocular movements intact. Conjunctiva/sclera: Conjunctivae normal.   Cardiovascular:      Rate and Rhythm: Normal rate and regular rhythm. Heart sounds: Normal heart sounds. Pulmonary:      Effort: Pulmonary effort is normal.      Breath sounds: Normal breath sounds. No wheezing or rales. Musculoskeletal:         General: No deformity. Cervical back: Normal range of motion and neck supple. No rigidity. Skin:     General: Skin is warm and dry. Findings: No rash. Neurological:      General: No focal deficit present. Mental Status: She is alert. Mental status is at baseline. Motor: No weakness. Psychiatric:         Mood and Affect: Mood normal.         Behavior: Behavior normal.          Assessment / Plan:      1. Essential hypertension  Stable, well controlled. Continue current medications. - CBC with Auto Differential; Future  - Comprehensive Metabolic Panel; Future    2. Encounter for screening mammogram for malignant neoplasm of breast  Overdue.  - DANII DIGITAL SCREEN W CAD BILATERAL PER PROTOCOL; Future    3. Immunocompromised ([address]  2/2 UC, taking mesalamine. 4. Ulcerative rectosigmoiditis without complication ([address]  Being managed by GI, up to date on colonoscopy.      5. Prediabetes  Improving, continue lifestyle modifications. Will monitor.   - Hemoglobin A1C; Future    6. Hypothyroidism, unspecified type  Last TSH wnl. Will monitor labs, continue current dose of synthroid. - TSH; Future    7. Other hyperlipidemia  Continue lipitor.   - Lipid Panel; Future    8. Major depressive disorder, recurrent, in full remission [facility])  Patient has discontinued prozac without recurrence of symptoms so far. Still early- advised to monitor closely, will restart if symptoms recur. 9. Injury of axilla, unspecified laterality, initial encounter  Non specific symptoms of B/L axilla over the past week. Exam unremarkable, no lymphadenopathy. Mammogram ordered. Patient would like to monitor for now, contact clinic for any new/worsening symptoms. I have spent 30 minutes on this patient encounter. Patient voiced understanding and agreement with plan. All questions/concerns were addressed, risks/side effects of medications were reviewed. Return precautions and after visit summary were provided. Return in about 6 months (around 1/25/2023). or earlier as needed.       Reyes Hansen MD

## 2022-08-16 ENCOUNTER — TELEPHONE (OUTPATIENT)
Dept: INTERNAL MEDICINE CLINIC | Age: 72
End: 2022-08-16

## 2022-08-16 NOTE — TELEPHONE ENCOUNTER
Elena from [address] [place] [address] called stating that patient notified them that she should be receiving CPAP supplies. Elena states they have no order and wanted to check on the status of order for the supplies.

## 2022-09-12 RX ORDER — LEVOTHYROXINE SODIUM 112 UG/1
TABLET ORAL
Qty: 90 TABLET | Refills: 1 | Status: SHIPPED | OUTPATIENT
Start: 2022-09-12

## 2022-09-16 ENCOUNTER — HOSPITAL ENCOUNTER (OUTPATIENT)
Dept: INFUSION THERAPY | Age: 72
Setting detail: INFUSION SERIES
Discharge: HOME | End: 2022-09-16
Payer: MEDICARE

## 2022-09-16 VITALS
SYSTOLIC BLOOD PRESSURE: 153 MMHG | TEMPERATURE: 97.5 F | WEIGHT: 212 LBS | BODY MASS INDEX: 37.55 KG/M2 | HEART RATE: 78 BPM | RESPIRATION RATE: 16 BRPM | OXYGEN SATURATION: 97 % | DIASTOLIC BLOOD PRESSURE: 65 MMHG

## 2022-09-16 DIAGNOSIS — K51.911 ULCERATIVE COLITIS WITH RECTAL BLEEDING, UNSPECIFIED LOCATION (HCC): Primary | ICD-10-CM

## 2022-09-16 PROCEDURE — 96365 THER/PROPH/DIAG IV INF INIT: CPT

## 2022-09-16 PROCEDURE — 96366 THER/PROPH/DIAG IV INF ADDON: CPT

## 2022-09-16 PROCEDURE — 2580000003 HC RX 258: Performed by: INTERNAL MEDICINE

## 2022-09-16 PROCEDURE — 6360000002 HC RX W HCPCS: Performed by: INTERNAL MEDICINE

## 2022-09-16 PROCEDURE — 99211 OFF/OP EST MAY X REQ PHY/QHP: CPT

## 2022-09-16 RX ORDER — ONDANSETRON 2 MG/ML
8 INJECTION INTRAMUSCULAR; INTRAVENOUS
OUTPATIENT
Start: 2022-11-11

## 2022-09-16 RX ORDER — SODIUM CHLORIDE 0.9 % (FLUSH) 0.9 %
5-40 SYRINGE (ML) INJECTION PRN
OUTPATIENT
Start: 2022-11-11

## 2022-09-16 RX ORDER — ACETAMINOPHEN 325 MG/1
650 TABLET ORAL
OUTPATIENT
Start: 2022-11-11

## 2022-09-16 RX ORDER — SODIUM CHLORIDE 9 MG/ML
INJECTION, SOLUTION INTRAVENOUS CONTINUOUS
OUTPATIENT
Start: 2022-11-11

## 2022-09-16 RX ORDER — EPINEPHRINE 1 MG/ML
0.3 INJECTION, SOLUTION, CONCENTRATE INTRAVENOUS PRN
OUTPATIENT
Start: 2022-11-11

## 2022-09-16 RX ORDER — DIPHENHYDRAMINE HYDROCHLORIDE 50 MG/ML
50 INJECTION INTRAMUSCULAR; INTRAVENOUS
OUTPATIENT
Start: 2022-11-11

## 2022-09-16 RX ORDER — FAMOTIDINE 10 MG/ML
20 INJECTION, SOLUTION INTRAVENOUS ONCE
OUTPATIENT
Start: 2022-11-11 | End: 2022-11-11

## 2022-09-16 RX ORDER — SODIUM CHLORIDE 0.9 % (FLUSH) 0.9 %
5-40 SYRINGE (ML) INJECTION PRN
Status: DISCONTINUED | OUTPATIENT
Start: 2022-09-16 | End: 2022-09-17 | Stop reason: HOSPADM

## 2022-09-16 RX ORDER — ALBUTEROL SULFATE 90 UG/1
4 AEROSOL, METERED RESPIRATORY (INHALATION) PRN
OUTPATIENT
Start: 2022-11-11

## 2022-09-16 RX ADMIN — INFLIXIMAB 480 MG: 100 INJECTION, POWDER, LYOPHILIZED, FOR SOLUTION INTRAVENOUS at 08:51

## 2022-09-16 RX ADMIN — SODIUM CHLORIDE, PRESERVATIVE FREE 10 ML: 5 INJECTION INTRAVENOUS at 11:02

## 2022-09-16 RX ADMIN — SODIUM CHLORIDE, PRESERVATIVE FREE 10 ML: 5 INJECTION INTRAVENOUS at 08:51

## 2022-09-16 NOTE — PROGRESS NOTES
Tolerated INFUSION well. Reviewed discharge instruction, voiced understanding. Copies of AVS given. Pt discharged HOME. Pt to exit via [facility].     Orders Placed This Encounter   Medications    inFLIXimab (RENFLEXIS) 480 mg in sodium chloride 0.9 % 250 mL IVPB    sodium chloride flush 0.9 % injection 5-40 mL

## 2022-11-11 ENCOUNTER — HOSPITAL ENCOUNTER (OUTPATIENT)
Dept: INFUSION THERAPY | Age: 72
Setting detail: INFUSION SERIES
Discharge: HOME | End: 2022-11-11
Payer: MEDICARE

## 2022-11-11 VITALS
HEART RATE: 83 BPM | SYSTOLIC BLOOD PRESSURE: 146 MMHG | TEMPERATURE: 97.3 F | OXYGEN SATURATION: 97 % | DIASTOLIC BLOOD PRESSURE: 41 MMHG | BODY MASS INDEX: 37.73 KG/M2 | RESPIRATION RATE: 14 BRPM | WEIGHT: 213 LBS

## 2022-11-11 DIAGNOSIS — K51.911 ULCERATIVE COLITIS WITH RECTAL BLEEDING, UNSPECIFIED LOCATION (HCC): Primary | ICD-10-CM

## 2022-11-11 PROCEDURE — 96366 THER/PROPH/DIAG IV INF ADDON: CPT

## 2022-11-11 PROCEDURE — 96365 THER/PROPH/DIAG IV INF INIT: CPT

## 2022-11-11 PROCEDURE — 99211 OFF/OP EST MAY X REQ PHY/QHP: CPT

## 2022-11-11 PROCEDURE — 2580000003 HC RX 258: Performed by: INTERNAL MEDICINE

## 2022-11-11 PROCEDURE — 6360000002 HC RX W HCPCS: Performed by: INTERNAL MEDICINE

## 2022-11-11 RX ORDER — SODIUM CHLORIDE 0.9 % (FLUSH) 0.9 %
5-40 SYRINGE (ML) INJECTION PRN
Status: DISCONTINUED | OUTPATIENT
Start: 2022-11-11 | End: 2022-11-12 | Stop reason: HOSPADM

## 2022-11-11 RX ORDER — FAMOTIDINE 10 MG/ML
20 INJECTION, SOLUTION INTRAVENOUS ONCE
OUTPATIENT
Start: 2023-01-06 | End: 2023-01-06

## 2022-11-11 RX ORDER — SODIUM CHLORIDE 9 MG/ML
INJECTION, SOLUTION INTRAVENOUS CONTINUOUS
OUTPATIENT
Start: 2023-01-06

## 2022-11-11 RX ORDER — DIPHENHYDRAMINE HYDROCHLORIDE 50 MG/ML
50 INJECTION INTRAMUSCULAR; INTRAVENOUS
OUTPATIENT
Start: 2023-01-06

## 2022-11-11 RX ORDER — ACETAMINOPHEN 325 MG/1
650 TABLET ORAL
OUTPATIENT
Start: 2023-01-06

## 2022-11-11 RX ORDER — SODIUM CHLORIDE 0.9 % (FLUSH) 0.9 %
5-40 SYRINGE (ML) INJECTION PRN
OUTPATIENT
Start: 2023-01-06

## 2022-11-11 RX ORDER — ONDANSETRON 2 MG/ML
8 INJECTION INTRAMUSCULAR; INTRAVENOUS
OUTPATIENT
Start: 2023-01-06

## 2022-11-11 RX ORDER — EPINEPHRINE 1 MG/ML
0.3 INJECTION, SOLUTION, CONCENTRATE INTRAVENOUS PRN
OUTPATIENT
Start: 2023-01-06

## 2022-11-11 RX ORDER — ALBUTEROL SULFATE 90 UG/1
4 AEROSOL, METERED RESPIRATORY (INHALATION) PRN
OUTPATIENT
Start: 2023-01-06

## 2022-11-11 RX ADMIN — SODIUM CHLORIDE, PRESERVATIVE FREE 10 ML: 5 INJECTION INTRAVENOUS at 10:40

## 2022-11-11 RX ADMIN — SODIUM CHLORIDE, PRESERVATIVE FREE 10 ML: 5 INJECTION INTRAVENOUS at 08:35

## 2022-11-11 RX ADMIN — INFLIXIMAB 480 MG: 100 INJECTION, POWDER, LYOPHILIZED, FOR SOLUTION INTRAVENOUS at 08:36

## 2022-11-28 RX ORDER — LOSARTAN POTASSIUM 25 MG/1
25 TABLET ORAL NIGHTLY
Qty: 90 TABLET | Refills: 3 | Status: SHIPPED | OUTPATIENT
Start: 2022-11-28

## 2023-01-03 RX ORDER — HYDROCHLOROTHIAZIDE 12.5 MG/1
12.5 CAPSULE, GELATIN COATED ORAL EVERY MORNING
Qty: 90 CAPSULE | Refills: 3 | Status: SHIPPED | OUTPATIENT
Start: 2023-01-03

## 2023-01-04 ENCOUNTER — HOSPITAL ENCOUNTER (OUTPATIENT)
Dept: INFUSION THERAPY | Age: 73
Setting detail: INFUSION SERIES
Discharge: HOME | End: 2023-01-04
Payer: MEDICARE

## 2023-01-04 VITALS
WEIGHT: 212 LBS | HEART RATE: 78 BPM | RESPIRATION RATE: 16 BRPM | BODY MASS INDEX: 37.55 KG/M2 | OXYGEN SATURATION: 97 % | SYSTOLIC BLOOD PRESSURE: 147 MMHG | DIASTOLIC BLOOD PRESSURE: 66 MMHG | TEMPERATURE: 97.3 F

## 2023-01-04 DIAGNOSIS — K51.911 ULCERATIVE COLITIS WITH RECTAL BLEEDING, UNSPECIFIED LOCATION (HCC): Primary | ICD-10-CM

## 2023-01-04 PROCEDURE — 96366 THER/PROPH/DIAG IV INF ADDON: CPT

## 2023-01-04 PROCEDURE — 2580000003 HC RX 258: Performed by: INTERNAL MEDICINE

## 2023-01-04 PROCEDURE — 99211 OFF/OP EST MAY X REQ PHY/QHP: CPT

## 2023-01-04 PROCEDURE — 6360000002 HC RX W HCPCS: Performed by: INTERNAL MEDICINE

## 2023-01-04 PROCEDURE — 96365 THER/PROPH/DIAG IV INF INIT: CPT

## 2023-01-04 RX ORDER — EPINEPHRINE 1 MG/ML
0.3 INJECTION, SOLUTION, CONCENTRATE INTRAVENOUS PRN
Status: CANCELLED | OUTPATIENT
Start: 2023-01-04

## 2023-01-04 RX ORDER — ALBUTEROL SULFATE 90 UG/1
4 AEROSOL, METERED RESPIRATORY (INHALATION) PRN
Status: CANCELLED | OUTPATIENT
Start: 2023-01-04

## 2023-01-04 RX ORDER — DIPHENHYDRAMINE HYDROCHLORIDE 50 MG/ML
50 INJECTION INTRAMUSCULAR; INTRAVENOUS
Status: CANCELLED | OUTPATIENT
Start: 2023-01-04

## 2023-01-04 RX ORDER — SODIUM CHLORIDE 0.9 % (FLUSH) 0.9 %
5-40 SYRINGE (ML) INJECTION PRN
Status: DISCONTINUED | OUTPATIENT
Start: 2023-01-04 | End: 2023-01-05 | Stop reason: HOSPADM

## 2023-01-04 RX ORDER — FAMOTIDINE 10 MG/ML
20 INJECTION, SOLUTION INTRAVENOUS ONCE
Status: CANCELLED | OUTPATIENT
Start: 2023-01-04 | End: 2023-01-04

## 2023-01-04 RX ORDER — ONDANSETRON 2 MG/ML
8 INJECTION INTRAMUSCULAR; INTRAVENOUS
Status: CANCELLED | OUTPATIENT
Start: 2023-01-04

## 2023-01-04 RX ORDER — SODIUM CHLORIDE 9 MG/ML
INJECTION, SOLUTION INTRAVENOUS CONTINUOUS
Status: CANCELLED | OUTPATIENT
Start: 2023-01-04

## 2023-01-04 RX ORDER — SODIUM CHLORIDE 0.9 % (FLUSH) 0.9 %
5-40 SYRINGE (ML) INJECTION PRN
Status: CANCELLED | OUTPATIENT
Start: 2023-01-04

## 2023-01-04 RX ORDER — ACETAMINOPHEN 325 MG/1
650 TABLET ORAL
Status: CANCELLED | OUTPATIENT
Start: 2023-01-04

## 2023-01-04 RX ADMIN — SODIUM CHLORIDE, PRESERVATIVE FREE 10 ML: 5 INJECTION INTRAVENOUS at 08:45

## 2023-01-04 RX ADMIN — SODIUM CHLORIDE, PRESERVATIVE FREE 10 ML: 5 INJECTION INTRAVENOUS at 10:50

## 2023-01-04 RX ADMIN — INFLIXIMAB 480 MG: 100 INJECTION, POWDER, LYOPHILIZED, FOR SOLUTION INTRAVENOUS at 08:51

## 2023-01-04 NOTE — PLAN OF CARE
Ambulatory to unit room 4 for Renflexis. Orientated to unit. Procedure and plan of care explained. Questions answered. Understanding verbalized.

## 2023-01-04 NOTE — DISCHARGE INSTRUCTIONS
Continue same medications, diet, and activity. Seek medical help for signs and symptoms of an allergic reaction; hives, chills, rash,chest pain, difficulty breathing, or swelling of face and/or throat. Talk to your Doctor before receiving any vaccinations or starting any new medicines. Thank you for choosing [facility]. It is our pleasure to serve you.         [id number]   [phone]  8AM-5PM

## 2023-01-04 NOTE — DISCHARGE SUMMARY
Tolerated infusion well. Reviewed discharge instructions, understanding verbalized. Copies of AVS given to take home. Patient discharged home. Down to exit per self.     Orders Placed This Encounter   Medications    inFLIXimab (RENFLEXIS) 480 mg in sodium chloride 0.9 % 250 mL IVPB    sodium chloride flush 0.9 % injection 5-40 mL

## 2023-01-16 ENCOUNTER — HOSPITAL ENCOUNTER (OUTPATIENT)
Age: 73
Discharge: HOME | End: 2023-01-16
Payer: MEDICARE

## 2023-01-16 LAB
ALBUMIN SERPL-MCNC: 4.6 GM/DL (ref 3.4–5)
ALP BLD-CCNC: 58 IU/L (ref 40–128)
ALT SERPL-CCNC: 30 U/L (ref 10–40)
ANION GAP SERPL CALCULATED.3IONS-SCNC: 11 MMOL/L (ref 4–16)
AST SERPL-CCNC: 29 IU/L (ref 15–37)
BANDED NEUTROPHILS ABSOLUTE COUNT: 0.17 K/CU MM
BANDED NEUTROPHILS RELATIVE PERCENT: 6 % (ref 5–11)
BILIRUB SERPL-MCNC: 0.5 MG/DL (ref 0–1)
BUN BLDV-MCNC: 10 MG/DL (ref 6–23)
CALCIUM SERPL-MCNC: 9.1 MG/DL (ref 8.3–10.6)
CHLORIDE BLD-SCNC: 100 MMOL/L (ref 99–110)
CHOLESTEROL: 145 MG/DL
CO2: 30 MMOL/L (ref 21–32)
CREAT SERPL-MCNC: 0.7 MG/DL (ref 0.6–1.1)
DIFFERENTIAL TYPE: ABNORMAL
EOSINOPHILS ABSOLUTE: 0 K/CU MM
EOSINOPHILS RELATIVE PERCENT: 1 % (ref 0–3)
ESTIMATED AVERAGE GLUCOSE: 134 MG/DL
GFR SERPL CREATININE-BSD FRML MDRD: >60 ML/MIN/1.73M2
GLUCOSE BLD-MCNC: 101 MG/DL (ref 70–99)
HBA1C MFR BLD: 6.3 % (ref 4.2–6.3)
HCT VFR BLD CALC: 41.1 % (ref 37–47)
HDLC SERPL-MCNC: 57 MG/DL
HEMOGLOBIN: 13.2 GM/DL (ref 12.5–16)
LDL CHOLESTEROL CALCULATED: 56 MG/DL
LYMPHOCYTES ABSOLUTE: 0.9 K/CU MM
LYMPHOCYTES RELATIVE PERCENT: 30 % (ref 24–44)
MCH RBC QN AUTO: 30.4 PG (ref 27–31)
MCHC RBC AUTO-ENTMCNC: 32.1 % (ref 32–36)
MCV RBC AUTO: 94.7 FL (ref 78–100)
MONOCYTES ABSOLUTE: 1 K/CU MM
MONOCYTES RELATIVE PERCENT: 34 % (ref 0–4)
PDW BLD-RTO: 13.2 % (ref 11.7–14.9)
PLATELET # BLD: 250 K/CU MM (ref 140–440)
PMV BLD AUTO: 9.3 FL (ref 7.5–11.1)
POTASSIUM SERPL-SCNC: 4.1 MMOL/L (ref 3.5–5.1)
RBC # BLD: 4.34 M/CU MM (ref 4.2–5.4)
SEGMENTED NEUTROPHILS ABSOLUTE COUNT: 0.8 K/CU MM
SEGMENTED NEUTROPHILS RELATIVE PERCENT: 29 % (ref 36–66)
SODIUM BLD-SCNC: 141 MMOL/L (ref 135–145)
TOTAL PROTEIN: 6.6 GM/DL (ref 6.4–8.2)
TRIGL SERPL-MCNC: 161 MG/DL
TSH HIGH SENSITIVITY: 0.38 UIU/ML (ref 0.27–4.2)
WBC # BLD: 2.9 K/CU MM (ref 4–10.5)

## 2023-01-16 PROCEDURE — 36415 COLL VENOUS BLD VENIPUNCTURE: CPT

## 2023-01-16 PROCEDURE — 85007 BL SMEAR W/DIFF WBC COUNT: CPT

## 2023-01-16 PROCEDURE — 83036 HEMOGLOBIN GLYCOSYLATED A1C: CPT

## 2023-01-16 PROCEDURE — 80061 LIPID PANEL: CPT

## 2023-01-16 PROCEDURE — 85027 COMPLETE CBC AUTOMATED: CPT

## 2023-01-16 PROCEDURE — 80053 COMPREHEN METABOLIC PANEL: CPT

## 2023-01-16 PROCEDURE — 84443 ASSAY THYROID STIM HORMONE: CPT

## 2023-01-23 ENCOUNTER — OFFICE VISIT (OUTPATIENT)
Dept: INTERNAL MEDICINE CLINIC | Age: 73
End: 2023-01-23
Payer: MEDICARE

## 2023-01-23 VITALS
HEIGHT: 63 IN | BODY MASS INDEX: 37.92 KG/M2 | DIASTOLIC BLOOD PRESSURE: 60 MMHG | HEART RATE: 72 BPM | WEIGHT: 214 LBS | OXYGEN SATURATION: 95 % | SYSTOLIC BLOOD PRESSURE: 132 MMHG

## 2023-01-23 VITALS
OXYGEN SATURATION: 95 % | DIASTOLIC BLOOD PRESSURE: 60 MMHG | HEIGHT: 63 IN | HEART RATE: 72 BPM | WEIGHT: 214 LBS | SYSTOLIC BLOOD PRESSURE: 132 MMHG | BODY MASS INDEX: 37.92 KG/M2 | TEMPERATURE: 97.3 F

## 2023-01-23 DIAGNOSIS — E78.49 OTHER HYPERLIPIDEMIA: ICD-10-CM

## 2023-01-23 DIAGNOSIS — I10 ESSENTIAL HYPERTENSION: Primary | ICD-10-CM

## 2023-01-23 DIAGNOSIS — Z00.00 MEDICARE ANNUAL WELLNESS VISIT, SUBSEQUENT: Primary | ICD-10-CM

## 2023-01-23 DIAGNOSIS — K51.30 ULCERATIVE RECTOSIGMOIDITIS WITHOUT COMPLICATION (HCC): ICD-10-CM

## 2023-01-23 DIAGNOSIS — D84.9 IMMUNOCOMPROMISED (HCC): ICD-10-CM

## 2023-01-23 DIAGNOSIS — F33.42 MAJOR DEPRESSIVE DISORDER, RECURRENT, IN FULL REMISSION (HCC): ICD-10-CM

## 2023-01-23 DIAGNOSIS — I35.0 MILD AORTIC STENOSIS: ICD-10-CM

## 2023-01-23 DIAGNOSIS — R73.03 PREDIABETES: ICD-10-CM

## 2023-01-23 DIAGNOSIS — E03.9 HYPOTHYROIDISM, UNSPECIFIED TYPE: ICD-10-CM

## 2023-01-23 PROCEDURE — G8417 CALC BMI ABV UP PARAM F/U: HCPCS | Performed by: FAMILY MEDICINE

## 2023-01-23 PROCEDURE — 3075F SYST BP GE 130 - 139MM HG: CPT | Performed by: FAMILY MEDICINE

## 2023-01-23 PROCEDURE — G8484 FLU IMMUNIZE NO ADMIN: HCPCS | Performed by: FAMILY MEDICINE

## 2023-01-23 PROCEDURE — G8399 PT W/DXA RESULTS DOCUMENT: HCPCS | Performed by: FAMILY MEDICINE

## 2023-01-23 PROCEDURE — G8427 DOCREV CUR MEDS BY ELIG CLIN: HCPCS | Performed by: FAMILY MEDICINE

## 2023-01-23 PROCEDURE — 99214 OFFICE O/P EST MOD 30 MIN: CPT | Performed by: FAMILY MEDICINE

## 2023-01-23 PROCEDURE — 1090F PRES/ABSN URINE INCON ASSESS: CPT | Performed by: FAMILY MEDICINE

## 2023-01-23 PROCEDURE — G0439 PPPS, SUBSEQ VISIT: HCPCS | Performed by: FAMILY MEDICINE

## 2023-01-23 PROCEDURE — 3017F COLORECTAL CA SCREEN DOC REV: CPT | Performed by: FAMILY MEDICINE

## 2023-01-23 PROCEDURE — 3078F DIAST BP <80 MM HG: CPT | Performed by: FAMILY MEDICINE

## 2023-01-23 PROCEDURE — 1123F ACP DISCUSS/DSCN MKR DOCD: CPT | Performed by: FAMILY MEDICINE

## 2023-01-23 PROCEDURE — 1036F TOBACCO NON-USER: CPT | Performed by: FAMILY MEDICINE

## 2023-01-23 ASSESSMENT — PATIENT HEALTH QUESTIONNAIRE - PHQ9
2. FEELING DOWN, DEPRESSED OR HOPELESS: 0
7. TROUBLE CONCENTRATING ON THINGS, SUCH AS READING THE NEWSPAPER OR WATCHING TELEVISION: 0
8. MOVING OR SPEAKING SO SLOWLY THAT OTHER PEOPLE COULD HAVE NOTICED. OR THE OPPOSITE, BEING SO FIGETY OR RESTLESS THAT YOU HAVE BEEN MOVING AROUND A LOT MORE THAN USUAL: 0
SUM OF ALL RESPONSES TO PHQ9 QUESTIONS 1 & 2: 0
SUM OF ALL RESPONSES TO PHQ9 QUESTIONS 1 & 2: 0
3. TROUBLE FALLING OR STAYING ASLEEP: 0
SUM OF ALL RESPONSES TO PHQ QUESTIONS 1-9: 1
3. TROUBLE FALLING OR STAYING ASLEEP: 0
SUM OF ALL RESPONSES TO PHQ QUESTIONS 1-9: 1
7. TROUBLE CONCENTRATING ON THINGS, SUCH AS READING THE NEWSPAPER OR WATCHING TELEVISION: 0
5. POOR APPETITE OR OVEREATING: 0
1. LITTLE INTEREST OR PLEASURE IN DOING THINGS: 0
4. FEELING TIRED OR HAVING LITTLE ENERGY: 1
SUM OF ALL RESPONSES TO PHQ QUESTIONS 1-9: 1
SUM OF ALL RESPONSES TO PHQ QUESTIONS 1-9: 1
8. MOVING OR SPEAKING SO SLOWLY THAT OTHER PEOPLE COULD HAVE NOTICED. OR THE OPPOSITE, BEING SO FIGETY OR RESTLESS THAT YOU HAVE BEEN MOVING AROUND A LOT MORE THAN USUAL: 0
6. FEELING BAD ABOUT YOURSELF - OR THAT YOU ARE A FAILURE OR HAVE LET YOURSELF OR YOUR FAMILY DOWN: 0
9. THOUGHTS THAT YOU WOULD BE BETTER OFF DEAD, OR OF HURTING YOURSELF: 0
1. LITTLE INTEREST OR PLEASURE IN DOING THINGS: 0
9. THOUGHTS THAT YOU WOULD BE BETTER OFF DEAD, OR OF HURTING YOURSELF: 0
6. FEELING BAD ABOUT YOURSELF - OR THAT YOU ARE A FAILURE OR HAVE LET YOURSELF OR YOUR FAMILY DOWN: 0
4. FEELING TIRED OR HAVING LITTLE ENERGY: 1
10. IF YOU CHECKED OFF ANY PROBLEMS, HOW DIFFICULT HAVE THESE PROBLEMS MADE IT FOR YOU TO DO YOUR WORK, TAKE CARE OF THINGS AT HOME, OR GET ALONG WITH OTHER PEOPLE: 0
2. FEELING DOWN, DEPRESSED OR HOPELESS: 0
SUM OF ALL RESPONSES TO PHQ QUESTIONS 1-9: 1
SUM OF ALL RESPONSES TO PHQ QUESTIONS 1-9: 1
10. IF YOU CHECKED OFF ANY PROBLEMS, HOW DIFFICULT HAVE THESE PROBLEMS MADE IT FOR YOU TO DO YOUR WORK, TAKE CARE OF THINGS AT HOME, OR GET ALONG WITH OTHER PEOPLE: 0

## 2023-01-23 ASSESSMENT — ENCOUNTER SYMPTOMS
CHEST TIGHTNESS: 0
SORE THROAT: 0
COLOR CHANGE: 0
CONSTIPATION: 0
VOMITING: 0
ABDOMINAL PAIN: 0
SHORTNESS OF BREATH: 0
DIARRHEA: 0

## 2023-01-23 ASSESSMENT — LIFESTYLE VARIABLES
HOW OFTEN DO YOU HAVE A DRINK CONTAINING ALCOHOL: MONTHLY OR LESS
HOW MANY STANDARD DRINKS CONTAINING ALCOHOL DO YOU HAVE ON A TYPICAL DAY: 1 OR 2

## 2023-01-23 NOTE — PATIENT INSTRUCTIONS
Personalized Preventive Plan for Damian Obando - 1/23/2023  Medicare offers a range of preventive health benefits. Some of the tests and screenings are paid in full while other may be subject to a deductible, co-insurance, and/or copay. Some of these benefits include a comprehensive review of your medical history including lifestyle, illnesses that may run in your family, and various assessments and screenings as appropriate. After reviewing your medical record and screening and assessments performed today your provider may have ordered immunizations, labs, imaging, and/or referrals for you. A list of these orders (if applicable) as well as your Preventive Care list are included within your After Visit Summary for your review. Other Preventive Recommendations:    A preventive eye exam performed by an eye specialist is recommended every 1-2 years to screen for glaucoma; cataracts, macular degeneration, and other eye disorders. A preventive dental visit is recommended every 6 months. Try to get at least 150 minutes of exercise per week or 10,000 steps per day on a pedometer . Order or download the FREE \"Exercise & Physical Activity: Your Everyday Guide\" from [employer]. Call 9-344.625.4764 or search [employer] online. You need 9740-5683 mg of calcium and 2759-9941 IU of vitamin D per day. It is possible to meet your calcium requirement with diet alone, but a vitamin D supplement is usually necessary to meet this goal.  When exposed to the sun, use a sunscreen that protects against both UVA and UVB radiation with an SPF of 30 or greater. Reapply every 2 to 3 hours or after sweating, drying off with a towel, or swimming. Always wear a seat belt when traveling in a car. Always wear a helmet when riding a bicycle or motorcycle. Heart-Healthy Diet   Sodium, Fat, and Cholesterol Controlled Diet       What Is a Heart Healthy Diet?    A heart-healthy diet is one that limits sodium , certain types of fat , and cholesterol . This type of diet is recommended for:   People with any form of cardiovascular disease (eg, coronary heart disease , peripheral vascular disease , previous heart attack , previous stroke )   People with risk factors for cardiovascular disease, such as high blood pressure , high cholesterol , or diabetes   Anyone who wants to lower their risk of developing cardiovascular disease   Sodium    Sodium is a mineral found in many foods. In general, most people consume much more sodium than they need. Diets high in sodium can increase blood pressure and lead to edema (water retention). On a heart-healthy diet, you should consume no more than 2,300 mg (milligrams) of sodium per dayabout the amount in one teaspoon of table salt. The foods highest in sodium include table salt (about 50% sodium), processed foods, convenience foods, and preserved foods. Cholesterol    Cholesterol is a fat-like, waxy substance in your blood. Our bodies make some cholesterol. It is also found in animal products, with the highest amounts in fatty meat, egg yolks, whole milk, cheese, shellfish, and organ meats. On a heart-healthy diet, you should limit your cholesterol intake to less than 200 mg per day. It is normal and important to have some cholesterol in your bloodstream. But too much cholesterol can cause plaque to build up within your arteries, which can eventually lead to a heart attack or stroke. The two types of cholesterol that are most commonly referred to are:   Low-density lipoprotein (LDL) cholesterol  Also known as bad cholesterol, this is the cholesterol that tends to build up along your arteries. Bad cholesterol levels are increased by eating fats that are saturated or hydrogenated. Optimal level of this cholesterol is less than 100. Over 130 starts to get risky for heart disease.    High-density lipoprotein (HDL) cholesterol  Also known as good cholesterol, this type of cholesterol actually carries cholesterol away from your arteries and may, therefore, help lower your risk of having a heart attack. You want this level to be high (ideally greater than 60). It is a risk to have a level less than 40. You can raise this good cholesterol by eating olive oil, canola oil, avocados, or nuts. Exercise raises this level, too. Fat    Fat is calorie dense and packs a lot of calories into a small amount of food. Even though fats should be limited due to their high calorie content, not all fats are bad. In fact, some fats are quite healthful. Fat can be broken down into four main types. The good-for-you fats are:   Monounsaturated fat  found in oils such as olive and canola, avocados, and nuts and natural nut butters; can decrease cholesterol levels, while keeping levels of HDL cholesterol high   Polyunsaturated fat  found in oils such as safflower, sunflower, soybean, corn, and sesame; can decrease total cholesterol and LDL cholesterol   Omega-3 fatty acids  particularly those found in fatty fish (such as salmon, trout, tuna, mackerel, herring, and sardines); can decrease risk of arrhythmias, decrease triglyceride levels, and slightly lower blood pressure   The fats that you want to limit are:   Saturated fat  found in animal products, many fast foods, and a few vegetables; increases total blood cholesterol, including LDL levels   Animal fats that are saturated include: butter, lard, whole-milk dairy products, meat fat, and poultry skin   Vegetable fats that are saturated include: hydrogenated shortening, palm oil, coconut oil, cocoa butter   Hydrogenated or trans fat  found in margarine and vegetable shortening, most shelf stable snack foods, and fried foods; increases LDL and decreases HDL     It is generally recommended that you limit your total fat for the day to less than 30% of your total calories.  If you follow an 1800-calorie heart healthy diet, for example, this would mean 60 grams of fat or less per day. Saturated fat and trans fat in your diet raises your blood cholesterol the most, much more than dietary cholesterol does. For this reason, on a heart-healthy diet, less than 7% of your calories should come from saturated fat and ideally 0% from trans fat. On an 1800-calorie diet, this translates into less than 14 grams of saturated fat per day, leaving 46 grams of fat to come from mono- and polyunsaturated fats.    Food Choices on a Heart Healthy Diet   Food Category   Foods Recommended   Foods to Avoid   Grains   Breads and rolls without salted tops Most dry and cooked cereals Unsalted crackers and breadsticks Low-sodium or homemade breadcrumbs or stuffing All rice and pastas   Breads, rolls, and crackers with salted tops High-fat baked goods (eg, muffins, donuts, pastries) Quick breads, self-rising flour, and biscuit mixes Regular bread crumbs Instant hot cereals Commercially prepared rice, pasta, or stuffing mixes   Vegetables   Most fresh, frozen, and low-sodium canned vegetables Low-sodium and salt-free vegetable juices Canned vegetables if unsalted or rinsed   Regular canned vegetables and juices, including sauerkraut and pickled vegetables Frozen vegetables with sauces Commercially prepared potato and vegetable mixes   Fruits   Most fresh, frozen, and canned fruits All fruit juices   Fruits processed with salt or sodium   Milk   Nonfat or low-fat (1%) milk Nonfat or low-fat yogurt Cottage cheese, low-fat ricotta, cheeses labeled as low-fat and low-sodium   Whole milk Reduced-fat (2%) milk Malted and chocolate milk Full fat yogurt Most cheeses (unless low-fat and low salt) Buttermilk (no more than 1 cup per week)   Meats and Beans   Lean cuts of fresh or frozen beef, veal, lamb, or pork (look for the word loin) Fresh or frozen poultry without the skin Fresh or frozen fish and some shellfish Egg whites and egg substitutes (Limit whole eggs to three per week) Tofu Nuts or seeds (unsalted, dry-roasted), low-sodium peanut butter Dried peas, beans, and lentils   Any smoked, cured, salted, or canned meat, fish, or poultry (including melton, chipped beef, cold cuts, hot dogs, sausages, sardines, and anchovies) Poultry skins Breaded and/or fried fish or meats Canned peas, beans, and lentils Salted nuts   Fats and Oils   Olive oil and canola oil Low-sodium, low-fat salad dressings and mayonnaise   Butter, margarine, coconut and palm oils, melton fat   Snacks, Sweets, and Condiments   Low-sodium or unsalted versions of broths, soups, soy sauce, and condiments Pepper, herbs, and spices; vinegar, lemon, or lime juice Low-fat frozen desserts (yogurt, sherbet, fruit bars) Sugar, cocoa powder, honey, syrup, jam, and preserves Low-fat, trans-fat free cookies, cakes, and pies Ernst and animal crackers, fig bars, veto snaps   High-fat desserts Broth, soups, gravies, and sauces, made from instant mixes or other high-sodium ingredients Salted snack foods Canned olives Meat tenderizers, seasoning salt, and most flavored vinegars   Beverages   Low-sodium carbonated beverages Tea and coffee in moderation Soy milk   Commercially softened water   Suggestions   Make whole grains, fruits, and vegetables the base of your diet. Choose heart-healthy fats such as canola, olive, and flaxseed oil, and foods high in heart-healthy fats, such as nuts, seeds, soybeans, tofu, and fish. Eat fish at least twice per week; the fish highest in omega-3 fatty acids and lowest in mercury include salmon, herring, mackerel, sardines, and canned chunk light tuna. If you eat fish less than twice per week or have high triglycerides, talk to your doctor about taking fish oil supplements. Read food labels. For products low in fat and cholesterol, look for fat free, low-fat, cholesterol free, saturated fat free, and trans fat freeAlso scan the Nutrition Facts Label, which lists saturated fat, trans fat, and cholesterol amounts. For products low in sodium, look for sodium free, very low sodium, low sodium, no added salt, and unsalted   Skip the salt when cooking or at the table; if food needs more flavor, get creative and try out different herbs and spices. Garlic and onion also add substantial flavor to foods. Trim any visible fat off meat and poultry before cooking, and drain the fat off after santizo. Use cooking methods that require little or no added fat, such as grilling, boiling, baking, poaching, broiling, roasting, steaming, stir-frying, and sauting. Avoid fast food and convenience food. They tend to be high in saturated and trans fat and have a lot of added salt. Talk to a registered dietitian for individualized diet advice. Last Reviewed: March 2011 Javi Baldwin MS, MPH, RD   Updated: 3/29/2011     Heart-Healthy Diet   Sodium, Fat, and Cholesterol Controlled Diet       What Is a Heart Healthy Diet? A heart-healthy diet is one that limits sodium , certain types of fat , and cholesterol . This type of diet is recommended for:   People with any form of cardiovascular disease (eg, coronary heart disease , peripheral vascular disease , previous heart attack , previous stroke )   People with risk factors for cardiovascular disease, such as high blood pressure , high cholesterol , or diabetes   Anyone who wants to lower their risk of developing cardiovascular disease   Sodium    Sodium is a mineral found in many foods. In general, most people consume much more sodium than they need. Diets high in sodium can increase blood pressure and lead to edema (water retention). On a heart-healthy diet, you should consume no more than 2,300 mg (milligrams) of sodium per dayabout the amount in one teaspoon of table salt. The foods highest in sodium include table salt (about 50% sodium), processed foods, convenience foods, and preserved foods. Cholesterol    Cholesterol is a fat-like, waxy substance in your blood.  Our bodies make some cholesterol. It is also found in animal products, with the highest amounts in fatty meat, egg yolks, whole milk, cheese, shellfish, and organ meats. On a heart-healthy diet, you should limit your cholesterol intake to less than 200 mg per day. It is normal and important to have some cholesterol in your bloodstream. But too much cholesterol can cause plaque to build up within your arteries, which can eventually lead to a heart attack or stroke. The two types of cholesterol that are most commonly referred to are:   Low-density lipoprotein (LDL) cholesterol  Also known as bad cholesterol, this is the cholesterol that tends to build up along your arteries. Bad cholesterol levels are increased by eating fats that are saturated or hydrogenated. Optimal level of this cholesterol is less than 100. Over 130 starts to get risky for heart disease. High-density lipoprotein (HDL) cholesterol  Also known as good cholesterol, this type of cholesterol actually carries cholesterol away from your arteries and may, therefore, help lower your risk of having a heart attack. You want this level to be high (ideally greater than 60). It is a risk to have a level less than 40. You can raise this good cholesterol by eating olive oil, canola oil, avocados, or nuts. Exercise raises this level, too. Fat    Fat is calorie dense and packs a lot of calories into a small amount of food. Even though fats should be limited due to their high calorie content, not all fats are bad. In fact, some fats are quite healthful. Fat can be broken down into four main types.    The good-for-you fats are:   Monounsaturated fat  found in oils such as olive and canola, avocados, and nuts and natural nut butters; can decrease cholesterol levels, while keeping levels of HDL cholesterol high   Polyunsaturated fat  found in oils such as safflower, sunflower, soybean, corn, and sesame; can decrease total cholesterol and LDL cholesterol   Omega-3 fatty acids  particularly those found in fatty fish (such as salmon, trout, tuna, mackerel, herring, and sardines); can decrease risk of arrhythmias, decrease triglyceride levels, and slightly lower blood pressure   The fats that you want to limit are:   Saturated fat  found in animal products, many fast foods, and a few vegetables; increases total blood cholesterol, including LDL levels   Animal fats that are saturated include: butter, lard, whole-milk dairy products, meat fat, and poultry skin   Vegetable fats that are saturated include: hydrogenated shortening, palm oil, coconut oil, cocoa butter   Hydrogenated or trans fat  found in margarine and vegetable shortening, most shelf stable snack foods, and fried foods; increases LDL and decreases HDL     It is generally recommended that you limit your total fat for the day to less than 30% of your total calories. If you follow an 1800-calorie heart healthy diet, for example, this would mean 60 grams of fat or less per day. Saturated fat and trans fat in your diet raises your blood cholesterol the most, much more than dietary cholesterol does. For this reason, on a heart-healthy diet, less than 7% of your calories should come from saturated fat and ideally 0% from trans fat. On an 1800-calorie diet, this translates into less than 14 grams of saturated fat per day, leaving 46 grams of fat to come from mono- and polyunsaturated fats.    Food Choices on a Heart Healthy Diet   Food Category   Foods Recommended   Foods to Avoid   Grains   Breads and rolls without salted tops Most dry and cooked cereals Unsalted crackers and breadsticks Low-sodium or homemade breadcrumbs or stuffing All rice and pastas   Breads, rolls, and crackers with salted tops High-fat baked goods (eg, muffins, donuts, pastries) Quick breads, self-rising flour, and biscuit mixes Regular bread crumbs Instant hot cereals Commercially prepared rice, pasta, or stuffing mixes   Vegetables   Most fresh, frozen, and low-sodium canned vegetables Low-sodium and salt-free vegetable juices Canned vegetables if unsalted or rinsed   Regular canned vegetables and juices, including sauerkraut and pickled vegetables Frozen vegetables with sauces Commercially prepared potato and vegetable mixes   Fruits   Most fresh, frozen, and canned fruits All fruit juices   Fruits processed with salt or sodium   Milk   Nonfat or low-fat (1%) milk Nonfat or low-fat yogurt Cottage cheese, low-fat ricotta, cheeses labeled as low-fat and low-sodium   Whole milk Reduced-fat (2%) milk Malted and chocolate milk Full fat yogurt Most cheeses (unless low-fat and low salt) Buttermilk (no more than 1 cup per week)   Meats and Beans   Lean cuts of fresh or frozen beef, veal, lamb, or pork (look for the word loin) Fresh or frozen poultry without the skin Fresh or frozen fish and some shellfish Egg whites and egg substitutes (Limit whole eggs to three per week) Tofu Nuts or seeds (unsalted, dry-roasted), low-sodium peanut butter Dried peas, beans, and lentils   Any smoked, cured, salted, or canned meat, fish, or poultry (including melton, chipped beef, cold cuts, hot dogs, sausages, sardines, and anchovies) Poultry skins Breaded and/or fried fish or meats Canned peas, beans, and lentils Salted nuts   Fats and Oils   Olive oil and canola oil Low-sodium, low-fat salad dressings and mayonnaise   Butter, margarine, coconut and palm oils, melton fat   Snacks, Sweets, and Condiments   Low-sodium or unsalted versions of broths, soups, soy sauce, and condiments Pepper, herbs, and spices; vinegar, lemon, or lime juice Low-fat frozen desserts (yogurt, sherbet, fruit bars) Sugar, cocoa powder, honey, syrup, jam, and preserves Low-fat, trans-fat free cookies, cakes, and pies Ernst and animal crackers, fig bars, veto snaps   High-fat desserts Broth, soups, gravies, and sauces, made from instant mixes or other high-sodium ingredients Salted snack foods Canned olives Meat  tenderizers, seasoning salt, and most flavored vinegars   Beverages   Low-sodium carbonated beverages Tea and coffee in moderation Soy milk   Commercially softened water   Suggestions   Make whole grains, fruits, and vegetables the base of your diet. Choose heart-healthy fats such as canola, olive, and flaxseed oil, and foods high in heart-healthy fats, such as nuts, seeds, soybeans, tofu, and fish. Eat fish at least twice per week; the fish highest in omega-3 fatty acids and lowest in mercury include salmon, herring, mackerel, sardines, and canned chunk light tuna. If you eat fish less than twice per week or have high triglycerides, talk to your doctor about taking fish oil supplements. Read food labels. For products low in fat and cholesterol, look for fat free, low-fat, cholesterol free, saturated fat free, and trans fat freeAlso scan the Nutrition Facts Label, which lists saturated fat, trans fat, and cholesterol amounts. For products low in sodium, look for sodium free, very low sodium, low sodium, no added salt, and unsalted   Skip the salt when cooking or at the table; if food needs more flavor, get creative and try out different herbs and spices. Garlic and onion also add substantial flavor to foods. Trim any visible fat off meat and poultry before cooking, and drain the fat off after santizo. Use cooking methods that require little or no added fat, such as grilling, boiling, baking, poaching, broiling, roasting, steaming, stir-frying, and sauting. Avoid fast food and convenience food. They tend to be high in saturated and trans fat and have a lot of added salt. Talk to a registered dietitian for individualized diet advice. Last Reviewed: March 2011 Christiano Chacko MS, MPH, RD   Updated: 3/29/2011       DASH Diet: After Your Visit  Your Care Instructions  The DASH diet is an eating plan that can help lower your blood pressure.  DASH stands for Dietary Approaches to Stop Hypertension. Hypertension is high blood pressure. The DASH diet focuses on eating foods that are high in calcium, potassium, and magnesium. These nutrients can lower blood pressure. The foods that are highest in these nutrients are fruits, vegetables, low-fat dairy products, nuts, seeds, and legumes. But taking calcium, potassium, and magnesium supplements instead of eating foods that are high in those nutrients does not have the same effect. The DASH diet also includes whole grains, fish, and poultry. The DASH diet is one of several lifestyle changes your doctor may recommend to lower your high blood pressure. Your doctor may also want you to decrease the amount of sodium in your diet. Lowering sodium while following the DASH diet can lower blood pressure even further than just the DASH diet alone. Follow-up care is a key part of your treatment and safety. Be sure to make and go to all appointments, and call your doctor if you are having problems. Its also a good idea to know your test results and keep a list of the medicines you take. How can you care for yourself at home? Following the DASH diet  Eat 4 to 5 servings of fruit each day. A serving is 1 medium-sized piece of fruit, ½ cup chopped or canned fruit, 1/4 cup dried fruit, or 4 ounces (½ cup) of fruit juice. Choose fruit more often than fruit juice. Eat 4 to 5 servings of vegetables each day. A serving is 1 cup of lettuce or raw leafy vegetables, ½ cup of chopped or cooked vegetables, or 4 ounces (½ cup) of vegetable juice. Choose vegetables more often than vegetable juice. Get 2 to 3 servings of low-fat and fat-free dairy each day. A serving is 8 ounces of milk, 1 cup of yogurt, or 1 ½ ounces of cheese. Eat 7 to 8 servings of grains each day. A serving is 1 slice of bread, 1 ounce of dry cereal, or ½ cup of cooked rice, pasta, or cooked cereal. Try to choose whole-grain products as much as possible.   Limit lean meat, poultry, and fish to 6 ounces each day. Six ounces is about the size of two decks of cards. Eat 4 to 5 servings of nuts, seeds, and legumes (cooked dried beans, lentils, and split peas) each week. A serving is 1/3 cup of nuts, 2 tablespoons of seeds, or ½ cup cooked dried beans or peas. Limit sweets and added sugars to 5 servings or less a week. A serving is 1 tablespoon jelly or jam, ½ cup sorbet, or 1 cup of lemonade. Tips for success  Start small. Do not try to make dramatic changes to your diet all at once. You might feel that you are missing out on your favorite foods and then be more likely to not follow the plan. Make small changes, and stick with them. Once those changes become habit, add a few more changes. Try some of the following:  Make it a goal to eat a fruit or vegetable at every meal and at snacks. This will make it easy to get the recommended amount of fruits and vegetables each day. Try yogurt topped with fruit and nuts for a snack or healthy dessert. Add lettuce, tomato, cucumber, and onion to sandwiches. Combine a ready-made pizza crust with low-fat mozzarella cheese and lots of vegetable toppings. Try using tomatoes, squash, spinach, broccoli, carrots, cauliflower, and onions. Have a variety of cut-up vegetables with a low-fat dip as an appetizer instead of chips and dip. Sprinkle sunflower seeds or chopped almonds over salads. Or try adding chopped walnuts or almonds to cooked vegetables. Try some vegetarian meals using beans and peas. Add garbanzo or kidney beans to salads. Make burritos and tacos with mashed mcdonald beans or black beans    © 2111-5293 [employer]. Care instructions adapted under license by [facility].  This care instruction is for use with your licensed healthcare professional. If you have questions about a medical condition or this instruction, always ask your healthcare professional. [place] [address] any warranty or liability for your use of this information. Content Version: 9.4.70733; Last Revised: March 15, 2012                Patient information: Weight loss treatments    INTRODUCTION -- Obesity is a major international problem, and Americans are among the heaviest people in the world. The percentage of obese people in the [place] has risen steadily. Many people find that although they initially lose weight by dieting, they quickly regain the weight after the diet ends. Because it so hard to keep weight off over time, it is important to have as much information and support as possible before starting a diet. You are most likely to be successful in losing weight and keeping it off when you believe that your body weight can be controlled. STARTING A WEIGHT LOSS PROGRAM -- Some people like to talk to their doctor or nurse to get help choosing the best plan, monitoring progress, and getting advice and support along the way. To know what treatment (or combination of treatments) will work best, determine your body mass index (BMI) and waist circumference (measurement). The BMI is calculated from your height and weight. A person with a BMI between 25 and 29.9 is considered overweight   A person with a BMI of 30 or greater is considered to be obese  A waist circumference greater than 35 inches (88 cm) in women and 40 inches (102 cm) in men increases the risk of obesity-related complications, such as heart disease and diabetes. People who are obese and who have a larger waist size may need more aggressive weight loss treatment than others. Talk to your doctor or nurse for advice. Types of treatment -- Based on your measurements and your medical history, your doctor or nurse can determine what combination of weight loss treatments would work best for you. Treatments may include changes in lifestyle, exercise, dieting, and, in some cases, weight loss medicines or weight loss surgery.  Weight loss surgery, also called bariatric surgery, is reserved for people with severe obesity who have not responded to other weight loss treatments. SETTING A WEIGHT LOSS GOAL -- It is important to set a realistic weight loss goal. Your first goal should be to avoid gaining more weight and staying at your current weight (or within 5 percent). Many people have a \"dream\" weight that is difficult or impossible to achieve. People at high risk of developing diabetes who are able to lose 5 percent of their body weight and maintain this weight will reduce their risk of developing diabetes by about 50 percent and reduce their blood pressure. This is a success. Losing more than 15 percent of your body weight and staying at this weight is an extremely good result, even if you never reach your \"dream\" or \"ideal\" weight. LIFESTYLE CHANGES -- Programs that help you to change your lifestyle are usually run by psychologists or other professionals. The goals of lifestyle changes are to help you change your eating habits, become more active, and be more aware of how much you eat and exercise, helping you to make healthier choices. This type of treatment can be broken down into three steps: The triggers that make you want to eat   Eating   What happens after you eat  Triggers to eat -- Determining what triggers you to eat involves figuring out what foods you eat and where and when you eat. To figure out what triggers you to eat, keep a record for a few days of everything you eat, the places where you eat, how often you eat, and the emotions you were feeling when you ate. For some people, the trigger is related to a certain time of day or night. For others, the trigger is related to a certain place, like sitting at a desk working. Eating -- You can change your eating habits by breaking the chain of events between the trigger for eating and eating itself. There are many ways to do this.  For instance, you can:  Limit where you eat to a few places (eg, dining room)   Restrict the number of utensils (eg, only a fork) used for eating   Drink a sip of water between each bite   Chew your food a certain number of times   Get up and stop eating every few minutes  What happens after you eat -- Rewarding yourself for good eating behaviors can help you to develop better habits. This is not a reward for weight loss; instead, it is a reward for changing unhealthy behaviors. Do not use food as a reward. Some people find money, clothing, or personal care (eg, a hair cut, manicure, or massage) to be effective rewards. Treat yourself immediately after making better eating choices to reinforce the value of the good behavior. You need to have clear behavior goals, and you must have a time frame for reaching your goals. Reward small changes along the way to your final goal.  Other factors that contribute to successful weight loss -- Changing your behavior involves more than just changing unhealthy eating habits; it also involves finding people around you to support your weight loss, reducing stress, and learning to be strong when tempted by food. Establish a \"panfilo\" system -- Having a friend or family member available to provide support and reinforce good behavior is very helpful. The support person needs to understand your goals. Learn to be strong -- Learning to be strong when tempted by food is an important part of losing weight. As an example, you will need to learn how to say \"no\" and continue to say no when urged to eat at parties and social gatherings. Develop strategies for events before you go, such as eating before you go or taking low-calorie snacks and drinks with you. Develop a support system -- Having a support system is helpful when losing weight. This is why many commercial groups are successful. Family support is also essential; if your family does not support your efforts to lose weight, this can slow your progress or even keep you from losing weight.    Positive thinking -- People often have conversations with themselves in their head; these conversations can be positive or negative. If you eat a piece of cake that was not planned, you may respond by thinking, \"Oh, you stupid idiot, you've blown your diet!\" and as a result, you may eat more cake.  A positive thought for the same event could be, \"Well, I ate cake when it was not on my plan. Now I should do something to get back on track.\" A positive approach is much more likely to be successful than a negative one.  Reduce stress -- Although stress is a part of everyday life, it can trigger uncontrolled eating in some people. It is important to find a way to get through these difficult times without eating or by eating low-calorie food, like raw vegetables. It may be helpful to imagine a relaxing place that allows you to temporarily escape from stress. With deep breaths and closed eyes, you can imagine this relaxing place for a few minutes.   Self-help programs -- Self-help programs like Weight Watchers®, Overeaters Anonymous®, and Take Off Pounds Sensibly (TOPS)© work for some people. As with all weight loss programs, you are most likely to be successful with these plans if you make long-term changes in how you eat.  CHOOSING A DIET -- A calorie is a unit of energy found in food. Your body needs calories to function. The goal of any diet is to burn up more calories than you eat.   How quickly you lose weight depends upon several factors, such as your age, gender, and starting weight.  Older people have a slower metabolism than young people, so they lose weight more slowly.   Men lose more weight than women of similar height and weight when dieting because they use more energy.   People who are extremely overweight lose weight more quickly than those who are only mildly overweight.  Try not to drink alcohol or drinks with added sugar, and most sweets (candy, cakes, cookies), since they rarely contain important nutrients.  Portion-controlled diets -- One  simple way to diet is to buy packaged foods, like frozen low-calorie meals or meal-replacement canned drinks. A typical meal plan for one day may include:  A meal-replacement drink or breakfast bar for breakfast   A meal-replacement drink or a frozen low-calorie (250 to 350 calories) meal for lunch   A frozen low-calorie meal or other prepackaged, calorie-controlled meal, along with extra vegetables for dinner  This would give you 1000 to 1500 calories per day. Low-fat diet -- To reduce the amount of fat in your diet, you can:  Eat low-fat foods. Low-fat foods are those that contain less than 30 percent of calories from fat. Fat is listed on the food facts label (figure 1). Count fat grams. For a 1500 calorie diet, this would mean about 45 g or fewer of fat per day. Low-carbohydrate diet -- Low- and very-low-carbohydrate diets (eg, Atkins diet, [employer]) have become popular ways to lose weight quickly. With a very-low-carbohydrate diet, you eat between 0 and 60 grams of carbohydrates per day (a standard diet contains 200 to 300 grams of carbohydrates)   With a low-carbohydrate diet, you eat between 60 and 130 grams of carbohydrates per day  Carbohydrates are found in fruits, vegetables, and grains (including breads, rice, pasta, and cereal), alcoholic beverages, and in dairy products. Meat and fish do not contain carbohydrates. Side effects of very-low-carbohydrate diets can include constipation, headache, bad breath, muscle cramps, diarrhea, and weakness. Mediterranean diet -- The term \"Mediterranean diet\" refers to a way of eating that is common in [place] regions around [facility]. Although there is some variation in Mediterranean diets, there are some similarities.  Most Mediterranean diets include:  A high level of monounsaturated fats (from olive or canola oil, walnuts, pecans, almonds) and a low level of saturated fats (from butter)   A high amount of vegetables, fruits, legumes, and grains (7 to 10 servings of fruits and vegetables per day)   A moderate amount of milk and dairy products, mostly in the form of cheese. Use low-fat dairy products (skim milk, fat-free yogurt, low-fat cheese). A relatively low amount of red meat and meat products. Substitute fish or poultry for red meat. For those who drink alcohol, a modest amount (mainly as red wine) may help to protect against cardiovascular disease. A modest amount is up to one (4 ounce) glass per day for women and up to two glasses per day for men. Which diet is best? -- Studies have compared different diets, including:  Very-low-carbohydrate (Atkins)   Macronutrient balance controlling glycemic load (Zone®)   Reduced-calorie (Weight Watchers®)   Very-low-fat (Ornish)  No one diet is \"best\" for weight loss. Any diet will help you to lose weight if you stick with the diet. Therefore, it is important to choose a diet that includes foods you like. Fad diets -- Fad diets often promise quick weight loss (more than 1 to 2 pounds per week) and may claim that you do not need to exercise or give up favorite foods. Some fad diets cost a lot of money, because you have to pay for seminars or pills. Fad diets generally lack any scientific evidence that they are safe and effective, but instead rely on \"before\" and \"after\" photos or testimonials. Diets that sound too good to be true usually are. These plans are a waste of time and money and are not recommended. A doctor, nurse, or nutritionist can help you find a safe and effective way to lose weight and keep it off. WEIGHT LOSS MEDICINES -- Taking a weight loss medicine may be helpful when used in combination with diet, exercise, and lifestyle changes. However, it is important to understand the risks and benefits of these medicines.  It is also important to be realistic about your goal weight using a weight loss medicine; you may not reach your \"dream\" weight, but you may be able to reduce your risk of diabetes or heart disease. Weight loss medicines may be recommended for people who have not been able to lose weight with diet and exercise who have a:  BMI of 30 or more    BMI between 27 and 29.9 and have other medical problems, such as diabetes, high cholesterol, or high blood pressure  Two weight loss medicines are approved in the [facility] for long-term use. These are sibutramine and orlistat. Other weight loss medicines (phentermine, diethylpropion) are available but are only approved for short-term use (up to 12 weeks). Sibutramine -- Sibutramine (Meridia®, Reductil®) is a medicine that reduces your appetite. In people who take the medicine for one year, the average weight loss is 10 percent of the initial body weight (5 percent more than those who took a placebo treatment). Side effects of sibutramine include insomnia, dry mouth, and constipation. Increases in blood pressure can occur. Therefore, blood pressure is usually monitored during treatment. There is no evidence that sibutramine causes heart or lung problems (like dexfenfluramine and fenfluramine (Phen/Fen)). However, experts agree that sibutramine should not used by people with coronary heart disease, heart failure, uncontrolled hypertension, stroke, irregular heart rhythms, or peripheral vascular disease (poor circulation in the legs). Orlistat -- Orlistat (Xenical® 120 mg capsules) is a medicine that reduces the amount of fat your body absorbs from the foods you eat. A lower-dose version is now available without a prescription (Nacho® 60 mg capsules) in many countries, including the [facility]. The medicine is recommended three times per day, taken with a meal; you can skip a dose if you skip a meal or if the meal contains no fat. After one year of treatment with orlistat, the average weight loss is approximately 8 to 10 percent of initial body weight (4 percent more than in those who took a placebo).  Cholesterol levels often improve, and blood pressure sometimes falls. In people with diabetes, orlistat may help control blood sugar levels. Side effects occur in 15 to 10 percent of people and may include stomach cramps, gas, diarrhea, leakage of stool, or oily stools. These problems are more likely when you take orlistat with a high-fat meal (if more than 30 percent of calories in the meal are from fat). Side effects usually improve as you learn to avoid high-fat foods. Severe liver injury has been reported rarely in patients taking orlistat, but it is not known if orlistat caused the liver problems. Diet supplements -- Diet supplements are widely used by people who are trying to lose weight, although the safety and efficacy of these supplements are often unproven. A few of the more common diet supplements are discussed below; none of these are recommended because they have not been studied carefully, and there is no proof they are safe or effective. Chitosan and wheat dextrin are ineffective for weight loss, and their use is not recommended. Ephedra, a compound related to ephedrine, is no longer available in the [place] due to safety concerns. Many nonprescription diet pills previously contained ephedra. Although some studies have shown that ephedra helps with weight loss, there can be serious side effects (psychiatric symptoms, palpitations, and stomach upset), including death. There are not enough data about the safety and efficacy of chromium, ginseng, glucomannan, green tea, hydroxycitric acid, L carnitine, psyllium, pyruvate supplements, [place] wort, and conjugated linoleic acid. Two supplements from [facility], [address] [place] (also known as the Earl Deras 15 pill) and Herbathin dietary supplement, have been shown to contain prescription drugs. Hoodia gordonii is a dietary supplement derived from a plant in [place]. It is not recommended because there is no proof that it is safe or effective. Bitter orange (Citrus aurantium) can increase your heart rate and blood pressure and is not recommended. SHOULD I HAVE SURGERY TO LOSE WEIGHT? -- Weight loss surgery is recommended ONLY for people with one of the following:  Severe obesity (body mass index above 40) (calculator 1 and calculator 2) who have not responded to diet, exercise, or weight loss medicines   Body mass index between 35 and 40, along with a serious medical problem (including diabetes, severe joint pain, or sleep apnea) that would improve with weight loss  You should be sure that you understand the potential risks and benefits of weight loss surgery. You must be motivated and willing to make lifelong changes in how you eat to reach and maintain a healthier weight after surgery. You must also be realistic about weight loss after surgery (see 'Effectiveness of weight loss surgery' below). PREPARING FOR WEIGHT LOSS SURGERY -- Most people who have weight loss surgery will meet with several specialists before surgery is scheduled. This often includes a dietitian, mental health counselor, a doctor who specializes in care of obese people, and a surgeon who performs weight loss surgery (bariatric surgeon). You may need to work with these providers for several weeks or months before surgery. The nutritionist will explain what and how much you will be able to eat after surgery. You may also need to lose a small amount of weight before surgery. The mental health specialist will help you to cope with stress and other factors that can make it harder to lose weight or trigger you to eat   The medical doctor will determine whether you need other tests, counseling, or treatment before surgery. He or she might also help you begin a medical weight loss program so that you can lose some weight before surgery. The bariatric surgeon will meet with you to discuss the surgeries available to treat obesity.  He or she will also make sure you are a good candidate for surgery. TYPES OF WEIGHT LOSS SURGERY -- There are several types of weight loss surgeries, the most common being lap banding, gastric bypass, and gastric sleeve. Lap banding -- Laparoscopic adjustable gastric banding (LAGB), or lap banding, is a surgery that uses an adjustable band around the opening to the stomach (figure 1). This reduces the amount of food that you can eat at one time. Lap banding is done through small incisions, with a laparoscope. The band can be adjusted after surgery, allowing you to eat more or less food. Adjustments to the size and tightness of the band are made by using a needle to add or remove fluid from a port (a small container under the skin that is connected to the band). Adding fluid to the band makes it tighter which restricts the amount of food you can eat and may help you to lose more weight. Lap banding is a popular choice because it is relatively simple to perform, can be adjusted or removed, and has a low risk of serious complications immediately after surgery. However, weight loss with the lap band depends on your ability to follow the program closely. You will need to prepare nutritious meals that [facility] with\" the band, not against it. For example, the lap band will not work well if you eat or drink a large amount of liquid calories (like ice cream). The band will not help you to feel full when you eat/drink liquid calories. Weight loss ranges from 45 to 75 percent after two years. As an example, a person who is 120 pounds overweight could expect to lose approximately 54 to 90 pounds in the two years after lap banding. Gastric bypass -- Robert-en-Y gastric bypass, also called gastric bypass, helps you to lose weight by reducing the amount of food you can eat and reducing the number of calories and nutrients you absorb from the food you eat.   To perform gastric bypass, a surgeon creates a small stomach pouch by dividing the stomach and attaching it to the small intestine. This helps you to lose weight in two ways: The smaller stomach can hold less food than before surgery. This causes you to feel full after eating a very small amount of food or liquid. Over time, the pouch might stretch, allowing you to eat more food. The body absorbs fewer calories, since food bypasses most of the stomach as well as the upper small intestine. This new arrangement seems to decrease your appetite and change how you break down foods by changing the release of various hormones. Gastric bypass can be performed as open surgery (through an incision on the abdomen) or laparoscopically, which uses smaller incisions and smaller instruments. Both the laparoscopic and open techniques have risks and benefits. You and your surgeon should work together to decide which surgery, if any, is right for you. Gastric bypass has a high success rate, and people lose an average of 62 to 68 percent of their excess body weight in the first year. Weight loss typically levels off after one to two years, with an overall excess weight loss between 50 and 75 percent. For a person who is 120 pounds overweight, an average of 60 to 90 pounds of weight loss would be expected. Gastric sleeve -- Gastric sleeve, also known as sleeve gastrectomy, is a surgery that reduces the size of the stomach and makes it into a narrow tube (figure 3). The new stomach is much smaller and produces less of the hormone (ghrelin) that causes hunger, helping you feel satisfied with less food. Sleeve gastrectomy is safer than gastric bypass because the intestines are not rearranged, and there is less chance of malnutrition. It also appears to control hunger better than lap banding. It might be safer than the lap banding because no foreign materials are used. The gastric sleeve has a good success rate, and people lose an average of 33 percent of their excess body weight in the first year.  For a person who is 120 pounds overweight, this would mean losing about 40 pounds in the first year. WEIGHT LOSS SURGERY COMPLICATIONS -- A variety of complications can occur with weight loss surgery. The risks of surgery depend upon which surgery you have and any medical problems you had before surgery. Some of the more common early surgical complications (one to six weeks after surgery) include:  Bleeding   Infection   Blockage or tear in the bowels   Need for further surgery  Important medical complications after surgery can include blood clots in the legs or lungs, heart attack, pneumonia, and urinary tract infection. Complications are less likely when surgery is performed in centers that are experienced in weight loss surgery. In general, centers with experience in weight loss surgery have:  Board-certified doctors and surgeons   A team of support staff (dietitians, counselors, nurses)   Long-term follow-up after surgery   Hospital staff experienced with the care of weight loss patients. This includes nurses who are trained in the care of patients immediately after surgery and anesthesiologists who are experienced in caring for the morbidly obese. EFFECTIVENESS OF WEIGHT LOSS SURGERY -- The goal of weight loss surgery is to reduce the risk of illness or death associated with obesity. Weight loss surgery can also help you to feel and look better, reduce the amount of money you spend on medicines, and cut down on sick days. As an example, weight loss surgery can improve health problems related to obesity (diabetes, high blood pressure, high cholesterol, sleep apnea) to the point that you need less or no medicine. Finally, weight loss surgery might reduce your risk of developing heart disease, cancer, and certain infections. AFTER WEIGHT LOSS SURGERY -- You will need to stay in the hospital until your team feels that it is safe for you to leave (on average, one to three days). Do not drive if you are taking prescription pain medicine.  Begin exercising as soon as possible once you have healed; most weight loss centers will design an exercise program for you. Once you are home, it is important to eat and drink exactly what your doctor and dietitian recommend. You will see your doctor, nurse, and dietitian on a regular basis after surgery to monitor your health, diet, and weight loss. You will be able to slowly increase how much you eat over time, although it will always be important to:  Eat small, frequent meals and not skip meals   Chew your food slowly and completely   Avoid eating while \"distracted\" (such as eating while watching TV)   Stop eating when you feel full   Drink liquids at least 30 minutes before or after eating   Avoid foods high in fat or sugar   Take vitamin supplements, as recommended  It can take several months to learn to listen to your body so that you know when you are hungry and when you are full. You may dislike foods you previously loved, and you may begin to prefer new foods. This can be a frustrating process for some people, so talk to your dietitian if you are having trouble. It usually takes between one and two years to lose weight after surgery. After reaching their goal weight, some people have plastic surgery (called \"body contouring\") to remove excess skin from the body, particularly in the abdominal area. Before you decide to have weight loss surgery, you must commit to staying healthy for life. This includes following up with your healthcare team, exercising most days of the week, and eating a sensible diet every day. It can be difficult to develop new eating and exercise habits after weight loss surgery, and you will have to work hard to stick to your goals. Recovering from surgery and losing weight can be stressful and emotional, and it is important to have the support of family and friends. Working with a , therapist, or support group can help you through the ups and downs.   WHERE TO GET MORE [facility] healthcare provider is the best source of information for questions and concerns related to your medical problem. This article will be updated as needed every four months on our Web site ([email or web address])    High-Fiber Diet     What Is Fiber? Dietary fiber is a form of carbohydrate found in plants that cannot be digested by humans. All plants contain fiber, including fruits, vegetables, grains, and legumes. Fiber is often classified into two categories: soluble and insoluble. Soluble fiber draws water into the bowel and can help slow digestion. Examples of foods that are high in soluble fiber include oatmeal, oat bran, barley, legumes (eg, beans and peas), apples, and strawberries. Insoluble fiber speeds digestion and can add bulk to the stool. Examples of foods that are high in insoluble fiber include whole-wheat products, wheat bran, cauliflower, green beans, and potatoes. Why Follow a High-Fiber Diet? A high-fiber diet is often recommended to prevent and treat constipation , hemorrhoids , diverticulitis , and irritable bowel syndrome . Eating a high-fiber diet can also help improve your cholesterol levels, lower your risk of coronary heart disease , reduce your risk of type 2 diabetes , and lower your weight. For people with type 1 or 2 diabetes, a high-fiber diet can also help stabilize blood sugar levels. How Much Fiber Should I Eat? A high-fiber diet should contain  20-35 grams  of fiber a day. This is actually the amount recommended for the general adult population; however, most Americans eat only 15 grams of fiber per day. Digestion of Fiber   Eating a higher fiber diet than usual can take some getting used to by your body's digestive system. To avoid the side effects of sudden increases in dietary fiber (eg, gas, cramping, bloating, and diarrhea), increase fiber gradually and be sure to drink plenty of fluids every day.    Tips for Increasing Fiber Intake Whenever possible, choose whole grains over refined grains (eg, brown rice instead of white rice, whole-wheat bread instead of white bread). Include a variety of grains in your diet, such as wheat, rye, barley, oats, quinoa, and bulgur. Eat more vegetarian-based meals. Here are some ideas: black bean burgers, eggplant lasagna, and veggie tofu stir-walton. Choose high-fiber snacks, such as fruits, popcorn, whole-grain crackers, and nuts. Make whole-grain cereal or whole-grain toast part of your daily breakfast regime. When eating out, whether ordering a sandwich or dinner, ask for extra vegetables. When baking, replace part of the white flour with whole-wheat flour. Whole-wheat flour is particularly easy to incorporate into a recipe. High-Fiber Diet Eating Guide   Food Category   Foods Recommended   Notes   Grains   Whole-grain breads, muffins, bagels, or ryanne bread Rye bread Whole-wheat crackers or crisp breads Whole-grain or bran cereals Oatmeal, oat bran, or grits Wheat germ Whole-wheat pasta and brown rice   Read the ingredients list on food labels. Look for products that list \"whole\" as the first ingredient (eg, whole-wheat, whole oats). Choose cereals with at least 2 grams of fiber per serving. Vegetables   All vegetables, especially asparagus, bean sprouts, broccoli, [place] sprouts, cabbage, carrots, cauliflower, celery, corn, greens, green beans, green pepper, onions, peas, potatoes (with skin), snow peas, spinach, squash, sweet potatoes, tomatoes, zucchini   For maximum fiber intake, eat the peels of fruits and vegetablesjust be sure to wash them well first.   Fruits   All fruits, especially apples, berries, grapefruits, mangoes, nectarines, oranges, peaches, pears, dried fruits (figs, dates, prunes, raisins)   Choose raw fruits and vegetables over juice, cooked, or cannedraw fruit has more fiber. Dried fruit is also a good source of fiber.    Milk   With the exception of yogurt containing inulin (a type of fiber), dairy foods provide little fiber.   Add more fiber by topping your yogurt or cottage cheese with fresh fruit, whole grain or bran cereals, nuts, or seeds.   Meats and Beans   All beans and peas, especially Garbanzo beans, kidney beans, lentils, lima beans, split peas, and mcdonald beans All nuts and seeds, especially almonds, peanuts, Brazil nuts, cashews, peanut butter, walnuts, sesame and sunflower seeds All meat, poultry, fish, and eggs   Increase fiber in meat dishes by adding mcdonald beans, kidney beans, black-eyed peas, bran, or oatmeal. If you are following a low-fat diet, use nuts and seeds only in moderation.   Fats and Oils   All in moderation   Fats and oils do not provide fiber   Snacks, Sweets, and Condiments   Fruit Nuts Popcorn, whole-wheat pretzels, or trail mix made with dried fruits, nuts, and seeds Cakes, breads, and cookies made with oatmeal or whole-wheat flour   Most snack foods do not provide much fiber. Choose snacks with at least 2 grams of fiber per serving.     Last Reviewed: March 2011 Alise Kearney MS, MPH, RD   Updated: 3/29/2011     Keep Your Memory Sharp       Many factors can affect your ability to remembera hectic lifestyle, aging, stress, chronic disease, and certain medicines. But, there are steps you can take to sharpen your mind and help preserve your memory.   Challenge Your Brain   Regularly challenging your mind may help keeps it in top shape. Good mental exercises include:   Crossword puzzlesUse a dictionary if you need it; you will learn more that way.   Brainteasers Try some!   Crafts, such as wood working and sewing   Hobbies, such as gardening and building model airplanes   SocializingVisit old friends or join groups to meet new ones.   Reading   Learning a new language   Taking a class, whether it be art history or rox chi   TravelingExperience the food, history, and culture of your destination   Learning to use a computer   Going to  museums, the theater, or thought-provoking movies   Changing things in your daily life, such as reversing your pattern in the grocery store or brushing your teeth using your nondominant hand   Use Memory Aids   There is no need to remember every detail on your own. These memory aids can help:   Calendars and day planners   Electronic organizers to store all sorts of helpful informationThese devices can \"beep\" to remind you of appointments. A book of days to record birthdays, anniversaries, and other occasions that occur on the same date every year   Detailed \"to-do\" lists and strategically placed sticky notes   Quick \"study\" sessionsBefore a gathering, review who will be there so their names will be fresh in your mind. Establish routinesFor example, keep your keys, wallet, and umbrella in the same place all the time or take medicine with your 8:00 AM glass of juice   Live a Healthy Life   Many actions that will keep your body strong will do the same for your mind. For example:   Talk to Your Doctor About Herbs and Supplements    Malnutrition and vitamin deficiencies can impair your mental function. For example, vitamin B12 deficiency can cause a range of symptoms, including confusion. But, what if your nutritional needs are being met? Can herbs and supplements still offer a benefit? Researchers have investigated a range of natural remedies, such as ginkgo , ginseng , and the supplement phosphatidylserine (PS). So far, though, the evidence is inconsistent as to whether these products can improve memory or thinking. If you are interested in taking herbs and supplements, talk to your doctor first because they may interact with other medicines that you are taking. Exercise Regularly    Among the many benefits of regular exercise are increased blood flow to the brain and decreased risk of certain diseases that can interfere with memory function. One study found that even moderate exercise has a beneficial effect. Examples of \"moderate\" exercise include:   Playing 18 holes of golf once a week, without a cart   Playing tennis twice a week   Walking one mile per day   Manage Stress    It can be tough to remember what is important when your mind is cluttered. Make time for relaxation. Choose activities that calm you down, and make it routine. Manage Chronic Conditions    Side effects of high blood pressure , diabetes, and heart disease can interfere with mental function. Many of the lifestyle steps discussed here can help manage these conditions. Strive to eat a healthy diet, exercise regularly, get stress under control, and follow your doctor's advice for your condition. Minimize Medications    Talk to your doctor about the medicines that you take. Some may be unnecessary. Also, healthy lifestyle habits may lower the need for certain drugs. Last Reviewed: April 2010 Earnest Yoon MD   Updated: 4/13/2010     Keeping Home a [address] [facility]       As we get older, changes in balance, gait, strength, vision, hearing, and cognition make even the most youthful senior more prone to accidents. Falls are one of the leading health risks for older people. This increased risk of falling is related to:   Aging process (eg, decreased muscle strength, slowed reflexes)   Higher incidence of chronic health problems (eg, arthritis, diabetes) that may limit mobility, agility or sensory awareness   Side effects of medicine (eg, dizziness, blurred vision)especially medicines like prescription pain medicines and drugs used to treat mental health conditions   Depending on the brittleness of your bones, the consequences of a fall can be serious and long lasting. Home Life   Research by the Association of Aging [facility]) shows that some home accidents among older adults can be prevented by making simple lifestyle changes and basic modifications and repairs to the home environment.  Here are some lifestyle changes that experts recommend:   Have your hearing and vision checked regularly. Be sure to wear prescription glasses that are right for you. Speak to your doctor or pharmacist about the possible side effects of your medicines. A number of medicines can cause dizziness. If you have problems with sleep, talk to your doctor. Limit your intake of alcohol. If necessary, use a cane or walker to help maintain your balance. Wear supportive, rubber-soled shoes, even at home. If you live in a region that gets wintry weather, you may want to put special cleats on your shoes to prevent you from slipping on the snow and ice. Exercise regularly to help maintain muscle tone, agility, and balance. Always hold the banister when going up or down stairs. Also, use  bars when getting in or out of the bath or shower, or using the toilet. To avoid dizziness, get up slowly from a lying down position. Sit up first, dangling your legs for a minute or two before rising to a standing position. Overall Home Safety Check   According to the Consumer Product Safety Commision's \"Older Consumer Home Safety Checklist,\" it is important to check for potential hazards in each room. And remember, proper lighting is an essential factor in home safety. If you cannot see clearly, you are more likely to fall. Important questions to ask yourself include:   Are lamp, electric, extension, and telephone cords placed out of the flow of traffic and maintained in good condition? Have frayed cords been replaced? Are all small rugs and runners slip resistant? If not, you can secure them to the floor with a special double-sided carpet tape. Are smoke detectors properly locatedone on every floor of your home and one outside of every sleeping area? Are they in good working order? Are batteries replaced at least once a year? Do you have a well-maintained carbon monoxide detector outside every sleeping are in your home?    Does your furniture layout leave plenty of space to maneuver between and around chairs, tables, beds, and sofas? Are hallways, stairs and passages between rooms well lit? Can you reach a lamp without getting out of bed? Are floor surfaces well maintained? Shag rugs, high-pile carpeting, tile floors, and polished wood floors can be particularly slippery. Stairs should always have handrails and be carpeted or fitted with a non-skid tread. Is your telephone easily reachable. Is the cord safely tucked away? Room by Room   According to the Association of Aging, bathrooms and corine are the two most potentially hazardous rooms in your home. In the Kitchen    Be sure your stove is in proper working order and always make sure burners and the oven are off before you go out or go to sleep. Keep pots on the back burners, turn handles away from the front of the stove, and keep stove clean and free of grease build-up. Kitchen ventilation systems and range exhausts should be working properly. Keep flammable objects such as towels and pot holders away from the cooking area except when in use. Make sure kitchen curtains are tied back. Move cords and appliances away from the sink and hot surfaces. If extension cords are needed, install wiring guides so they do not hang over the sink, range, or working areas. Look for coffee pots, kettles and toaster ovens with automatic shut-offs. Keep a mop handy in the kitchen so you can wipe up spills instantly. You should also have a small fire extinguisher. Arrange your kitchen with frequently used items on lower shelves to avoid the need to stand on a stepstool to reach them. Make sure countertops are well-lit to avoid injuries while cutting and preparing food. In the Bathroom    Use a non-slip mat or decals in the tub and shower, since wet, soapy tile or porcelain surfaces are extremely slippery. Make sure bathroom rugs are non-skid or tape them firmly to the floor.  Bathtubs should have at least one, preferably two, grab bars, firmly attached to structural supports in the wall. (Do not use built-in soap holders or glass shower doors as grab bars.)    Tub seats fitted with non-slip material on the legs allow you to wash sitting down. For people with limited mobility, bathtub transfer benches allow you to slide safely into the tub. Raised toilet seats and toilet safety rails are helpful for those with knee or hip problems. In the [place]    Make sure you use a nightlight and that the area around your bed is clear of potential obstacles. Be careful with electric blankets and never go to sleep with a heating pad, which can cause serious burns even if on a low setting. Use fire-resistant mattress covers and pillows, and NEVER smoke in bed. Keep a phone next to the bed that is programmed to dial 911 at the push of a button. If you have a chronic condition, you may want to sign on with an automatic call-in service. Typically the system includes a small pendant that connects directly to an emergency medical voice-response system. You should also make arrangements to stay in contact with someonefriend, neighbor, family memberon a regular schedule. Fire Prevention   According to the [employer]. (Smoke Alarms for Every) [address] [place] [address], senior citizens are one of the two highest risk groups for death and serious injuries due to residential fires. When cooking, wear short-sleeved items, never a bulky long-sleeved robe. The [facility]'s Safety Checklist for Older Consumers emphasizes the importance of checking basements, garages, workshops and storage areas for fire hazards, such as volatile liquids, piles of old rags or clothing and overloaded circuits. Never smoke in bed or when lying down on a couch or recliner chair. Small portable electric or kerosene heaters are responsible for many home fires and should be used cautiously if at all.  If you do use one, be sure to keep them away from flammable materials. In case of fire, make sure you have a pre-established emergency exit plan. Have a professional check your fireplace and other fuel-burning appliances yearly. [employer]   Baby boomers entering the fu years will continue to see the development of new products to help older adults live safely and independently in spite of age-related changes. Making Life More Livable  , by Supriya Munoz, lists over 1,000 products for \"living well in the mature years,\" such as bathing and mobility aids, household security devices, ergonomically designed knives and peelers, and faucet valves and knobs for temperature control. Medical supply stores and organizations are good sources of information about products that improve your quality of life and insure your safety.      Last Reviewed: November 2009 Jazmín Reece MD   Updated: 3/7/2011

## 2023-01-23 NOTE — PROGRESS NOTES
Medicare Annual Wellness Visit    Emilie Records is here for Medicare AWV    Assessment & Plan   Medicare annual wellness visit, subsequent      Recommendations for Preventive Services Due: see orders and patient instructions/AVS.  Recommended screening schedule for the next 5-10 years is provided to the patient in written form: see Patient Instructions/AVS.     No follow-ups on file. Subjective       Patient's complete Health Risk Assessment and screening values have been reviewed and are found in Flowsheets. The following problems were reviewed today and where indicated follow up appointments were made and/or referrals ordered. Positive Risk Factor Screenings with Interventions:                 Weight and Activity:  Physical Activity: Insufficiently Active    Days of Exercise per Week: 1 day    Minutes of Exercise per Session: 40 min     On average, how many days per week do you engage in moderate to strenuous exercise (like a brisk walk)?: 1 day  Have you lost any weight without trying in the past 3 months?: No  Body mass index: (!) 37.9    Obesity Interventions:  See AVS for additional education material             Safety:  Do you have any tripping hazards - loose or unsecured carpets or rugs?: (!) Yes  Do you have either shower bars, grab bars, non-slip mats or non-slip surfaces in your shower or bathtub?: (!) No  Interventions:  Patient comments: patient states she does have throw rugs and realizes they are a tripping hazard, and suggested they be removed or replaced with rugs that have a rubber backing. See AVS for additional education material                     Objective   Vitals:    01/23/23 0937   BP: 132/60   Pulse: 72   Temp: 97.3 °F (36.3 °C)   SpO2: 95%   Weight: 214 lb (97.1 kg)   Height: 5' 3\" (1.6 m)      Body mass index is 37.91 kg/m². Allergies   Allergen Reactions    Codeine     Tetanus Toxoids Swelling     Prior to Visit Medications    Medication Sig Taking?  Authorizing Provider   hydroCHLOROthiazide (MICROZIDE) 12.5 MG capsule Take 1 capsule by mouth every morning Yes CAMDEN Sen - CNP   losartan (COZAAR) 25 MG tablet Take 1 tablet by mouth nightly Yes Pk Orozco MD   FLUoxetine (PROZAC) 20 MG capsule TAKE ONE (1) CAPSULE BY MOUTH ONCE DAILY Yes Pk Orozco MD   levothyroxine (SYNTHROID) 112 MCG tablet TAKE 1 TABLET BY MOUTH ONCE DAILY Yes Pk Orozco MD   UNABLE TO FIND CPAP SUPPLIES Yes Pk Orozco MD   loratadine (CLARITIN) 10 MG tablet Take 10 mg by mouth in the morning. Yes Historical Provider, MD   fluticasone (FLONASE) 50 MCG/ACT nasal spray USE 2 SPRAYS IN EACH NOSTRIL DAILY Yes Pk Orozco MD   atorvastatin (LIPITOR) 20 MG tablet Take 1 tablet by mouth daily Yes Pk Orozco MD   UNABLE TO FIND CPAP supplies Yes Pk Orozco MD   fluocinonide (LIDEX) 0.05 % cream Apply topically 2 times daily. Yes River Kyle MD   Mesalamine (LIALDA PO) Take 800 mg by mouth Taking 3 tablets daily Yes Historical Provider, MD   calcium-vitamin D (OSCAL-500) 500-200 MG-UNIT per tablet Take 1 tablet by mouth daily. Yes Historical Provider, MD Ng (Including outside providers/suppliers regularly involved in providing care):   Patient Care Team:  Pk Orozco MD as PCP - General (Family Medicine)  Pk Orozco MD as PCP - [facility] Empaneled Provider     Reviewed and updated this visit:  Tobacco  Allergies  Meds  Med Hx  Surg Hx  Soc Hx  Fam Hx        I, Micah Van LPN, [id number]/77/0887, performed the documented evaluation under the direct supervision of the attending physician.

## 2023-01-23 NOTE — PROGRESS NOTES
Subjective:      Chief Complaint   Patient presents with    6 Month Follow-Up    Hypertension    Hypothyroidism              HPI:  Sam Wallace is a 74 y.o. female who presents today for follow up of chronic conditions as listed below. Depression:  Had temporarily discontinued prozac, but restarted a few months ago as she was having recurrence of symptoms. States that since restarting medication, symptoms have improved and she is now doing well. Would like to continue current dose. Does have history of mild aortic stenosis. Has never seen cardiology. Denies any chest pain, SOB, lightheadedness. Hypothyroidism:  Denying any symptoms. Did have a recent URI, but symptoms are now improving. Prediabetes:  States she has not been as active recently, but is planning on getting back to the gym as soon as URI symptoms resolve. Otherwise feeling well today, no acute concerns. Labs reviewed.         Past Medical History:   Diagnosis Date    Anxiety and depression     Asthma     Colitis, ulcerative ([address]     Jordin Omer    Hyperlipidemia     Hypertension     Hypothyroidism     Mild aortic stenosis 2018, echo    Obesity, Class III, BMI 40-49.9 (morbid obesity) ([address]     Osteopenia 2017    Prediabetes     Rib fracture 2014    8  right side from syncope    Ulcerative colitis with rectal bleeding ([address] 2021        Past Surgical History:   Procedure Laterality Date    BREAST BIOPSY Left     twice; [place]    CARPAL TUNNEL RELEASE Right      SECTION      one    HYSTERECTOMY (CERVIX STATUS UNKNOWN)      MARY BETH AND BSO (CERVIX REMOVED)      TONSILLECTOMY AND ADENOIDECTOMY      age 8    WRIST GANGLION EXCISION Left        Social History     Tobacco Use    Smoking status: Never    Smokeless tobacco: Never   Substance Use Topics    Alcohol use: Yes     Comment: every 2-3 months        Review of Systems   Constitutional:  Negative for activity change, appetite change, chills, fever and unexpected weight change. HENT:  Negative for congestion and sore throat. Respiratory:  Negative for chest tightness and shortness of breath. Cardiovascular:  Negative for chest pain and palpitations. Gastrointestinal:  Negative for abdominal pain, constipation, diarrhea and vomiting. Genitourinary:  Negative for dysuria. Skin:  Negative for color change. Neurological:  Negative for dizziness and light-headedness. Psychiatric/Behavioral:  Negative for dysphoric mood. The patient is not nervous/anxious. Prior to Visit Medications    Medication Sig Taking? Authorizing Provider   hydroCHLOROthiazide (MICROZIDE) 12.5 MG capsule Take 1 capsule by mouth every morning Yes CAMDEN Neil CNP   losartan (COZAAR) 25 MG tablet Take 1 tablet by mouth nightly Yes Abhijeet Brizuela MD   FLUoxetine (PROZAC) 20 MG capsule TAKE ONE (1) CAPSULE BY MOUTH ONCE DAILY Yes Abhijeet Brizuela MD   levothyroxine (SYNTHROID) 112 MCG tablet TAKE 1 TABLET BY MOUTH ONCE DAILY Yes Abhijeet Brizuela MD   UNABLE TO FIND CPAP SUPPLIES Yes Abhijeet Brizuela MD   loratadine (CLARITIN) 10 MG tablet Take 10 mg by mouth in the morning. Yes Historical Provider, MD   fluticasone (FLONASE) 50 MCG/ACT nasal spray USE 2 SPRAYS IN EACH NOSTRIL DAILY Yes Abhijeet Brizuela MD   atorvastatin (LIPITOR) 20 MG tablet Take 1 tablet by mouth daily Yes Abhijeet Brizuela MD   UNABLE TO FIND CPAP supplies Yes Abhijeet Brizuela MD   fluocinonide (LIDEX) 0.05 % cream Apply topically 2 times daily. Yes Monica Jaime MD   Mesalamine (LIALDA PO) Take 800 mg by mouth Taking 3 tablets daily Yes Historical Provider, MD   calcium-vitamin D (OSCAL-500) 500-200 MG-UNIT per tablet Take 1 tablet by mouth daily.  Yes Historical Provider, MD          Objective:      /60 (Site: Left Upper Arm, Position: Sitting, Cuff Size: Medium Adult)   Pulse 72   Ht 5' 3\" (1.6 m)   Wt 214 lb (97.1 kg)   SpO2 95% BMI 37.91 kg/m²      Physical Exam  Vitals and nursing note reviewed. Constitutional:       General: She is not in acute distress. Appearance: Normal appearance. She is not ill-appearing or toxic-appearing. HENT:      Head: Normocephalic and atraumatic. Right Ear: External ear normal.      Left Ear: External ear normal.      Nose: Nose normal.      Mouth/Throat:      Pharynx: Oropharynx is clear. Eyes:      General: No scleral icterus. Right eye: No discharge. Left eye: No discharge. Extraocular Movements: Extraocular movements intact. Conjunctiva/sclera: Conjunctivae normal.   Cardiovascular:      Rate and Rhythm: Normal rate and regular rhythm. Heart sounds: Murmur heard. Pulmonary:      Effort: Pulmonary effort is normal.      Breath sounds: Normal breath sounds. No wheezing or rales. Musculoskeletal:         General: No deformity. Cervical back: Normal range of motion and neck supple. No rigidity. Skin:     General: Skin is warm and dry. Findings: No rash. Neurological:      General: No focal deficit present. Mental Status: She is alert. Mental status is at baseline. Motor: No weakness. Psychiatric:         Mood and Affect: Mood normal.         Behavior: Behavior normal.          Assessment / Plan:      1. Essential hypertension  Stable, well controlled. Continue current medications. - CBC with Auto Differential; Future  - Comprehensive Metabolic Panel; Future    2. Prediabetes  Slight increase in HbA1c. Discussed lifestyle modifications, will continue to monitor.   - Hemoglobin A1C; Future    3. Hypothyroidism, unspecified type  Last TSH wnl. Will monitor labs, continue current dose of synthroid. - TSH; Future    4. Other hyperlipidemia  Lipid panel stable, continue lipitor.   - Lipid Panel; Future    5.  Major depressive disorder, recurrent, in full remission ([address]  Now well controlled again since restarting prozac- will continue. 6. Immunocompromised ([facility]  On mesalamine for UC.     7. Ulcerative rectosigmoiditis without complication (HCC)  Stable, well controlled. Continue current medications. Following with GI.     8.  Mild aortic stenosis  Patient asymptomatic. Will plan on re-imaging if patient develops symptoms. I have spent 25 minutes on this patient encounter. Patient voiced understanding and agreement with plan. All questions/concerns were addressed, risks/side effects of medications were reviewed. Return precautions and after visit summary were provided. No follow-ups on file. or earlier as needed.       Sil Hawkins MD

## 2023-02-13 RX ORDER — ATORVASTATIN CALCIUM 20 MG/1
20 TABLET, FILM COATED ORAL DAILY
Qty: 90 TABLET | Refills: 3 | Status: SHIPPED | OUTPATIENT
Start: 2023-02-13

## 2023-02-17 RX ORDER — FAMOTIDINE 10 MG/ML
20 INJECTION, SOLUTION INTRAVENOUS
Status: CANCELLED | OUTPATIENT
Start: 2023-02-24

## 2023-02-17 RX ORDER — DIPHENHYDRAMINE HYDROCHLORIDE 50 MG/ML
50 INJECTION INTRAMUSCULAR; INTRAVENOUS
Status: CANCELLED | OUTPATIENT
Start: 2023-02-24

## 2023-02-17 RX ORDER — ACETAMINOPHEN 325 MG/1
650 TABLET ORAL
Status: CANCELLED | OUTPATIENT
Start: 2023-02-24

## 2023-02-17 RX ORDER — EPINEPHRINE 1 MG/ML
0.3 INJECTION, SOLUTION, CONCENTRATE INTRAVENOUS PRN
Status: CANCELLED | OUTPATIENT
Start: 2023-02-24

## 2023-02-17 RX ORDER — ONDANSETRON 2 MG/ML
8 INJECTION INTRAMUSCULAR; INTRAVENOUS
Status: CANCELLED | OUTPATIENT
Start: 2023-02-24

## 2023-02-17 RX ORDER — SODIUM CHLORIDE 0.9 % (FLUSH) 0.9 %
5-40 SYRINGE (ML) INJECTION PRN
Status: CANCELLED | OUTPATIENT
Start: 2023-02-24

## 2023-02-17 RX ORDER — ALBUTEROL SULFATE 90 UG/1
4 AEROSOL, METERED RESPIRATORY (INHALATION) PRN
Status: CANCELLED | OUTPATIENT
Start: 2023-02-24

## 2023-02-17 RX ORDER — SODIUM CHLORIDE 9 MG/ML
INJECTION, SOLUTION INTRAVENOUS CONTINUOUS
Status: CANCELLED | OUTPATIENT
Start: 2023-02-24

## 2023-02-24 ENCOUNTER — HOSPITAL ENCOUNTER (OUTPATIENT)
Dept: INFUSION THERAPY | Age: 73
Setting detail: INFUSION SERIES
Discharge: HOME | End: 2023-02-24
Payer: MEDICARE

## 2023-02-24 VITALS
HEART RATE: 94 BPM | OXYGEN SATURATION: 97 % | WEIGHT: 212 LBS | BODY MASS INDEX: 37.55 KG/M2 | DIASTOLIC BLOOD PRESSURE: 66 MMHG | RESPIRATION RATE: 16 BRPM | SYSTOLIC BLOOD PRESSURE: 180 MMHG

## 2023-02-24 DIAGNOSIS — K51.911 ULCERATIVE COLITIS WITH RECTAL BLEEDING, UNSPECIFIED LOCATION (HCC): Primary | ICD-10-CM

## 2023-02-24 PROCEDURE — 96365 THER/PROPH/DIAG IV INF INIT: CPT

## 2023-02-24 PROCEDURE — 99211 OFF/OP EST MAY X REQ PHY/QHP: CPT

## 2023-02-24 PROCEDURE — 2580000003 HC RX 258: Performed by: INTERNAL MEDICINE

## 2023-02-24 PROCEDURE — 6360000002 HC RX W HCPCS: Performed by: INTERNAL MEDICINE

## 2023-02-24 PROCEDURE — 96366 THER/PROPH/DIAG IV INF ADDON: CPT

## 2023-02-24 RX ORDER — SODIUM CHLORIDE 9 MG/ML
INJECTION, SOLUTION INTRAVENOUS CONTINUOUS
OUTPATIENT
Start: 2023-04-21

## 2023-02-24 RX ORDER — DIPHENHYDRAMINE HYDROCHLORIDE 50 MG/ML
50 INJECTION INTRAMUSCULAR; INTRAVENOUS
OUTPATIENT
Start: 2023-04-21

## 2023-02-24 RX ORDER — ALBUTEROL SULFATE 90 UG/1
4 AEROSOL, METERED RESPIRATORY (INHALATION) PRN
OUTPATIENT
Start: 2023-04-21

## 2023-02-24 RX ORDER — SODIUM CHLORIDE 0.9 % (FLUSH) 0.9 %
5-40 SYRINGE (ML) INJECTION PRN
OUTPATIENT
Start: 2023-04-21

## 2023-02-24 RX ORDER — EPINEPHRINE 1 MG/ML
0.3 INJECTION, SOLUTION, CONCENTRATE INTRAVENOUS PRN
OUTPATIENT
Start: 2023-04-21

## 2023-02-24 RX ORDER — ACETAMINOPHEN 325 MG/1
650 TABLET ORAL
OUTPATIENT
Start: 2023-04-21

## 2023-02-24 RX ORDER — FAMOTIDINE 10 MG/ML
20 INJECTION, SOLUTION INTRAVENOUS
OUTPATIENT
Start: 2023-04-21

## 2023-02-24 RX ORDER — ONDANSETRON 2 MG/ML
8 INJECTION INTRAMUSCULAR; INTRAVENOUS
OUTPATIENT
Start: 2023-04-21

## 2023-02-24 RX ORDER — SODIUM CHLORIDE 0.9 % (FLUSH) 0.9 %
5-40 SYRINGE (ML) INJECTION PRN
Status: DISCONTINUED | OUTPATIENT
Start: 2023-02-24 | End: 2023-02-25 | Stop reason: HOSPADM

## 2023-02-24 RX ADMIN — SODIUM CHLORIDE, PRESERVATIVE FREE 10 ML: 5 INJECTION INTRAVENOUS at 08:33

## 2023-02-24 RX ADMIN — INFLIXIMAB 500 MG: 100 INJECTION, POWDER, LYOPHILIZED, FOR SOLUTION INTRAVENOUS at 09:48

## 2023-02-24 RX ADMIN — SODIUM CHLORIDE, PRESERVATIVE FREE 10 ML: 5 INJECTION INTRAVENOUS at 11:48

## 2023-02-27 RX ORDER — FLUTICASONE PROPIONATE 50 MCG
2 SPRAY, SUSPENSION (ML) NASAL DAILY
Qty: 1 EACH | Refills: 3 | Status: SHIPPED | OUTPATIENT
Start: 2023-02-27 | End: 2023-05-28

## 2023-03-13 RX ORDER — LEVOTHYROXINE SODIUM 112 UG/1
TABLET ORAL
Qty: 90 TABLET | Refills: 1 | Status: SHIPPED | OUTPATIENT
Start: 2023-03-13

## 2023-04-18 ENCOUNTER — HOSPITAL ENCOUNTER (OUTPATIENT)
Dept: INFUSION THERAPY | Age: 73
Setting detail: INFUSION SERIES
Discharge: HOME | End: 2023-04-18
Payer: MEDICARE

## 2023-04-18 VITALS
WEIGHT: 211 LBS | RESPIRATION RATE: 16 BRPM | TEMPERATURE: 98.4 F | DIASTOLIC BLOOD PRESSURE: 56 MMHG | BODY MASS INDEX: 37.38 KG/M2 | OXYGEN SATURATION: 97 % | SYSTOLIC BLOOD PRESSURE: 181 MMHG | HEART RATE: 84 BPM

## 2023-04-18 DIAGNOSIS — K51.911 ULCERATIVE COLITIS WITH RECTAL BLEEDING, UNSPECIFIED LOCATION (HCC): Primary | ICD-10-CM

## 2023-04-18 PROCEDURE — 6360000002 HC RX W HCPCS: Performed by: INTERNAL MEDICINE

## 2023-04-18 PROCEDURE — 96365 THER/PROPH/DIAG IV INF INIT: CPT

## 2023-04-18 PROCEDURE — 96366 THER/PROPH/DIAG IV INF ADDON: CPT

## 2023-04-18 PROCEDURE — 2580000003 HC RX 258: Performed by: INTERNAL MEDICINE

## 2023-04-18 PROCEDURE — 99211 OFF/OP EST MAY X REQ PHY/QHP: CPT

## 2023-04-18 RX ORDER — DIPHENHYDRAMINE HYDROCHLORIDE 50 MG/ML
50 INJECTION INTRAMUSCULAR; INTRAVENOUS
OUTPATIENT
Start: 2023-06-13

## 2023-04-18 RX ORDER — SODIUM CHLORIDE 9 MG/ML
INJECTION, SOLUTION INTRAVENOUS CONTINUOUS
Status: CANCELLED | OUTPATIENT
Start: 2023-06-13

## 2023-04-18 RX ORDER — FAMOTIDINE 10 MG/ML
20 INJECTION, SOLUTION INTRAVENOUS
OUTPATIENT
Start: 2023-06-13

## 2023-04-18 RX ORDER — SODIUM CHLORIDE 0.9 % (FLUSH) 0.9 %
5-40 SYRINGE (ML) INJECTION PRN
Status: DISCONTINUED | OUTPATIENT
Start: 2023-04-18 | End: 2023-04-19 | Stop reason: HOSPADM

## 2023-04-18 RX ORDER — ACETAMINOPHEN 325 MG/1
650 TABLET ORAL
OUTPATIENT
Start: 2023-06-13

## 2023-04-18 RX ORDER — ONDANSETRON 2 MG/ML
8 INJECTION INTRAMUSCULAR; INTRAVENOUS
OUTPATIENT
Start: 2023-06-13

## 2023-04-18 RX ORDER — SODIUM CHLORIDE 0.9 % (FLUSH) 0.9 %
5-40 SYRINGE (ML) INJECTION PRN
OUTPATIENT
Start: 2023-06-13

## 2023-04-18 RX ORDER — ALBUTEROL SULFATE 90 UG/1
4 AEROSOL, METERED RESPIRATORY (INHALATION) PRN
OUTPATIENT
Start: 2023-06-13

## 2023-04-18 RX ORDER — EPINEPHRINE 1 MG/ML
0.3 INJECTION, SOLUTION, CONCENTRATE INTRAVENOUS PRN
OUTPATIENT
Start: 2023-06-13

## 2023-04-18 RX ADMIN — SODIUM CHLORIDE, PRESERVATIVE FREE 10 ML: 5 INJECTION INTRAVENOUS at 09:12

## 2023-04-18 RX ADMIN — INFLIXIMAB 500 MG: 100 INJECTION, POWDER, LYOPHILIZED, FOR SOLUTION INTRAVENOUS at 09:17

## 2023-04-18 RX ADMIN — SODIUM CHLORIDE, PRESERVATIVE FREE 10 ML: 5 INJECTION INTRAVENOUS at 11:37

## 2023-05-10 ENCOUNTER — HOSPITAL ENCOUNTER (OUTPATIENT)
Dept: MRI IMAGING | Age: 73
Discharge: HOME | End: 2023-05-10
Payer: MEDICARE

## 2023-05-10 DIAGNOSIS — M17.11 ARTHRITIS OF RIGHT KNEE: ICD-10-CM

## 2023-05-10 DIAGNOSIS — M25.561 RIGHT KNEE PAIN, UNSPECIFIED CHRONICITY: ICD-10-CM

## 2023-05-10 DIAGNOSIS — R22.41 KNEE MASS, RIGHT: ICD-10-CM

## 2023-05-10 LAB
EGFR, POC: >60 ML/MIN/1.73M2
POC CREATININE: 0.7 MG/DL (ref 0.6–1.1)

## 2023-05-10 PROCEDURE — A9577 INJ MULTIHANCE: HCPCS | Performed by: ORTHOPAEDIC SURGERY

## 2023-05-10 PROCEDURE — 73723 MRI JOINT LWR EXTR W/O&W/DYE: CPT

## 2023-05-10 PROCEDURE — 82565 ASSAY OF CREATININE: CPT

## 2023-05-10 PROCEDURE — 6360000004 HC RX CONTRAST MEDICATION: Performed by: ORTHOPAEDIC SURGERY

## 2023-05-10 RX ADMIN — GADOBENATE DIMEGLUMINE 19 ML: 529 INJECTION, SOLUTION INTRAVENOUS at 13:58

## 2023-05-22 RX ORDER — FLUOXETINE HYDROCHLORIDE 20 MG/1
CAPSULE ORAL
Qty: 30 CAPSULE | Refills: 5 | Status: SHIPPED | OUTPATIENT
Start: 2023-05-22

## 2023-06-09 ENCOUNTER — HOSPITAL ENCOUNTER (OUTPATIENT)
Dept: INFUSION THERAPY | Age: 73
Setting detail: INFUSION SERIES
Discharge: HOME | End: 2023-06-09
Payer: MEDICARE

## 2023-06-09 VITALS
HEART RATE: 84 BPM | SYSTOLIC BLOOD PRESSURE: 146 MMHG | WEIGHT: 212 LBS | RESPIRATION RATE: 16 BRPM | TEMPERATURE: 98 F | BODY MASS INDEX: 37.55 KG/M2 | DIASTOLIC BLOOD PRESSURE: 48 MMHG | OXYGEN SATURATION: 97 %

## 2023-06-09 DIAGNOSIS — K51.911 ULCERATIVE COLITIS WITH RECTAL BLEEDING, UNSPECIFIED LOCATION (HCC): Primary | ICD-10-CM

## 2023-06-09 PROCEDURE — 6360000002 HC RX W HCPCS: Performed by: INTERNAL MEDICINE

## 2023-06-09 PROCEDURE — 96366 THER/PROPH/DIAG IV INF ADDON: CPT

## 2023-06-09 PROCEDURE — 2580000003 HC RX 258: Performed by: INTERNAL MEDICINE

## 2023-06-09 PROCEDURE — 96365 THER/PROPH/DIAG IV INF INIT: CPT

## 2023-06-09 RX ORDER — ONDANSETRON 2 MG/ML
8 INJECTION INTRAMUSCULAR; INTRAVENOUS
OUTPATIENT
Start: 2023-06-16

## 2023-06-09 RX ORDER — EPINEPHRINE 1 MG/ML
0.3 INJECTION, SOLUTION, CONCENTRATE INTRAVENOUS PRN
OUTPATIENT
Start: 2023-06-16

## 2023-06-09 RX ORDER — FAMOTIDINE 10 MG/ML
20 INJECTION, SOLUTION INTRAVENOUS
OUTPATIENT
Start: 2023-06-16

## 2023-06-09 RX ORDER — DIPHENHYDRAMINE HYDROCHLORIDE 50 MG/ML
50 INJECTION INTRAMUSCULAR; INTRAVENOUS
OUTPATIENT
Start: 2023-06-16

## 2023-06-09 RX ORDER — ACETAMINOPHEN 325 MG/1
650 TABLET ORAL
OUTPATIENT
Start: 2023-06-16

## 2023-06-09 RX ORDER — SODIUM CHLORIDE 0.9 % (FLUSH) 0.9 %
5-40 SYRINGE (ML) INJECTION PRN
OUTPATIENT
Start: 2023-06-16

## 2023-06-09 RX ORDER — ALBUTEROL SULFATE 90 UG/1
4 AEROSOL, METERED RESPIRATORY (INHALATION) PRN
OUTPATIENT
Start: 2023-06-16

## 2023-06-09 RX ORDER — SODIUM CHLORIDE 0.9 % (FLUSH) 0.9 %
5-40 SYRINGE (ML) INJECTION PRN
Status: DISCONTINUED | OUTPATIENT
Start: 2023-06-09 | End: 2023-06-10 | Stop reason: HOSPADM

## 2023-06-09 RX ADMIN — SODIUM CHLORIDE, PRESERVATIVE FREE 10 ML: 5 INJECTION INTRAVENOUS at 11:14

## 2023-06-09 RX ADMIN — INFLIXIMAB 500 MG: 100 INJECTION, POWDER, LYOPHILIZED, FOR SOLUTION INTRAVENOUS at 09:12

## 2023-06-09 RX ADMIN — SODIUM CHLORIDE, PRESERVATIVE FREE 10 ML: 5 INJECTION INTRAVENOUS at 08:35

## 2023-06-09 NOTE — PROGRESS NOTES
Prior to discharge, the After Visit Summary Discharge Instructions were reviewed with the patient. She was offered a printed version of the AVS, but declined the offer. Recurrent appointment, pt tolerated infusion well. Pt discharged HOME. Pt to exit via STEADY GAIT.     Orders Placed This Encounter   Medications    inFLIXimab-abda (RENFLEXIS) 500 mg in sodium chloride 0.9 % 250 mL IVPB    sodium chloride flush 0.9 % injection 5-40 mL

## 2023-07-17 ENCOUNTER — HOSPITAL ENCOUNTER (OUTPATIENT)
Age: 73
Discharge: HOME | End: 2023-07-17
Payer: MEDICARE

## 2023-07-17 DIAGNOSIS — I10 ESSENTIAL HYPERTENSION: ICD-10-CM

## 2023-07-17 DIAGNOSIS — E78.49 OTHER HYPERLIPIDEMIA: ICD-10-CM

## 2023-07-17 DIAGNOSIS — R73.03 PREDIABETES: ICD-10-CM

## 2023-07-17 DIAGNOSIS — E03.9 HYPOTHYROIDISM, UNSPECIFIED TYPE: ICD-10-CM

## 2023-07-17 LAB
ALBUMIN SERPL-MCNC: 4.7 GM/DL (ref 3.4–5)
ALP BLD-CCNC: 63 IU/L (ref 40–128)
ALT SERPL-CCNC: 25 U/L (ref 10–40)
ANION GAP SERPL CALCULATED.3IONS-SCNC: 13 MMOL/L (ref 4–16)
ANION GAP SERPL CALCULATED.3IONS-SCNC: 16 MMOL/L (ref 4–16)
AST SERPL-CCNC: 25 IU/L (ref 15–37)
BASOPHILS ABSOLUTE: 0.1 K/CU MM
BASOPHILS ABSOLUTE: 0.1 K/CU MM
BASOPHILS RELATIVE PERCENT: 1.4 % (ref 0–1)
BASOPHILS RELATIVE PERCENT: 1.4 % (ref 0–1)
BILIRUB SERPL-MCNC: 0.6 MG/DL (ref 0–1)
BUN SERPL-MCNC: 12 MG/DL (ref 6–23)
BUN SERPL-MCNC: 12 MG/DL (ref 6–23)
CALCIUM SERPL-MCNC: 9.4 MG/DL (ref 8.3–10.6)
CALCIUM SERPL-MCNC: 9.4 MG/DL (ref 8.3–10.6)
CHLORIDE BLD-SCNC: 97 MMOL/L (ref 99–110)
CHLORIDE BLD-SCNC: 98 MMOL/L (ref 99–110)
CHOLEST SERPL-MCNC: 154 MG/DL
CO2: 24 MMOL/L (ref 21–32)
CO2: 26 MMOL/L (ref 21–32)
CREAT SERPL-MCNC: 0.7 MG/DL (ref 0.6–1.1)
CREAT SERPL-MCNC: 0.7 MG/DL (ref 0.6–1.1)
DIFFERENTIAL TYPE: ABNORMAL
DIFFERENTIAL TYPE: ABNORMAL
EOSINOPHILS ABSOLUTE: 0.2 K/CU MM
EOSINOPHILS ABSOLUTE: 0.2 K/CU MM
EOSINOPHILS RELATIVE PERCENT: 3.1 % (ref 0–3)
EOSINOPHILS RELATIVE PERCENT: 3.9 % (ref 0–3)
ERYTHROCYTE SEDIMENTATION RATE: 9 MM/HR (ref 0–30)
ESTIMATED AVERAGE GLUCOSE: 120 MG/DL
GFR SERPL CREATININE-BSD FRML MDRD: >60 ML/MIN/1.73M2
GFR SERPL CREATININE-BSD FRML MDRD: >60 ML/MIN/1.73M2
GLUCOSE SERPL-MCNC: 94 MG/DL (ref 70–99)
GLUCOSE SERPL-MCNC: 95 MG/DL (ref 70–99)
HBA1C MFR BLD: 5.8 % (ref 4.2–6.3)
HCT VFR BLD CALC: 41.1 % (ref 37–47)
HCT VFR BLD CALC: 41.2 % (ref 37–47)
HDLC SERPL-MCNC: 62 MG/DL
HEMOGLOBIN: 13.2 GM/DL (ref 12.5–16)
HEMOGLOBIN: 13.3 GM/DL (ref 12.5–16)
HIGH SENSITIVE C-REACTIVE PROTEIN: 1 MG/L (ref 0–5)
IMMATURE NEUTROPHIL %: 0.4 % (ref 0–0.43)
IMMATURE NEUTROPHIL %: 0.4 % (ref 0–0.43)
LDLC SERPL CALC-MCNC: 51 MG/DL
LYMPHOCYTES ABSOLUTE: 1.5 K/CU MM
LYMPHOCYTES ABSOLUTE: 1.6 K/CU MM
LYMPHOCYTES RELATIVE PERCENT: 31.3 % (ref 24–44)
LYMPHOCYTES RELATIVE PERCENT: 33.4 % (ref 24–44)
MCH RBC QN AUTO: 29.7 PG (ref 27–31)
MCH RBC QN AUTO: 30 PG (ref 27–31)
MCHC RBC AUTO-ENTMCNC: 32 % (ref 32–36)
MCHC RBC AUTO-ENTMCNC: 32.4 % (ref 32–36)
MCV RBC AUTO: 92.6 FL (ref 78–100)
MCV RBC AUTO: 92.8 FL (ref 78–100)
MONOCYTES ABSOLUTE: 0.7 K/CU MM
MONOCYTES ABSOLUTE: 0.8 K/CU MM
MONOCYTES RELATIVE PERCENT: 15.1 % (ref 0–4)
MONOCYTES RELATIVE PERCENT: 16 % (ref 0–4)
NUCLEATED RBC %: 0 %
NUCLEATED RBC %: 0 %
PDW BLD-RTO: 13.1 % (ref 11.7–14.9)
PDW BLD-RTO: 13.2 % (ref 11.7–14.9)
PLATELET # BLD: 275 K/CU MM (ref 140–440)
PLATELET # BLD: 280 K/CU MM (ref 140–440)
PMV BLD AUTO: 9.3 FL (ref 7.5–11.1)
PMV BLD AUTO: 9.3 FL (ref 7.5–11.1)
POTASSIUM SERPL-SCNC: 4.3 MMOL/L (ref 3.5–5.1)
POTASSIUM SERPL-SCNC: 4.3 MMOL/L (ref 3.5–5.1)
RBC # BLD: 4.44 M/CU MM (ref 4.2–5.4)
RBC # BLD: 4.44 M/CU MM (ref 4.2–5.4)
SEGMENTED NEUTROPHILS ABSOLUTE COUNT: 2.3 K/CU MM
SEGMENTED NEUTROPHILS ABSOLUTE COUNT: 2.3 K/CU MM
SEGMENTED NEUTROPHILS RELATIVE PERCENT: 45.8 % (ref 36–66)
SEGMENTED NEUTROPHILS RELATIVE PERCENT: 47.8 % (ref 36–66)
SODIUM BLD-SCNC: 137 MMOL/L (ref 135–145)
SODIUM BLD-SCNC: 137 MMOL/L (ref 135–145)
TOTAL IMMATURE NEUTOROPHIL: 0.02 K/CU MM
TOTAL IMMATURE NEUTOROPHIL: 0.02 K/CU MM
TOTAL NUCLEATED RBC: 0 K/CU MM
TOTAL NUCLEATED RBC: 0 K/CU MM
TOTAL PROTEIN: 7.1 GM/DL (ref 6.4–8.2)
TRIGL SERPL-MCNC: 203 MG/DL
TSH SERPL DL<=0.005 MIU/L-ACNC: 1.74 UIU/ML (ref 0.27–4.2)
WBC # BLD: 4.9 K/CU MM (ref 4–10.5)
WBC # BLD: 4.9 K/CU MM (ref 4–10.5)

## 2023-07-17 PROCEDURE — 86141 C-REACTIVE PROTEIN HS: CPT

## 2023-07-17 PROCEDURE — 82652 VIT D 1 25-DIHYDROXY: CPT

## 2023-07-17 PROCEDURE — 85652 RBC SED RATE AUTOMATED: CPT

## 2023-07-17 PROCEDURE — 36415 COLL VENOUS BLD VENIPUNCTURE: CPT

## 2023-07-17 PROCEDURE — 80053 COMPREHEN METABOLIC PANEL: CPT

## 2023-07-17 PROCEDURE — 85025 COMPLETE CBC W/AUTO DIFF WBC: CPT

## 2023-07-17 PROCEDURE — 80061 LIPID PANEL: CPT

## 2023-07-17 PROCEDURE — 86480 TB TEST CELL IMMUN MEASURE: CPT

## 2023-07-17 PROCEDURE — 83993 ASSAY FOR CALPROTECTIN FECAL: CPT

## 2023-07-17 PROCEDURE — 80048 BASIC METABOLIC PNL TOTAL CA: CPT

## 2023-07-17 PROCEDURE — 84443 ASSAY THYROID STIM HORMONE: CPT

## 2023-07-17 PROCEDURE — 83036 HEMOGLOBIN GLYCOSYLATED A1C: CPT

## 2023-07-19 LAB
1,25(OH)2D3 SERPL-MCNC: 63.5 PG/ML (ref 19.9–79.3)
QUANTI TB1 MINUS NIL: 0.02 IU/ML (ref 0–0.34)
QUANTI TB2 MINUS NIL: 0.01 IU/ML (ref 0–0.34)
QUANTIFERON (R) TB GOLD (INCUBATED): NEGATIVE IU/ML
QUANTIFERON MITOGEN MINUS NIL: >10 IU/ML
QUANTIFERON NIL: 0.01 IU/ML

## 2023-07-20 LAB — CALPROTECTIN STL-MCNT: 6 UG/G

## 2023-07-22 SDOH — ECONOMIC STABILITY: INCOME INSECURITY: HOW HARD IS IT FOR YOU TO PAY FOR THE VERY BASICS LIKE FOOD, HOUSING, MEDICAL CARE, AND HEATING?: NOT HARD AT ALL

## 2023-07-22 SDOH — ECONOMIC STABILITY: FOOD INSECURITY: WITHIN THE PAST 12 MONTHS, YOU WORRIED THAT YOUR FOOD WOULD RUN OUT BEFORE YOU GOT MONEY TO BUY MORE.: NEVER TRUE

## 2023-07-22 SDOH — ECONOMIC STABILITY: TRANSPORTATION INSECURITY
IN THE PAST 12 MONTHS, HAS LACK OF TRANSPORTATION KEPT YOU FROM MEETINGS, WORK, OR FROM GETTING THINGS NEEDED FOR DAILY LIVING?: NO

## 2023-07-22 SDOH — ECONOMIC STABILITY: HOUSING INSECURITY
IN THE LAST 12 MONTHS, WAS THERE A TIME WHEN YOU DID NOT HAVE A STEADY PLACE TO SLEEP OR SLEPT IN A SHELTER (INCLUDING NOW)?: NO

## 2023-07-22 SDOH — ECONOMIC STABILITY: FOOD INSECURITY: WITHIN THE PAST 12 MONTHS, THE FOOD YOU BOUGHT JUST DIDN'T LAST AND YOU DIDN'T HAVE MONEY TO GET MORE.: NEVER TRUE

## 2023-07-24 ENCOUNTER — OFFICE VISIT (OUTPATIENT)
Dept: INTERNAL MEDICINE CLINIC | Age: 73
End: 2023-07-24
Payer: MEDICARE

## 2023-07-24 VITALS
SYSTOLIC BLOOD PRESSURE: 132 MMHG | OXYGEN SATURATION: 96 % | DIASTOLIC BLOOD PRESSURE: 70 MMHG | HEART RATE: 70 BPM | BODY MASS INDEX: 38.19 KG/M2 | WEIGHT: 215.6 LBS

## 2023-07-24 DIAGNOSIS — I10 PRIMARY HYPERTENSION: Primary | ICD-10-CM

## 2023-07-24 DIAGNOSIS — E55.9 VITAMIN D DEFICIENCY: ICD-10-CM

## 2023-07-24 DIAGNOSIS — E03.9 HYPOTHYROIDISM, UNSPECIFIED TYPE: ICD-10-CM

## 2023-07-24 DIAGNOSIS — F41.9 ANXIETY AND DEPRESSION: ICD-10-CM

## 2023-07-24 DIAGNOSIS — J45.40 MODERATE PERSISTENT ASTHMA WITHOUT COMPLICATION: ICD-10-CM

## 2023-07-24 DIAGNOSIS — E78.49 OTHER HYPERLIPIDEMIA: ICD-10-CM

## 2023-07-24 DIAGNOSIS — F32.A ANXIETY AND DEPRESSION: ICD-10-CM

## 2023-07-24 DIAGNOSIS — R73.03 PREDIABETES: ICD-10-CM

## 2023-07-24 PROCEDURE — G8427 DOCREV CUR MEDS BY ELIG CLIN: HCPCS | Performed by: FAMILY MEDICINE

## 2023-07-24 PROCEDURE — 1036F TOBACCO NON-USER: CPT | Performed by: FAMILY MEDICINE

## 2023-07-24 PROCEDURE — 1123F ACP DISCUSS/DSCN MKR DOCD: CPT | Performed by: FAMILY MEDICINE

## 2023-07-24 PROCEDURE — G8417 CALC BMI ABV UP PARAM F/U: HCPCS | Performed by: FAMILY MEDICINE

## 2023-07-24 PROCEDURE — 3078F DIAST BP <80 MM HG: CPT | Performed by: FAMILY MEDICINE

## 2023-07-24 PROCEDURE — 3075F SYST BP GE 130 - 139MM HG: CPT | Performed by: FAMILY MEDICINE

## 2023-07-24 PROCEDURE — G8399 PT W/DXA RESULTS DOCUMENT: HCPCS | Performed by: FAMILY MEDICINE

## 2023-07-24 PROCEDURE — 1090F PRES/ABSN URINE INCON ASSESS: CPT | Performed by: FAMILY MEDICINE

## 2023-07-24 PROCEDURE — 99214 OFFICE O/P EST MOD 30 MIN: CPT | Performed by: FAMILY MEDICINE

## 2023-07-24 PROCEDURE — 3017F COLORECTAL CA SCREEN DOC REV: CPT | Performed by: FAMILY MEDICINE

## 2023-07-24 RX ORDER — FLUOXETINE HYDROCHLORIDE 20 MG/1
CAPSULE ORAL
Qty: 90 CAPSULE | Refills: 1 | Status: SHIPPED | OUTPATIENT
Start: 2023-07-24

## 2023-07-24 RX ORDER — LEVOTHYROXINE SODIUM 112 UG/1
TABLET ORAL
Qty: 90 TABLET | Refills: 1 | Status: SHIPPED | OUTPATIENT
Start: 2023-07-24

## 2023-07-24 ASSESSMENT — ENCOUNTER SYMPTOMS
VOMITING: 0
CHEST TIGHTNESS: 0
ABDOMINAL PAIN: 0
CONSTIPATION: 0
SHORTNESS OF BREATH: 0
SORE THROAT: 0
COLOR CHANGE: 0
DIARRHEA: 0

## 2023-07-24 NOTE — PROGRESS NOTES
Subjective:      Chief Complaint   Patient presents with    6 Month Follow-Up       HPI:  Josr Sol is a 74 y.o. female who presents today for follow up of chronic conditions as listed below. Has had lipoma removed by R knee about a month ago. States it was a more complicated lipoma and they could not remove it easily. Recovery was complicated by infections. Is still following up with surgery, but symptoms are slowly improving. Depression:  Symptoms still doing well with medication. Hypothyroidism:  Denying any symptoms. Prediabetes:  Has not been able to be as active since her surgery, but was previously doing water aerobics with her . Otherwise feeling well today, no acute concerns. Labs reviewed. Past Medical History:   Diagnosis Date    Anxiety and depression     Asthma     Colitis, ulcerative ([address])     Jordin Omer    Hyperlipidemia     Hypertension     Hypothyroidism     Mild aortic stenosis 2018, echo    Obesity, Class III, BMI 40-49.9 (morbid obesity) ([address])     Osteopenia 2017    Prediabetes     Rib fracture 2014    8 th right side from syncope    Ulcerative colitis with rectal bleeding ([address]) 2021        Past Surgical History:   Procedure Laterality Date    BREAST BIOPSY Left     twice; [place]    CARPAL TUNNEL RELEASE Right      SECTION      one    HYSTERECTOMY (CERVIX STATUS UNKNOWN)      HYSTERECTOMY, TOTAL ABDOMINAL (CERVIX REMOVED)      MARY BETH AND BSO (CERVIX REMOVED)      TONSILLECTOMY AND ADENOIDECTOMY      age 8    WRIST GANGLION EXCISION Left        Social History     Tobacco Use    Smoking status: Never    Smokeless tobacco: Never   Substance Use Topics    Alcohol use: Yes     Comment: small glass of wine or 12 oz beer maybe once a month        Review of Systems   Constitutional:  Negative for activity change, appetite change, chills, fever and unexpected weight change.    HENT:  Negative for congestion and sore

## 2023-08-01 ENCOUNTER — HOSPITAL ENCOUNTER (OUTPATIENT)
Dept: INFUSION THERAPY | Age: 73
Setting detail: INFUSION SERIES
Discharge: HOME | End: 2023-08-01
Payer: MEDICARE

## 2023-08-01 VITALS
RESPIRATION RATE: 16 BRPM | WEIGHT: 210 LBS | OXYGEN SATURATION: 97 % | BODY MASS INDEX: 37.2 KG/M2 | DIASTOLIC BLOOD PRESSURE: 62 MMHG | SYSTOLIC BLOOD PRESSURE: 130 MMHG | HEART RATE: 68 BPM | TEMPERATURE: 97.2 F

## 2023-08-01 DIAGNOSIS — K51.911 ULCERATIVE COLITIS WITH RECTAL BLEEDING, UNSPECIFIED LOCATION (HCC): Primary | ICD-10-CM

## 2023-08-01 PROCEDURE — 6360000002 HC RX W HCPCS: Performed by: INTERNAL MEDICINE

## 2023-08-01 PROCEDURE — 96365 THER/PROPH/DIAG IV INF INIT: CPT

## 2023-08-01 PROCEDURE — 2580000003 HC RX 258: Performed by: INTERNAL MEDICINE

## 2023-08-01 PROCEDURE — 96366 THER/PROPH/DIAG IV INF ADDON: CPT

## 2023-08-01 PROCEDURE — 96413 CHEMO IV INFUSION 1 HR: CPT

## 2023-08-01 PROCEDURE — 96415 CHEMO IV INFUSION ADDL HR: CPT

## 2023-08-01 RX ORDER — SODIUM CHLORIDE 0.9 % (FLUSH) 0.9 %
5-40 SYRINGE (ML) INJECTION PRN
OUTPATIENT
Start: 2023-09-26

## 2023-08-01 RX ORDER — FAMOTIDINE 10 MG/ML
20 INJECTION, SOLUTION INTRAVENOUS
OUTPATIENT
Start: 2023-09-26

## 2023-08-01 RX ORDER — SODIUM CHLORIDE 0.9 % (FLUSH) 0.9 %
5-40 SYRINGE (ML) INJECTION PRN
Status: DISCONTINUED | OUTPATIENT
Start: 2023-08-01 | End: 2023-08-02 | Stop reason: HOSPADM

## 2023-08-01 RX ORDER — ACETAMINOPHEN 325 MG/1
650 TABLET ORAL
OUTPATIENT
Start: 2023-09-26

## 2023-08-01 RX ORDER — ONDANSETRON 2 MG/ML
8 INJECTION INTRAMUSCULAR; INTRAVENOUS
OUTPATIENT
Start: 2023-09-26

## 2023-08-01 RX ORDER — EPINEPHRINE 1 MG/ML
0.3 INJECTION, SOLUTION, CONCENTRATE INTRAVENOUS PRN
OUTPATIENT
Start: 2023-09-26

## 2023-08-01 RX ORDER — ALBUTEROL SULFATE 90 UG/1
4 AEROSOL, METERED RESPIRATORY (INHALATION) PRN
OUTPATIENT
Start: 2023-09-26

## 2023-08-01 RX ORDER — DIPHENHYDRAMINE HYDROCHLORIDE 50 MG/ML
50 INJECTION INTRAMUSCULAR; INTRAVENOUS
OUTPATIENT
Start: 2023-09-26

## 2023-08-01 RX ADMIN — INFLIXIMAB 500 MG: 100 INJECTION, POWDER, LYOPHILIZED, FOR SOLUTION INTRAVENOUS at 09:38

## 2023-08-01 RX ADMIN — SODIUM CHLORIDE, PRESERVATIVE FREE 10 ML: 5 INJECTION INTRAVENOUS at 11:45

## 2023-08-01 RX ADMIN — SODIUM CHLORIDE, PRESERVATIVE FREE 10 ML: 5 INJECTION INTRAVENOUS at 09:15

## 2023-09-11 DIAGNOSIS — E03.9 HYPOTHYROIDISM, UNSPECIFIED TYPE: ICD-10-CM

## 2023-09-11 RX ORDER — LEVOTHYROXINE SODIUM 112 UG/1
TABLET ORAL
Qty: 90 TABLET | Refills: 1 | Status: SHIPPED | OUTPATIENT
Start: 2023-09-11

## 2023-09-15 ENCOUNTER — HOSPITAL ENCOUNTER (OUTPATIENT)
Dept: WOMENS IMAGING | Age: 73
Discharge: HOME | End: 2023-09-15
Payer: MEDICARE

## 2023-09-15 VITALS — HEIGHT: 62 IN | WEIGHT: 206 LBS | BODY MASS INDEX: 37.91 KG/M2

## 2023-09-15 DIAGNOSIS — Z12.31 ENCOUNTER FOR SCREENING MAMMOGRAM FOR MALIGNANT NEOPLASM OF BREAST: ICD-10-CM

## 2023-09-15 PROCEDURE — 77063 BREAST TOMOSYNTHESIS BI: CPT

## 2023-09-28 ENCOUNTER — HOSPITAL ENCOUNTER (OUTPATIENT)
Dept: INFUSION THERAPY | Age: 73
Setting detail: INFUSION SERIES
Discharge: HOME | End: 2023-09-28
Payer: MEDICARE

## 2023-09-28 VITALS
OXYGEN SATURATION: 95 % | HEART RATE: 72 BPM | DIASTOLIC BLOOD PRESSURE: 58 MMHG | HEIGHT: 62 IN | BODY MASS INDEX: 37.91 KG/M2 | WEIGHT: 206 LBS | RESPIRATION RATE: 16 BRPM | TEMPERATURE: 97.2 F | SYSTOLIC BLOOD PRESSURE: 116 MMHG

## 2023-09-28 DIAGNOSIS — K51.911 ULCERATIVE COLITIS WITH RECTAL BLEEDING, UNSPECIFIED LOCATION (HCC): Primary | ICD-10-CM

## 2023-09-28 PROCEDURE — 96366 THER/PROPH/DIAG IV INF ADDON: CPT

## 2023-09-28 PROCEDURE — 2580000003 HC RX 258: Performed by: INTERNAL MEDICINE

## 2023-09-28 PROCEDURE — 6360000002 HC RX W HCPCS: Performed by: INTERNAL MEDICINE

## 2023-09-28 PROCEDURE — 96365 THER/PROPH/DIAG IV INF INIT: CPT

## 2023-09-28 RX ORDER — ONDANSETRON 2 MG/ML
8 INJECTION INTRAMUSCULAR; INTRAVENOUS
OUTPATIENT
Start: 2023-11-23

## 2023-09-28 RX ORDER — DIPHENHYDRAMINE HYDROCHLORIDE 50 MG/ML
50 INJECTION INTRAMUSCULAR; INTRAVENOUS
OUTPATIENT
Start: 2023-11-23

## 2023-09-28 RX ORDER — SODIUM CHLORIDE 0.9 % (FLUSH) 0.9 %
5-40 SYRINGE (ML) INJECTION PRN
OUTPATIENT
Start: 2023-11-23

## 2023-09-28 RX ORDER — ACETAMINOPHEN 325 MG/1
650 TABLET ORAL
OUTPATIENT
Start: 2023-11-23

## 2023-09-28 RX ORDER — EPINEPHRINE 1 MG/ML
0.3 INJECTION, SOLUTION, CONCENTRATE INTRAVENOUS PRN
OUTPATIENT
Start: 2023-11-23

## 2023-09-28 RX ORDER — ALBUTEROL SULFATE 90 UG/1
4 AEROSOL, METERED RESPIRATORY (INHALATION) PRN
OUTPATIENT
Start: 2023-11-23

## 2023-09-28 RX ORDER — FAMOTIDINE 10 MG/ML
20 INJECTION, SOLUTION INTRAVENOUS
OUTPATIENT
Start: 2023-11-23

## 2023-09-28 RX ORDER — SODIUM CHLORIDE 0.9 % (FLUSH) 0.9 %
5-40 SYRINGE (ML) INJECTION PRN
Status: DISCONTINUED | OUTPATIENT
Start: 2023-09-28 | End: 2023-09-29 | Stop reason: HOSPADM

## 2023-09-28 RX ADMIN — SODIUM CHLORIDE, PRESERVATIVE FREE 10 ML: 5 INJECTION INTRAVENOUS at 08:53

## 2023-09-28 RX ADMIN — INFLIXIMAB 500 MG: 100 INJECTION, POWDER, LYOPHILIZED, FOR SOLUTION INTRAVENOUS at 09:05

## 2023-09-28 RX ADMIN — SODIUM CHLORIDE, PRESERVATIVE FREE 10 ML: 5 INJECTION INTRAVENOUS at 11:15

## 2023-09-28 NOTE — PLAN OF CARE
Ambulatory to unit room 6 for Renflexis. Reorientated to unit. Procedure and plan of care explained. Questions answered. Understanding verbalized.

## 2023-09-28 NOTE — DISCHARGE SUMMARY
Tolerated Infusion well. Reviewed discharge instructions, understanding verbalized. Copies of AVS given to take home. Patient discharged home. Down to exit per self.     Orders Placed This Encounter   Medications    inFLIXimab-abda (RENFLEXIS) 500 mg in sodium chloride 0.9 % 250 mL IVPB    sodium chloride flush 0.9 % injection 5-40 mL

## 2023-09-28 NOTE — DISCHARGE INSTRUCTIONS
Continue same medications, diet, and activity. Seek medical help for signs and symptoms of an allergic reaction; hives, chills, rash,chest pain, difficulty breathing, or swelling of face and/or throat. Talk to your Doctor before receiving any vaccinations or starting any new medicines. Thank you for choosing [facility]. It is our pleasure to serve you.         [facility]   [phone]  8AM-5PM none

## 2023-11-22 ENCOUNTER — HOSPITAL ENCOUNTER (OUTPATIENT)
Dept: INFUSION THERAPY | Age: 73
Setting detail: INFUSION SERIES
Discharge: HOME | End: 2023-11-22
Payer: MEDICARE

## 2023-11-22 VITALS
BODY MASS INDEX: 38.78 KG/M2 | OXYGEN SATURATION: 96 % | RESPIRATION RATE: 16 BRPM | HEART RATE: 83 BPM | TEMPERATURE: 97 F | SYSTOLIC BLOOD PRESSURE: 133 MMHG | DIASTOLIC BLOOD PRESSURE: 42 MMHG | WEIGHT: 212 LBS

## 2023-11-22 DIAGNOSIS — K51.911 ULCERATIVE COLITIS WITH RECTAL BLEEDING, UNSPECIFIED LOCATION (HCC): Primary | ICD-10-CM

## 2023-11-22 PROCEDURE — 6360000002 HC RX W HCPCS: Performed by: INTERNAL MEDICINE

## 2023-11-22 PROCEDURE — 2580000003 HC RX 258: Performed by: INTERNAL MEDICINE

## 2023-11-22 PROCEDURE — 96366 THER/PROPH/DIAG IV INF ADDON: CPT

## 2023-11-22 PROCEDURE — 96365 THER/PROPH/DIAG IV INF INIT: CPT

## 2023-11-22 RX ORDER — DIPHENHYDRAMINE HYDROCHLORIDE 50 MG/ML
50 INJECTION INTRAMUSCULAR; INTRAVENOUS
OUTPATIENT
Start: 2024-01-17

## 2023-11-22 RX ORDER — SODIUM CHLORIDE 0.9 % (FLUSH) 0.9 %
5-40 SYRINGE (ML) INJECTION PRN
Status: DISCONTINUED | OUTPATIENT
Start: 2023-11-22 | End: 2023-11-23 | Stop reason: HOSPADM

## 2023-11-22 RX ORDER — FAMOTIDINE 10 MG/ML
20 INJECTION, SOLUTION INTRAVENOUS
OUTPATIENT
Start: 2024-01-17

## 2023-11-22 RX ORDER — ALBUTEROL SULFATE 90 UG/1
4 AEROSOL, METERED RESPIRATORY (INHALATION) PRN
OUTPATIENT
Start: 2024-01-17

## 2023-11-22 RX ORDER — ACETAMINOPHEN 325 MG/1
650 TABLET ORAL
OUTPATIENT
Start: 2024-01-17

## 2023-11-22 RX ORDER — SODIUM CHLORIDE 0.9 % (FLUSH) 0.9 %
5-40 SYRINGE (ML) INJECTION PRN
OUTPATIENT
Start: 2024-01-17

## 2023-11-22 RX ORDER — ONDANSETRON 2 MG/ML
8 INJECTION INTRAMUSCULAR; INTRAVENOUS
OUTPATIENT
Start: 2024-01-17

## 2023-11-22 RX ORDER — EPINEPHRINE 1 MG/ML
0.3 INJECTION, SOLUTION, CONCENTRATE INTRAVENOUS PRN
OUTPATIENT
Start: 2024-01-17

## 2023-11-22 RX ADMIN — SODIUM CHLORIDE, PRESERVATIVE FREE 10 ML: 5 INJECTION INTRAVENOUS at 11:50

## 2023-11-22 RX ADMIN — SODIUM CHLORIDE, PRESERVATIVE FREE 10 ML: 5 INJECTION INTRAVENOUS at 08:40

## 2023-11-22 RX ADMIN — INFLIXIMAB 500 MG: 100 INJECTION, POWDER, LYOPHILIZED, FOR SOLUTION INTRAVENOUS at 09:39

## 2023-11-22 NOTE — PROGRESS NOTES
Tolerated infusion well. Reviewed discharge instruction, voiced understanding. Copies of AVS given. Pt discharged home. Pt to exit via steady gait.     Orders Placed This Encounter   Medications    inFLIXimab-abda (RENFLEXIS) 500 mg in sodium chloride 0.9 % 250 mL IVPB    sodium chloride flush 0.9 % injection 5-40 mL

## 2023-11-27 RX ORDER — LOSARTAN POTASSIUM 25 MG/1
25 TABLET ORAL NIGHTLY
Qty: 90 TABLET | Refills: 3 | Status: SHIPPED | OUTPATIENT
Start: 2023-11-27

## 2024-01-02 RX ORDER — HYDROCHLOROTHIAZIDE 12.5 MG/1
12.5 CAPSULE, GELATIN COATED ORAL EVERY MORNING
Qty: 90 CAPSULE | Refills: 3 | Status: SHIPPED | OUTPATIENT
Start: 2024-01-02

## 2024-01-16 ENCOUNTER — HOSPITAL ENCOUNTER (OUTPATIENT)
Age: 74
Discharge: HOME | End: 2024-01-16
Payer: MEDICARE

## 2024-01-16 DIAGNOSIS — R73.03 PREDIABETES: ICD-10-CM

## 2024-01-16 DIAGNOSIS — E55.9 VITAMIN D DEFICIENCY: ICD-10-CM

## 2024-01-16 DIAGNOSIS — I10 PRIMARY HYPERTENSION: ICD-10-CM

## 2024-01-16 DIAGNOSIS — E03.9 HYPOTHYROIDISM, UNSPECIFIED TYPE: ICD-10-CM

## 2024-01-16 DIAGNOSIS — E78.49 OTHER HYPERLIPIDEMIA: ICD-10-CM

## 2024-01-16 LAB
25(OH)D3 SERPL-MCNC: 24.9 NG/ML
ALBUMIN SERPL-MCNC: 4.5 GM/DL (ref 3.4–5)
ALP BLD-CCNC: 57 IU/L (ref 40–128)
ALT SERPL-CCNC: 25 U/L (ref 10–40)
ANION GAP SERPL CALCULATED.3IONS-SCNC: 14 MMOL/L (ref 7–16)
AST SERPL-CCNC: 23 IU/L (ref 15–37)
BASOPHILS ABSOLUTE: 0.1 K/CU MM
BASOPHILS RELATIVE PERCENT: 1.6 % (ref 0–1)
BILIRUB SERPL-MCNC: 0.5 MG/DL (ref 0–1)
BUN SERPL-MCNC: 12 MG/DL (ref 6–23)
CALCIUM SERPL-MCNC: 9.1 MG/DL (ref 8.3–10.6)
CHLORIDE BLD-SCNC: 98 MMOL/L (ref 99–110)
CHOLEST SERPL-MCNC: 148 MG/DL
CO2: 25 MMOL/L (ref 21–32)
CREAT SERPL-MCNC: 0.6 MG/DL (ref 0.6–1.1)
DIFFERENTIAL TYPE: ABNORMAL
EOSINOPHILS ABSOLUTE: 0.2 K/CU MM
EOSINOPHILS RELATIVE PERCENT: 4.5 % (ref 0–3)
ESTIMATED AVERAGE GLUCOSE: 126 MG/DL
GFR SERPL CREATININE-BSD FRML MDRD: >60 ML/MIN/1.73M2
GLUCOSE SERPL-MCNC: 98 MG/DL (ref 70–99)
HBA1C MFR BLD: 6 % (ref 4.2–6.3)
HCT VFR BLD CALC: 38.8 % (ref 37–47)
HDLC SERPL-MCNC: 61 MG/DL
HEMOGLOBIN: 12.7 GM/DL (ref 12.5–16)
IMMATURE NEUTROPHIL %: 0.5 % (ref 0–0.43)
LDLC SERPL CALC-MCNC: 56 MG/DL
LYMPHOCYTES ABSOLUTE: 1.3 K/CU MM
LYMPHOCYTES RELATIVE PERCENT: 30.3 % (ref 24–44)
MCH RBC QN AUTO: 30.5 PG (ref 27–31)
MCHC RBC AUTO-ENTMCNC: 32.7 % (ref 32–36)
MCV RBC AUTO: 93 FL (ref 78–100)
MONOCYTES ABSOLUTE: 0.8 K/CU MM
MONOCYTES RELATIVE PERCENT: 18.3 % (ref 0–4)
NUCLEATED RBC %: 0 %
PDW BLD-RTO: 13.2 % (ref 11.7–14.9)
PLATELET # BLD: 265 K/CU MM (ref 140–440)
PMV BLD AUTO: 9.1 FL (ref 7.5–11.1)
POTASSIUM SERPL-SCNC: 3.9 MMOL/L (ref 3.5–5.1)
RBC # BLD: 4.17 M/CU MM (ref 4.2–5.4)
SEGMENTED NEUTROPHILS ABSOLUTE COUNT: 2 K/CU MM
SEGMENTED NEUTROPHILS RELATIVE PERCENT: 44.8 % (ref 36–66)
SODIUM BLD-SCNC: 137 MMOL/L (ref 135–145)
TOTAL IMMATURE NEUTOROPHIL: 0.02 K/CU MM
TOTAL NUCLEATED RBC: 0 K/CU MM
TOTAL PROTEIN: 6.8 GM/DL (ref 6.4–8.2)
TRIGL SERPL-MCNC: 154 MG/DL
TSH SERPL DL<=0.005 MIU/L-ACNC: 0.34 UIU/ML (ref 0.27–4.2)
WBC # BLD: 4.4 K/CU MM (ref 4–10.5)

## 2024-01-16 PROCEDURE — 80061 LIPID PANEL: CPT

## 2024-01-16 PROCEDURE — 36415 COLL VENOUS BLD VENIPUNCTURE: CPT

## 2024-01-16 PROCEDURE — 82306 VITAMIN D 25 HYDROXY: CPT

## 2024-01-16 PROCEDURE — 83036 HEMOGLOBIN GLYCOSYLATED A1C: CPT

## 2024-01-16 PROCEDURE — 80053 COMPREHEN METABOLIC PANEL: CPT

## 2024-01-16 PROCEDURE — 84443 ASSAY THYROID STIM HORMONE: CPT

## 2024-01-16 PROCEDURE — 85025 COMPLETE CBC W/AUTO DIFF WBC: CPT

## 2024-01-18 ENCOUNTER — HOSPITAL ENCOUNTER (OUTPATIENT)
Dept: INFUSION THERAPY | Age: 74
Setting detail: INFUSION SERIES
Discharge: HOME | End: 2024-01-18
Payer: MEDICARE

## 2024-01-18 VITALS
TEMPERATURE: 97.1 F | BODY MASS INDEX: 38.59 KG/M2 | DIASTOLIC BLOOD PRESSURE: 73 MMHG | OXYGEN SATURATION: 97 % | HEART RATE: 75 BPM | SYSTOLIC BLOOD PRESSURE: 144 MMHG | RESPIRATION RATE: 16 BRPM | WEIGHT: 211 LBS

## 2024-01-18 DIAGNOSIS — K51.911 ULCERATIVE COLITIS WITH RECTAL BLEEDING, UNSPECIFIED LOCATION (HCC): Primary | ICD-10-CM

## 2024-01-18 PROCEDURE — 96365 THER/PROPH/DIAG IV INF INIT: CPT

## 2024-01-18 PROCEDURE — 6360000002 HC RX W HCPCS: Performed by: INTERNAL MEDICINE

## 2024-01-18 PROCEDURE — 2580000003 HC RX 258: Performed by: INTERNAL MEDICINE

## 2024-01-18 RX ORDER — DIPHENHYDRAMINE HYDROCHLORIDE 50 MG/ML
50 INJECTION INTRAMUSCULAR; INTRAVENOUS
Status: CANCELLED | OUTPATIENT
Start: 2024-01-18

## 2024-01-18 RX ORDER — ONDANSETRON 2 MG/ML
8 INJECTION INTRAMUSCULAR; INTRAVENOUS
Status: CANCELLED | OUTPATIENT
Start: 2024-01-18

## 2024-01-18 RX ORDER — SODIUM CHLORIDE 0.9 % (FLUSH) 0.9 %
5-40 SYRINGE (ML) INJECTION PRN
Status: DISCONTINUED | OUTPATIENT
Start: 2024-01-18 | End: 2024-01-18

## 2024-01-18 RX ORDER — ACETAMINOPHEN 325 MG/1
650 TABLET ORAL
Status: CANCELLED | OUTPATIENT
Start: 2024-01-18

## 2024-01-18 RX ORDER — FAMOTIDINE 10 MG/ML
20 INJECTION, SOLUTION INTRAVENOUS
Status: CANCELLED | OUTPATIENT
Start: 2024-01-18

## 2024-01-18 RX ORDER — SODIUM CHLORIDE 0.9 % (FLUSH) 0.9 %
5-40 SYRINGE (ML) INJECTION PRN
Status: CANCELLED | OUTPATIENT
Start: 2024-01-18

## 2024-01-18 RX ORDER — ALBUTEROL SULFATE 90 UG/1
4 AEROSOL, METERED RESPIRATORY (INHALATION) PRN
Status: CANCELLED | OUTPATIENT
Start: 2024-01-18

## 2024-01-18 RX ORDER — EPINEPHRINE 1 MG/ML
0.3 INJECTION, SOLUTION, CONCENTRATE INTRAVENOUS PRN
Status: CANCELLED | OUTPATIENT
Start: 2024-01-18

## 2024-01-18 RX ADMIN — SODIUM CHLORIDE, PRESERVATIVE FREE 10 ML: 5 INJECTION INTRAVENOUS at 08:37

## 2024-01-18 RX ADMIN — SODIUM CHLORIDE, PRESERVATIVE FREE 10 ML: 5 INJECTION INTRAVENOUS at 10:50

## 2024-01-18 RX ADMIN — INFLIXIMAB 500 MG: 100 INJECTION, POWDER, LYOPHILIZED, FOR SOLUTION INTRAVENOUS at 09:48

## 2024-01-22 ENCOUNTER — OFFICE VISIT (OUTPATIENT)
Dept: INTERNAL MEDICINE CLINIC | Age: 74
End: 2024-01-22
Payer: MEDICARE

## 2024-01-22 VITALS
DIASTOLIC BLOOD PRESSURE: 68 MMHG | WEIGHT: 212 LBS | HEIGHT: 62 IN | HEART RATE: 75 BPM | SYSTOLIC BLOOD PRESSURE: 122 MMHG | BODY MASS INDEX: 39.01 KG/M2

## 2024-01-22 DIAGNOSIS — K51.30 ULCERATIVE RECTOSIGMOIDITIS WITHOUT COMPLICATION (HCC): ICD-10-CM

## 2024-01-22 DIAGNOSIS — E55.9 VITAMIN D DEFICIENCY: ICD-10-CM

## 2024-01-22 DIAGNOSIS — I10 PRIMARY HYPERTENSION: ICD-10-CM

## 2024-01-22 DIAGNOSIS — R73.03 PREDIABETES: ICD-10-CM

## 2024-01-22 DIAGNOSIS — E03.9 HYPOTHYROIDISM, UNSPECIFIED TYPE: Primary | ICD-10-CM

## 2024-01-22 DIAGNOSIS — E78.49 OTHER HYPERLIPIDEMIA: ICD-10-CM

## 2024-01-22 DIAGNOSIS — F33.42 MAJOR DEPRESSIVE DISORDER, RECURRENT, IN FULL REMISSION (HCC): ICD-10-CM

## 2024-01-22 DIAGNOSIS — R41.3 MEMORY CHANGES: ICD-10-CM

## 2024-01-22 PROBLEM — D84.9 IMMUNOCOMPROMISED (HCC): Status: RESOLVED | Noted: 2020-09-17 | Resolved: 2024-01-22

## 2024-01-22 PROCEDURE — 99214 OFFICE O/P EST MOD 30 MIN: CPT | Performed by: FAMILY MEDICINE

## 2024-01-22 PROCEDURE — 3017F COLORECTAL CA SCREEN DOC REV: CPT | Performed by: FAMILY MEDICINE

## 2024-01-22 PROCEDURE — 1123F ACP DISCUSS/DSCN MKR DOCD: CPT | Performed by: FAMILY MEDICINE

## 2024-01-22 PROCEDURE — G8484 FLU IMMUNIZE NO ADMIN: HCPCS | Performed by: FAMILY MEDICINE

## 2024-01-22 PROCEDURE — 3078F DIAST BP <80 MM HG: CPT | Performed by: FAMILY MEDICINE

## 2024-01-22 PROCEDURE — 3074F SYST BP LT 130 MM HG: CPT | Performed by: FAMILY MEDICINE

## 2024-01-22 PROCEDURE — G8417 CALC BMI ABV UP PARAM F/U: HCPCS | Performed by: FAMILY MEDICINE

## 2024-01-22 PROCEDURE — G8399 PT W/DXA RESULTS DOCUMENT: HCPCS | Performed by: FAMILY MEDICINE

## 2024-01-22 PROCEDURE — 1090F PRES/ABSN URINE INCON ASSESS: CPT | Performed by: FAMILY MEDICINE

## 2024-01-22 PROCEDURE — G8427 DOCREV CUR MEDS BY ELIG CLIN: HCPCS | Performed by: FAMILY MEDICINE

## 2024-01-22 PROCEDURE — 1036F TOBACCO NON-USER: CPT | Performed by: FAMILY MEDICINE

## 2024-01-22 ASSESSMENT — PATIENT HEALTH QUESTIONNAIRE - PHQ9
2. FEELING DOWN, DEPRESSED OR HOPELESS: 0
8. MOVING OR SPEAKING SO SLOWLY THAT OTHER PEOPLE COULD HAVE NOTICED. OR THE OPPOSITE, BEING SO FIGETY OR RESTLESS THAT YOU HAVE BEEN MOVING AROUND A LOT MORE THAN USUAL: 0
3. TROUBLE FALLING OR STAYING ASLEEP: 0
5. POOR APPETITE OR OVEREATING: 0
4. FEELING TIRED OR HAVING LITTLE ENERGY: 0
1. LITTLE INTEREST OR PLEASURE IN DOING THINGS: 0
9. THOUGHTS THAT YOU WOULD BE BETTER OFF DEAD, OR OF HURTING YOURSELF: 0
6. FEELING BAD ABOUT YOURSELF - OR THAT YOU ARE A FAILURE OR HAVE LET YOURSELF OR YOUR FAMILY DOWN: 0
SUM OF ALL RESPONSES TO PHQ QUESTIONS 1-9: 0
10. IF YOU CHECKED OFF ANY PROBLEMS, HOW DIFFICULT HAVE THESE PROBLEMS MADE IT FOR YOU TO DO YOUR WORK, TAKE CARE OF THINGS AT HOME, OR GET ALONG WITH OTHER PEOPLE: 0
SUM OF ALL RESPONSES TO PHQ QUESTIONS 1-9: 0
7. TROUBLE CONCENTRATING ON THINGS, SUCH AS READING THE NEWSPAPER OR WATCHING TELEVISION: 0
SUM OF ALL RESPONSES TO PHQ9 QUESTIONS 1 & 2: 0

## 2024-01-22 ASSESSMENT — ENCOUNTER SYMPTOMS
COLOR CHANGE: 0
SHORTNESS OF BREATH: 0
VOMITING: 0
SORE THROAT: 0
CHEST TIGHTNESS: 0
DIARRHEA: 0
CONSTIPATION: 0
ABDOMINAL PAIN: 0

## 2024-01-22 NOTE — PROGRESS NOTES
Subjective:      Chief Complaint   Patient presents with    6 Month Follow-Up       HPI:  Yovana Devine is a 73 y.o. female who presents today for follow up of chronic conditions as listed below.    Depression:  Symptoms still doing well with medication.  States she has noticed some difficulty with memory, but have been gradual/chronic changes.  Mostly forgetting things easily.  Had head CT completed in .       Hypothyroidism:  Denying any symptoms.     Prediabetes:  States she was able to lose 30 lbs with lifestyle modifications (over a few years) before COVID, but then gained it back during COVID.  Has joint gym with [employer] and is hoping to start exercising again regularly.      HLP:  Compliant with lipitor.    HTN:  BP's normal.    States lipoma that was removed on R knee has recurred.  Given complications with last excision, wants to wait but states she will have it done with a different surgeon if she does get it removed.       Otherwise feeling well today, no acute concerns.  Labs reviewed.        Past Medical History:   Diagnosis Date    Anxiety and depression     Asthma     Colitis, ulcerative ([facility])     Jordin Omer    Hyperlipidemia     Hypertension     Hypothyroidism     Mild aortic stenosis 2018, echo    Obesity, Class III, BMI 40-49.9 (morbid obesity) ([facility]) 2018    Osteopenia 2017    Prediabetes     Rib fracture 2014    8  right side from syncope    Ulcerative colitis with rectal bleeding ([facility]) 2021        Past Surgical History:   Procedure Laterality Date    BREAST BIOPSY Left     twice; [place]    CARPAL TUNNEL RELEASE Right      SECTION      one    HYSTERECTOMY (CERVIX STATUS UNKNOWN)      HYSTERECTOMY, TOTAL ABDOMINAL (CERVIX REMOVED)      MARY BETH AND BSO (CERVIX REMOVED)      TONSILLECTOMY AND ADENOIDECTOMY      age 4    WRIST GANGLION EXCISION Left        Social History     Tobacco Use    Smoking status: Never    Smokeless tobacco: Never   Substance Use

## 2024-01-24 RX ORDER — ALBUTEROL SULFATE 90 UG/1
4 AEROSOL, METERED RESPIRATORY (INHALATION) PRN
OUTPATIENT
Start: 2024-03-14

## 2024-01-24 RX ORDER — FAMOTIDINE 10 MG/ML
20 INJECTION, SOLUTION INTRAVENOUS
OUTPATIENT
Start: 2024-03-14

## 2024-01-24 RX ORDER — ACETAMINOPHEN 325 MG/1
650 TABLET ORAL
OUTPATIENT
Start: 2024-03-14

## 2024-01-24 RX ORDER — SODIUM CHLORIDE 0.9 % (FLUSH) 0.9 %
5-40 SYRINGE (ML) INJECTION PRN
OUTPATIENT
Start: 2024-03-14

## 2024-01-24 RX ORDER — EPINEPHRINE 1 MG/ML
0.3 INJECTION, SOLUTION, CONCENTRATE INTRAVENOUS PRN
OUTPATIENT
Start: 2024-03-14

## 2024-01-24 RX ORDER — SODIUM CHLORIDE 9 MG/ML
INJECTION, SOLUTION INTRAVENOUS CONTINUOUS
OUTPATIENT
Start: 2024-03-14

## 2024-01-24 RX ORDER — DIPHENHYDRAMINE HYDROCHLORIDE 50 MG/ML
50 INJECTION INTRAMUSCULAR; INTRAVENOUS
OUTPATIENT
Start: 2024-03-14

## 2024-01-24 RX ORDER — ONDANSETRON 2 MG/ML
8 INJECTION INTRAMUSCULAR; INTRAVENOUS
OUTPATIENT
Start: 2024-03-14

## 2024-02-05 RX ORDER — ATORVASTATIN CALCIUM 20 MG/1
20 TABLET, FILM COATED ORAL DAILY
Qty: 90 TABLET | Refills: 3 | Status: SHIPPED | OUTPATIENT
Start: 2024-02-05

## 2024-03-09 DIAGNOSIS — E03.9 HYPOTHYROIDISM, UNSPECIFIED TYPE: ICD-10-CM

## 2024-03-11 RX ORDER — LEVOTHYROXINE SODIUM 112 UG/1
TABLET ORAL
Qty: 90 TABLET | Refills: 1 | Status: SHIPPED | OUTPATIENT
Start: 2024-03-11

## 2024-03-14 ENCOUNTER — HOSPITAL ENCOUNTER (OUTPATIENT)
Dept: INFUSION THERAPY | Age: 74
Setting detail: INFUSION SERIES
Discharge: HOME | End: 2024-03-14
Payer: MEDICARE

## 2024-03-14 VITALS
HEART RATE: 79 BPM | SYSTOLIC BLOOD PRESSURE: 137 MMHG | TEMPERATURE: 98.6 F | RESPIRATION RATE: 16 BRPM | DIASTOLIC BLOOD PRESSURE: 61 MMHG | BODY MASS INDEX: 39.13 KG/M2 | OXYGEN SATURATION: 96 % | WEIGHT: 214 LBS

## 2024-03-14 DIAGNOSIS — K51.911 ULCERATIVE COLITIS WITH RECTAL BLEEDING, UNSPECIFIED LOCATION (HCC): Primary | ICD-10-CM

## 2024-03-14 PROCEDURE — 96365 THER/PROPH/DIAG IV INF INIT: CPT

## 2024-03-14 PROCEDURE — 6360000002 HC RX W HCPCS: Performed by: INTERNAL MEDICINE

## 2024-03-14 PROCEDURE — 2580000003 HC RX 258: Performed by: INTERNAL MEDICINE

## 2024-03-14 PROCEDURE — 96413 CHEMO IV INFUSION 1 HR: CPT

## 2024-03-14 RX ORDER — FAMOTIDINE 10 MG/ML
20 INJECTION, SOLUTION INTRAVENOUS
OUTPATIENT
Start: 2024-05-09

## 2024-03-14 RX ORDER — DIPHENHYDRAMINE HYDROCHLORIDE 50 MG/ML
50 INJECTION INTRAMUSCULAR; INTRAVENOUS
OUTPATIENT
Start: 2024-05-09

## 2024-03-14 RX ORDER — SODIUM CHLORIDE 9 MG/ML
INJECTION, SOLUTION INTRAVENOUS CONTINUOUS
OUTPATIENT
Start: 2024-05-09

## 2024-03-14 RX ORDER — SODIUM CHLORIDE 0.9 % (FLUSH) 0.9 %
5-40 SYRINGE (ML) INJECTION PRN
OUTPATIENT
Start: 2024-05-09

## 2024-03-14 RX ORDER — ALBUTEROL SULFATE 90 UG/1
4 AEROSOL, METERED RESPIRATORY (INHALATION) PRN
OUTPATIENT
Start: 2024-05-09

## 2024-03-14 RX ORDER — EPINEPHRINE 1 MG/ML
0.3 INJECTION, SOLUTION, CONCENTRATE INTRAVENOUS PRN
OUTPATIENT
Start: 2024-05-09

## 2024-03-14 RX ORDER — ONDANSETRON 2 MG/ML
8 INJECTION INTRAMUSCULAR; INTRAVENOUS
OUTPATIENT
Start: 2024-05-09

## 2024-03-14 RX ORDER — SODIUM CHLORIDE 0.9 % (FLUSH) 0.9 %
5-40 SYRINGE (ML) INJECTION PRN
Status: DISCONTINUED | OUTPATIENT
Start: 2024-03-14 | End: 2024-03-15 | Stop reason: HOSPADM

## 2024-03-14 RX ORDER — ACETAMINOPHEN 325 MG/1
650 TABLET ORAL
OUTPATIENT
Start: 2024-05-09

## 2024-03-14 RX ADMIN — SODIUM CHLORIDE, PRESERVATIVE FREE 10 ML: 5 INJECTION INTRAVENOUS at 08:40

## 2024-03-14 RX ADMIN — SODIUM CHLORIDE, PRESERVATIVE FREE 10 ML: 5 INJECTION INTRAVENOUS at 09:53

## 2024-03-14 RX ADMIN — INFLIXIMAB 500 MG: 100 INJECTION, POWDER, LYOPHILIZED, FOR SOLUTION INTRAVENOUS at 08:42

## 2024-03-14 NOTE — PROGRESS NOTES
Pt taken to room 02 for remicade infusion. Pt oriented to room, call light, bed/chair controls, TV, pt voiced understanding.  Plan of care explained to pt, pt voiced understanding.

## 2024-05-07 ENCOUNTER — HOSPITAL ENCOUNTER (OUTPATIENT)
Dept: INFUSION THERAPY | Age: 74
Setting detail: INFUSION SERIES
Discharge: HOME | End: 2024-05-07
Payer: MEDICARE

## 2024-05-07 VITALS
RESPIRATION RATE: 16 BRPM | SYSTOLIC BLOOD PRESSURE: 159 MMHG | OXYGEN SATURATION: 99 % | DIASTOLIC BLOOD PRESSURE: 66 MMHG | HEART RATE: 84 BPM | WEIGHT: 211 LBS | BODY MASS INDEX: 38.58 KG/M2 | TEMPERATURE: 97.1 F

## 2024-05-07 DIAGNOSIS — K51.911 ULCERATIVE COLITIS WITH RECTAL BLEEDING, UNSPECIFIED LOCATION (HCC): Primary | ICD-10-CM

## 2024-05-07 PROCEDURE — 96365 THER/PROPH/DIAG IV INF INIT: CPT

## 2024-05-07 PROCEDURE — 6360000002 HC RX W HCPCS: Performed by: INTERNAL MEDICINE

## 2024-05-07 PROCEDURE — 2580000003 HC RX 258: Performed by: INTERNAL MEDICINE

## 2024-05-07 RX ORDER — SODIUM CHLORIDE 0.9 % (FLUSH) 0.9 %
5-40 SYRINGE (ML) INJECTION PRN
Status: DISCONTINUED | OUTPATIENT
Start: 2024-05-07 | End: 2024-05-08 | Stop reason: HOSPADM

## 2024-05-07 RX ORDER — ACETAMINOPHEN 325 MG/1
650 TABLET ORAL
OUTPATIENT
Start: 2024-07-02

## 2024-05-07 RX ORDER — DIPHENHYDRAMINE HYDROCHLORIDE 50 MG/ML
50 INJECTION INTRAMUSCULAR; INTRAVENOUS
OUTPATIENT
Start: 2024-07-02

## 2024-05-07 RX ORDER — ONDANSETRON 2 MG/ML
8 INJECTION INTRAMUSCULAR; INTRAVENOUS
OUTPATIENT
Start: 2024-07-02

## 2024-05-07 RX ORDER — FAMOTIDINE 10 MG/ML
20 INJECTION, SOLUTION INTRAVENOUS
OUTPATIENT
Start: 2024-07-02

## 2024-05-07 RX ORDER — ALBUTEROL SULFATE 90 UG/1
4 AEROSOL, METERED RESPIRATORY (INHALATION) PRN
OUTPATIENT
Start: 2024-07-02

## 2024-05-07 RX ORDER — EPINEPHRINE 1 MG/ML
0.3 INJECTION, SOLUTION INTRAMUSCULAR; SUBCUTANEOUS PRN
OUTPATIENT
Start: 2024-07-02

## 2024-05-07 RX ORDER — SODIUM CHLORIDE 0.9 % (FLUSH) 0.9 %
5-40 SYRINGE (ML) INJECTION PRN
OUTPATIENT
Start: 2024-07-02

## 2024-05-07 RX ORDER — SODIUM CHLORIDE 9 MG/ML
INJECTION, SOLUTION INTRAVENOUS CONTINUOUS
OUTPATIENT
Start: 2024-07-02

## 2024-05-07 RX ADMIN — SODIUM CHLORIDE, PRESERVATIVE FREE 10 ML: 5 INJECTION INTRAVENOUS at 09:56

## 2024-05-07 RX ADMIN — INFLIXIMAB 500 MG: 100 INJECTION, POWDER, LYOPHILIZED, FOR SOLUTION INTRAVENOUS at 08:49

## 2024-05-07 RX ADMIN — SODIUM CHLORIDE, PRESERVATIVE FREE 10 ML: 5 INJECTION INTRAVENOUS at 08:37

## 2024-05-15 DIAGNOSIS — F32.A ANXIETY AND DEPRESSION: ICD-10-CM

## 2024-05-15 DIAGNOSIS — F41.9 ANXIETY AND DEPRESSION: ICD-10-CM

## 2024-05-15 RX ORDER — FLUOXETINE HYDROCHLORIDE 20 MG/1
CAPSULE ORAL
Qty: 90 CAPSULE | Refills: 0 | Status: SHIPPED | OUTPATIENT
Start: 2024-05-15

## 2024-07-02 ENCOUNTER — HOSPITAL ENCOUNTER (OUTPATIENT)
Dept: INFUSION THERAPY | Age: 74
Setting detail: INFUSION SERIES
Discharge: HOME | End: 2024-07-02
Payer: MEDICARE

## 2024-07-02 VITALS
BODY MASS INDEX: 37.67 KG/M2 | HEART RATE: 66 BPM | SYSTOLIC BLOOD PRESSURE: 112 MMHG | DIASTOLIC BLOOD PRESSURE: 63 MMHG | RESPIRATION RATE: 16 BRPM | WEIGHT: 206 LBS | OXYGEN SATURATION: 98 % | TEMPERATURE: 97.7 F

## 2024-07-02 DIAGNOSIS — K51.911 ULCERATIVE COLITIS WITH RECTAL BLEEDING, UNSPECIFIED LOCATION (HCC): Primary | ICD-10-CM

## 2024-07-02 PROCEDURE — 96413 CHEMO IV INFUSION 1 HR: CPT

## 2024-07-02 PROCEDURE — 96365 THER/PROPH/DIAG IV INF INIT: CPT

## 2024-07-02 PROCEDURE — 2580000003 HC RX 258: Performed by: INTERNAL MEDICINE

## 2024-07-02 PROCEDURE — 6360000002 HC RX W HCPCS: Performed by: INTERNAL MEDICINE

## 2024-07-02 RX ORDER — DIPHENHYDRAMINE HYDROCHLORIDE 50 MG/ML
50 INJECTION INTRAMUSCULAR; INTRAVENOUS
OUTPATIENT
Start: 2024-08-27

## 2024-07-02 RX ORDER — SODIUM CHLORIDE 9 MG/ML
INJECTION, SOLUTION INTRAVENOUS CONTINUOUS
OUTPATIENT
Start: 2024-08-27

## 2024-07-02 RX ORDER — FAMOTIDINE 10 MG/ML
20 INJECTION, SOLUTION INTRAVENOUS
OUTPATIENT
Start: 2024-08-27

## 2024-07-02 RX ORDER — ONDANSETRON 2 MG/ML
8 INJECTION INTRAMUSCULAR; INTRAVENOUS
OUTPATIENT
Start: 2024-08-27

## 2024-07-02 RX ORDER — ALBUTEROL SULFATE 90 UG/1
4 AEROSOL, METERED RESPIRATORY (INHALATION) PRN
OUTPATIENT
Start: 2024-08-27

## 2024-07-02 RX ORDER — ACETAMINOPHEN 325 MG/1
650 TABLET ORAL
OUTPATIENT
Start: 2024-08-27

## 2024-07-02 RX ORDER — SODIUM CHLORIDE 0.9 % (FLUSH) 0.9 %
5-40 SYRINGE (ML) INJECTION PRN
OUTPATIENT
Start: 2024-08-27

## 2024-07-02 RX ORDER — SODIUM CHLORIDE 0.9 % (FLUSH) 0.9 %
5-40 SYRINGE (ML) INJECTION PRN
Status: DISCONTINUED | OUTPATIENT
Start: 2024-07-02 | End: 2024-07-03 | Stop reason: HOSPADM

## 2024-07-02 RX ORDER — EPINEPHRINE 1 MG/ML
0.3 INJECTION, SOLUTION INTRAMUSCULAR; SUBCUTANEOUS PRN
OUTPATIENT
Start: 2024-08-27

## 2024-07-02 RX ADMIN — SODIUM CHLORIDE, PRESERVATIVE FREE 10 ML: 5 INJECTION INTRAVENOUS at 10:10

## 2024-07-02 RX ADMIN — SODIUM CHLORIDE, PRESERVATIVE FREE 10 ML: 5 INJECTION INTRAVENOUS at 08:45

## 2024-07-02 RX ADMIN — INFLIXIMAB 500 MG: 100 INJECTION, POWDER, LYOPHILIZED, FOR SOLUTION INTRAVENOUS at 09:04

## 2024-07-02 NOTE — DISCHARGE INSTRUCTIONS
Continue same medications, diet, and activity.        Seek medical help for signs and symptoms of an allergic reaction; hives, chills, rash,chest pain, difficulty breathing, or swelling of face and/or throat.      Talk to your Doctor before receiving any vaccinations or starting any new medicines.        Thank you for choosing [facility]. It is our pleasure to serve you.        [facility]   [phone]  8AM-5PM

## 2024-07-02 NOTE — PROGRESS NOTES
Ambulatory to unit room 2 for Renflexis.Orientated to unit.Procedure and plan of care explained.Questions answered.Understanding verbalized.Tolerated  well.Reviewed discharge instructions, understanding verbalized.Copies of AVS given to take home. Patient discharged home.Down to exit per self.    Orders Placed This Encounter   Medications    inFLIXimab-abda (RENFLEXIS) 500 mg in sodium chloride 0.9 % 250 mL IVPB    sodium chloride flush 0.9 % injection 5-40 mL

## 2024-07-15 ENCOUNTER — HOSPITAL ENCOUNTER (OUTPATIENT)
Age: 74
Discharge: HOME | End: 2024-07-15
Payer: MEDICARE

## 2024-07-15 DIAGNOSIS — E55.9 VITAMIN D DEFICIENCY: ICD-10-CM

## 2024-07-15 DIAGNOSIS — I10 PRIMARY HYPERTENSION: ICD-10-CM

## 2024-07-15 DIAGNOSIS — E78.49 OTHER HYPERLIPIDEMIA: ICD-10-CM

## 2024-07-15 DIAGNOSIS — R73.03 PREDIABETES: ICD-10-CM

## 2024-07-15 DIAGNOSIS — E03.9 HYPOTHYROIDISM, UNSPECIFIED TYPE: ICD-10-CM

## 2024-07-15 LAB
25(OH)D3 SERPL-MCNC: 23.76 NG/ML
ALBUMIN SERPL-MCNC: 4.6 GM/DL (ref 3.4–5)
ALP BLD-CCNC: 60 IU/L (ref 40–128)
ALT SERPL-CCNC: 23 U/L (ref 10–40)
ANION GAP SERPL CALCULATED.3IONS-SCNC: 12 MMOL/L (ref 7–16)
AST SERPL-CCNC: 22 IU/L (ref 15–37)
BASOPHILS ABSOLUTE: 0.1 K/CU MM
BASOPHILS RELATIVE PERCENT: 1.6 % (ref 0–1)
BILIRUB SERPL-MCNC: 0.5 MG/DL (ref 0–1)
BUN SERPL-MCNC: 13 MG/DL (ref 6–23)
CALCIUM SERPL-MCNC: 9.6 MG/DL (ref 8.3–10.6)
CHLORIDE BLD-SCNC: 101 MMOL/L (ref 99–110)
CHOLEST SERPL-MCNC: 143 MG/DL
CO2: 28 MMOL/L (ref 21–32)
CREAT SERPL-MCNC: 0.7 MG/DL (ref 0.6–1.1)
DIFFERENTIAL TYPE: ABNORMAL
EOSINOPHILS ABSOLUTE: 0.1 K/CU MM
EOSINOPHILS RELATIVE PERCENT: 3 % (ref 0–3)
ESTIMATED AVERAGE GLUCOSE: 128 MG/DL
GFR, ESTIMATED: >90 ML/MIN/1.73M2
GLUCOSE SERPL-MCNC: 103 MG/DL (ref 70–99)
HBA1C MFR BLD: 6.1 % (ref 4.2–6.3)
HCT VFR BLD CALC: 41.1 % (ref 37–47)
HDLC SERPL-MCNC: 56 MG/DL
HEMOGLOBIN: 13.3 GM/DL (ref 12.5–16)
IMMATURE NEUTROPHIL %: 0.2 % (ref 0–0.43)
LDLC SERPL CALC-MCNC: 55 MG/DL
LYMPHOCYTES ABSOLUTE: 1.4 K/CU MM
LYMPHOCYTES RELATIVE PERCENT: 33.1 % (ref 24–44)
MCH RBC QN AUTO: 30.5 PG (ref 27–31)
MCHC RBC AUTO-ENTMCNC: 32.4 % (ref 32–36)
MCV RBC AUTO: 94.3 FL (ref 78–100)
MONOCYTES ABSOLUTE: 0.7 K/CU MM
MONOCYTES RELATIVE PERCENT: 15.3 % (ref 0–4)
NEUTROPHILS ABSOLUTE: 2 K/CU MM
NEUTROPHILS RELATIVE PERCENT: 46.8 % (ref 36–66)
NUCLEATED RBC %: 0 %
PDW BLD-RTO: 12.9 % (ref 11.7–14.9)
PLATELET # BLD: 261 K/CU MM (ref 140–440)
PMV BLD AUTO: 9.7 FL (ref 7.5–11.1)
POTASSIUM SERPL-SCNC: 4.6 MMOL/L (ref 3.5–5.1)
RBC # BLD: 4.36 M/CU MM (ref 4.2–5.4)
SODIUM BLD-SCNC: 141 MMOL/L (ref 135–145)
TOTAL IMMATURE NEUTOROPHIL: 0.01 K/CU MM
TOTAL NUCLEATED RBC: 0 K/CU MM
TOTAL PROTEIN: 7.6 GM/DL (ref 6.4–8.2)
TRIGL SERPL-MCNC: 159 MG/DL
TSH SERPL DL<=0.005 MIU/L-ACNC: 0.1 UIU/ML (ref 0.27–4.2)
WBC # BLD: 4.3 K/CU MM (ref 4–10.5)

## 2024-07-15 PROCEDURE — 82306 VITAMIN D 25 HYDROXY: CPT

## 2024-07-15 PROCEDURE — 83036 HEMOGLOBIN GLYCOSYLATED A1C: CPT

## 2024-07-15 PROCEDURE — 85025 COMPLETE CBC W/AUTO DIFF WBC: CPT

## 2024-07-15 PROCEDURE — 80053 COMPREHEN METABOLIC PANEL: CPT

## 2024-07-15 PROCEDURE — 80061 LIPID PANEL: CPT

## 2024-07-15 PROCEDURE — 36415 COLL VENOUS BLD VENIPUNCTURE: CPT

## 2024-07-15 PROCEDURE — 84443 ASSAY THYROID STIM HORMONE: CPT

## 2024-07-22 ENCOUNTER — OFFICE VISIT (OUTPATIENT)
Dept: INTERNAL MEDICINE CLINIC | Age: 74
End: 2024-07-22

## 2024-07-22 VITALS
DIASTOLIC BLOOD PRESSURE: 60 MMHG | SYSTOLIC BLOOD PRESSURE: 130 MMHG | OXYGEN SATURATION: 98 % | WEIGHT: 211 LBS | HEART RATE: 71 BPM | BODY MASS INDEX: 38.58 KG/M2

## 2024-07-22 DIAGNOSIS — E78.49 OTHER HYPERLIPIDEMIA: ICD-10-CM

## 2024-07-22 DIAGNOSIS — I10 PRIMARY HYPERTENSION: ICD-10-CM

## 2024-07-22 DIAGNOSIS — J30.9 ALLERGIC RHINITIS, UNSPECIFIED SEASONALITY, UNSPECIFIED TRIGGER: ICD-10-CM

## 2024-07-22 DIAGNOSIS — E03.9 HYPOTHYROIDISM, UNSPECIFIED TYPE: Primary | ICD-10-CM

## 2024-07-22 DIAGNOSIS — E55.9 VITAMIN D DEFICIENCY: ICD-10-CM

## 2024-07-22 DIAGNOSIS — R73.03 PREDIABETES: ICD-10-CM

## 2024-07-22 RX ORDER — LEVOTHYROXINE SODIUM 0.1 MG/1
100 TABLET ORAL DAILY
Qty: 90 TABLET | Refills: 1 | Status: SHIPPED | OUTPATIENT
Start: 2024-07-22

## 2024-07-22 ASSESSMENT — ENCOUNTER SYMPTOMS
RHINORRHEA: 1
SHORTNESS OF BREATH: 0
VOMITING: 0
DIARRHEA: 0
ABDOMINAL PAIN: 0
CHEST TIGHTNESS: 0
CONSTIPATION: 0
SORE THROAT: 0
COLOR CHANGE: 0

## 2024-07-22 NOTE — PROGRESS NOTES
once a month        Review of Systems   Constitutional:  Negative for activity change, appetite change, chills, fever and unexpected weight change.   HENT:  Positive for postnasal drip and rhinorrhea. Negative for sore throat.    Respiratory:  Negative for chest tightness and shortness of breath.    Cardiovascular:  Negative for chest pain and palpitations.   Gastrointestinal:  Negative for abdominal pain, constipation, diarrhea and vomiting.   Genitourinary:  Negative for dysuria.   Skin:  Negative for color change.   Neurological:  Negative for dizziness and light-headedness.   Psychiatric/Behavioral:  Negative for dysphoric mood. The patient is not nervous/anxious.         Prior to Visit Medications    Medication Sig Taking? Authorizing Provider   UNABLE TO FIND CPAP MACHINE AND SUPPLIES Yes Guera Palafox MD   FLUoxetine (PROZAC) 20 MG capsule TAKE ONE (1) CAPSULE BY MOUTH ONCE DAILY Yes Guera Palafox MD   levothyroxine (SYNTHROID) 112 MCG tablet TAKE 1 TABLET BY MOUTH ONCE DAILY Yes Guera Palafox MD   atorvastatin (LIPITOR) 20 MG tablet Take 1 tablet by mouth daily Yes Guera Palafox MD   hydroCHLOROthiazide (MICROZIDE) 12.5 MG capsule Take 1 capsule by mouth every morning Yes Guera Palafox MD   losartan (COZAAR) 25 MG tablet Take 1 tablet by mouth nightly Yes Guera Palafox MD   loratadine (CLARITIN) 10 MG tablet Take 1 tablet by mouth daily Yes Wu Patterson MD   fluocinonide (LIDEX) 0.05 % cream Apply topically 2 times daily. Yes Daniel Flores MD   Mesalamine (LIALDA PO) Take 800 mg by mouth Taking 3 tablets daily Yes Wu Patterson MD   calcium-vitamin D (OSCAL-500) 500-200 MG-UNIT per tablet Take 1 tablet by mouth daily Yes Wu Patterson MD   fluticasone (FLONASE) 50 MCG/ACT nasal spray 2 sprays by Nasal route daily  Guera Palafox MD          Objective:      /60 (Site: Right Upper Arm, Position: Sitting, Cuff Size: Large

## 2024-08-15 DIAGNOSIS — F41.9 ANXIETY AND DEPRESSION: ICD-10-CM

## 2024-08-15 DIAGNOSIS — F32.A ANXIETY AND DEPRESSION: ICD-10-CM

## 2024-08-15 RX ORDER — FLUOXETINE HYDROCHLORIDE 20 MG/1
CAPSULE ORAL
Qty: 90 CAPSULE | Refills: 0 | Status: SHIPPED | OUTPATIENT
Start: 2024-08-15

## 2024-08-22 ENCOUNTER — HOSPITAL ENCOUNTER (OUTPATIENT)
Dept: INFUSION THERAPY | Age: 74
Setting detail: INFUSION SERIES
Discharge: HOME | End: 2024-08-22
Payer: MEDICARE

## 2024-08-22 VITALS
HEART RATE: 72 BPM | TEMPERATURE: 98 F | BODY MASS INDEX: 38.03 KG/M2 | SYSTOLIC BLOOD PRESSURE: 140 MMHG | DIASTOLIC BLOOD PRESSURE: 59 MMHG | WEIGHT: 208 LBS | OXYGEN SATURATION: 96 % | RESPIRATION RATE: 16 BRPM

## 2024-08-22 DIAGNOSIS — K51.911 ULCERATIVE COLITIS WITH RECTAL BLEEDING, UNSPECIFIED LOCATION (HCC): Primary | ICD-10-CM

## 2024-08-22 PROCEDURE — 96365 THER/PROPH/DIAG IV INF INIT: CPT

## 2024-08-22 PROCEDURE — 6360000002 HC RX W HCPCS: Performed by: INTERNAL MEDICINE

## 2024-08-22 PROCEDURE — 2580000003 HC RX 258: Performed by: INTERNAL MEDICINE

## 2024-08-22 RX ORDER — ACETAMINOPHEN 325 MG/1
650 TABLET ORAL
OUTPATIENT
Start: 2024-08-29

## 2024-08-22 RX ORDER — EPINEPHRINE 1 MG/ML
0.3 INJECTION, SOLUTION INTRAMUSCULAR; SUBCUTANEOUS PRN
OUTPATIENT
Start: 2024-08-29

## 2024-08-22 RX ORDER — ALBUTEROL SULFATE 90 UG/1
4 AEROSOL, METERED RESPIRATORY (INHALATION) PRN
OUTPATIENT
Start: 2024-08-29

## 2024-08-22 RX ORDER — FAMOTIDINE 10 MG/ML
20 INJECTION, SOLUTION INTRAVENOUS
OUTPATIENT
Start: 2024-08-29

## 2024-08-22 RX ORDER — SODIUM CHLORIDE 0.9 % (FLUSH) 0.9 %
5-40 SYRINGE (ML) INJECTION PRN
OUTPATIENT
Start: 2024-08-29

## 2024-08-22 RX ORDER — DIPHENHYDRAMINE HYDROCHLORIDE 50 MG/ML
50 INJECTION INTRAMUSCULAR; INTRAVENOUS
OUTPATIENT
Start: 2024-08-29

## 2024-08-22 RX ORDER — SODIUM CHLORIDE 9 MG/ML
INJECTION, SOLUTION INTRAVENOUS CONTINUOUS
OUTPATIENT
Start: 2024-08-29

## 2024-08-22 RX ORDER — SODIUM CHLORIDE 0.9 % (FLUSH) 0.9 %
5-40 SYRINGE (ML) INJECTION PRN
Status: DISCONTINUED | OUTPATIENT
Start: 2024-08-22 | End: 2024-08-23 | Stop reason: HOSPADM

## 2024-08-22 RX ORDER — ONDANSETRON 2 MG/ML
8 INJECTION INTRAMUSCULAR; INTRAVENOUS
OUTPATIENT
Start: 2024-08-29

## 2024-08-22 RX ADMIN — SODIUM CHLORIDE, PRESERVATIVE FREE 10 ML: 5 INJECTION INTRAVENOUS at 10:25

## 2024-08-22 RX ADMIN — INFLIXIMAB 500 MG: 100 INJECTION, POWDER, LYOPHILIZED, FOR SOLUTION INTRAVENOUS at 09:21

## 2024-08-22 NOTE — DISCHARGE INSTRUCTIONS
Continue same medications, diet, and activity.        Seek medical help for signs and symptoms of an allergic reaction; hives, chills, rash,chest pain, difficulty breathing, or swelling of face and/or throat.      Talk to your Doctor before receiving any vaccinations or starting any new medicines.         Thank you for choosing [facility]. It is our pleasure to serve you.        [facility]   [phone]  8AM-5PM

## 2024-08-22 NOTE — PROGRESS NOTES
Ambulatory to unit room 3 for Renflexis.Orientated to unit.Procedure and plan of care explained.Questions answered.Understanding verbalized.Tolerated  well.Reviewed discharge instructions, understanding verbalized.Copies of AVS given to take home. Patient discharged home.Down to exit per self.    Orders Placed This Encounter   Medications    inFLIXimab-abda (RENFLEXIS) 500 mg in sodium chloride 0.9 % 250 mL IVPB    sodium chloride flush 0.9 % injection 5-40 mL

## 2024-10-10 ENCOUNTER — HOSPITAL ENCOUNTER (OUTPATIENT)
Dept: INFUSION THERAPY | Age: 74
Setting detail: INFUSION SERIES
Discharge: HOME | End: 2024-10-10
Payer: MEDICARE

## 2024-10-10 VITALS
RESPIRATION RATE: 16 BRPM | DIASTOLIC BLOOD PRESSURE: 58 MMHG | HEART RATE: 72 BPM | SYSTOLIC BLOOD PRESSURE: 144 MMHG | WEIGHT: 210 LBS | TEMPERATURE: 98 F | BODY MASS INDEX: 38.4 KG/M2 | OXYGEN SATURATION: 95 %

## 2024-10-10 DIAGNOSIS — K51.911 ULCERATIVE COLITIS WITH RECTAL BLEEDING, UNSPECIFIED LOCATION (HCC): Primary | ICD-10-CM

## 2024-10-10 PROCEDURE — 96365 THER/PROPH/DIAG IV INF INIT: CPT

## 2024-10-10 PROCEDURE — 2580000003 HC RX 258: Performed by: INTERNAL MEDICINE

## 2024-10-10 PROCEDURE — 6360000002 HC RX W HCPCS: Performed by: INTERNAL MEDICINE

## 2024-10-10 RX ORDER — ACETAMINOPHEN 325 MG/1
650 TABLET ORAL
OUTPATIENT
Start: 2024-12-05

## 2024-10-10 RX ORDER — SODIUM CHLORIDE 0.9 % (FLUSH) 0.9 %
5-40 SYRINGE (ML) INJECTION PRN
Status: DISCONTINUED | OUTPATIENT
Start: 2024-10-10 | End: 2024-10-11 | Stop reason: HOSPADM

## 2024-10-10 RX ORDER — DIPHENHYDRAMINE HYDROCHLORIDE 50 MG/ML
50 INJECTION INTRAMUSCULAR; INTRAVENOUS
OUTPATIENT
Start: 2024-12-05

## 2024-10-10 RX ORDER — SODIUM CHLORIDE 0.9 % (FLUSH) 0.9 %
5-40 SYRINGE (ML) INJECTION PRN
OUTPATIENT
Start: 2024-12-05

## 2024-10-10 RX ORDER — EPINEPHRINE 1 MG/ML
0.3 INJECTION, SOLUTION INTRAMUSCULAR; SUBCUTANEOUS PRN
OUTPATIENT
Start: 2024-12-05

## 2024-10-10 RX ORDER — ONDANSETRON 2 MG/ML
8 INJECTION INTRAMUSCULAR; INTRAVENOUS
OUTPATIENT
Start: 2024-12-05

## 2024-10-10 RX ORDER — FAMOTIDINE 10 MG/ML
20 INJECTION, SOLUTION INTRAVENOUS
OUTPATIENT
Start: 2024-12-05

## 2024-10-10 RX ORDER — ALBUTEROL SULFATE 90 UG/1
4 INHALANT RESPIRATORY (INHALATION) PRN
OUTPATIENT
Start: 2024-12-05

## 2024-10-10 RX ORDER — SODIUM CHLORIDE 9 MG/ML
INJECTION, SOLUTION INTRAVENOUS CONTINUOUS
OUTPATIENT
Start: 2024-12-05

## 2024-10-10 RX ADMIN — SODIUM CHLORIDE, PRESERVATIVE FREE 10 ML: 5 INJECTION INTRAVENOUS at 10:50

## 2024-10-10 RX ADMIN — SODIUM CHLORIDE, PRESERVATIVE FREE 10 ML: 5 INJECTION INTRAVENOUS at 09:20

## 2024-10-10 RX ADMIN — INFLIXIMAB 500 MG: 100 INJECTION, POWDER, LYOPHILIZED, FOR SOLUTION INTRAVENOUS at 09:48

## 2024-10-10 NOTE — PROGRESS NOTES
Ambulatory to unit room 3 for Renflexis .Reorientated to unit.Procedure and plan of care explained.Questions answered.Understanding verbalized.Tolerated well.Reviewed discharge instructions, understanding verbalized.Copies of AVS declined to take home. Patient discharged home.Down to exit per self.    Orders Placed This Encounter   Medications    inFLIXimab-abda (RENFLEXIS) 500 mg in sodium chloride 0.9 % 250 mL IVPB    sodium chloride flush 0.9 % injection 5-40 mL

## 2024-10-10 NOTE — DISCHARGE INSTRUCTIONS
Continue same medications, diet, and activity.        Seek medical help for signs and symptoms of an allergic reaction; hives, chills, rash,chest pain, difficulty breathing, or swelling of face and/or throat.      Talk to your Doctor before receiving any vaccinations or starting any new medicines.          Thank you for choosing [facility]. It is our pleasure to serve you.        Outpatient [facility]fusion Center   [phone]  8AM-5PM

## 2024-11-13 DIAGNOSIS — F32.A ANXIETY AND DEPRESSION: ICD-10-CM

## 2024-11-13 DIAGNOSIS — F41.9 ANXIETY AND DEPRESSION: ICD-10-CM

## 2024-11-14 RX ORDER — ATORVASTATIN CALCIUM 20 MG/1
20 TABLET, FILM COATED ORAL DAILY
Qty: 90 TABLET | Refills: 1 | Status: SHIPPED | OUTPATIENT
Start: 2024-11-14

## 2024-11-18 ENCOUNTER — HOSPITAL ENCOUNTER (OUTPATIENT)
Age: 74
Discharge: HOME | End: 2024-11-18
Payer: MEDICARE

## 2024-11-18 DIAGNOSIS — E03.9 HYPOTHYROIDISM, UNSPECIFIED TYPE: ICD-10-CM

## 2024-11-18 DIAGNOSIS — R73.03 PREDIABETES: ICD-10-CM

## 2024-11-18 DIAGNOSIS — I10 PRIMARY HYPERTENSION: ICD-10-CM

## 2024-11-18 DIAGNOSIS — E55.9 VITAMIN D DEFICIENCY: ICD-10-CM

## 2024-11-18 LAB
25(OH)D3 SERPL-MCNC: 21.6 NG/ML (ref 30–150)
ALBUMIN SERPL-MCNC: 4.4 G/DL (ref 3.4–5)
ALBUMIN/GLOB SERPL: 1.7 {RATIO} (ref 1.1–2.2)
ALP SERPL-CCNC: 60 U/L (ref 40–129)
ALT SERPL-CCNC: 33 U/L (ref 10–40)
ANION GAP SERPL CALCULATED.3IONS-SCNC: 15 MMOL/L (ref 9–17)
AST SERPL-CCNC: 31 U/L (ref 15–37)
BASOPHILS # BLD: 0.07 K/UL
BASOPHILS NFR BLD: 1 % (ref 0–1)
BILIRUB SERPL-MCNC: 0.5 MG/DL (ref 0–1)
BUN SERPL-MCNC: 13 MG/DL (ref 7–20)
CALCIUM SERPL-MCNC: 9.8 MG/DL (ref 8.3–10.6)
CHLORIDE SERPL-SCNC: 96 MMOL/L (ref 99–110)
CO2 SERPL-SCNC: 26 MMOL/L (ref 21–32)
CREAT SERPL-MCNC: 0.7 MG/DL (ref 0.6–1.2)
EOSINOPHIL # BLD: 0.15 K/UL
EOSINOPHILS RELATIVE PERCENT: 3 % (ref 0–3)
ERYTHROCYTE [DISTWIDTH] IN BLOOD BY AUTOMATED COUNT: 13 % (ref 11.7–14.9)
EST. AVERAGE GLUCOSE BLD GHB EST-MCNC: 130 MG/DL
GFR, ESTIMATED: 79 ML/MIN/1.73M2
GLUCOSE SERPL-MCNC: 83 MG/DL (ref 74–99)
HBA1C MFR BLD: 6.2 % (ref 4.2–6.3)
HCT VFR BLD AUTO: 40.7 % (ref 37–47)
HGB BLD-MCNC: 13.5 G/DL (ref 12.5–16)
IMM GRANULOCYTES # BLD AUTO: 0.02 K/UL
IMM GRANULOCYTES NFR BLD: 0 %
LYMPHOCYTES NFR BLD: 1.68 K/UL
LYMPHOCYTES RELATIVE PERCENT: 33 % (ref 24–44)
MCH RBC QN AUTO: 31.3 PG (ref 27–31)
MCHC RBC AUTO-ENTMCNC: 33.2 G/DL (ref 32–36)
MCV RBC AUTO: 94.4 FL (ref 78–100)
MONOCYTES NFR BLD: 0.91 K/UL
MONOCYTES NFR BLD: 18 % (ref 0–4)
NEUTROPHILS NFR BLD: 45 % (ref 36–66)
NEUTS SEG NFR BLD: 2.27 K/UL
PLATELET # BLD AUTO: 268 K/UL (ref 140–440)
PMV BLD AUTO: 9.3 FL (ref 7.5–11.1)
POTASSIUM SERPL-SCNC: 4.4 MMOL/L (ref 3.5–5.1)
PROT SERPL-MCNC: 7 G/DL (ref 6.4–8.2)
RBC # BLD AUTO: 4.31 M/UL (ref 4.2–5.4)
SODIUM SERPL-SCNC: 137 MMOL/L (ref 136–145)
TSH SERPL DL<=0.05 MIU/L-ACNC: 3.02 UIU/ML (ref 0.27–4.2)
WBC OTHER # BLD: 5.1 K/UL (ref 4–10.5)

## 2024-11-18 PROCEDURE — 36415 COLL VENOUS BLD VENIPUNCTURE: CPT

## 2024-11-18 PROCEDURE — 84443 ASSAY THYROID STIM HORMONE: CPT

## 2024-11-18 PROCEDURE — 83036 HEMOGLOBIN GLYCOSYLATED A1C: CPT

## 2024-11-18 PROCEDURE — 85025 COMPLETE CBC W/AUTO DIFF WBC: CPT

## 2024-11-18 PROCEDURE — 82306 VITAMIN D 25 HYDROXY: CPT

## 2024-11-18 PROCEDURE — 80053 COMPREHEN METABOLIC PANEL: CPT

## 2024-11-19 RX ORDER — FLUTICASONE PROPIONATE 50 MCG
2 SPRAY, SUSPENSION (ML) NASAL DAILY
Qty: 1 EACH | Refills: 3 | Status: SHIPPED | OUTPATIENT
Start: 2024-11-19

## 2024-11-23 SDOH — HEALTH STABILITY: PHYSICAL HEALTH: ON AVERAGE, HOW MANY MINUTES DO YOU ENGAGE IN EXERCISE AT THIS LEVEL?: 30 MIN

## 2024-11-23 SDOH — HEALTH STABILITY: PHYSICAL HEALTH: ON AVERAGE, HOW MANY DAYS PER WEEK DO YOU ENGAGE IN MODERATE TO STRENUOUS EXERCISE (LIKE A BRISK WALK)?: 2 DAYS

## 2024-11-23 ASSESSMENT — PATIENT HEALTH QUESTIONNAIRE - PHQ9
1. LITTLE INTEREST OR PLEASURE IN DOING THINGS: NOT AT ALL
4. FEELING TIRED OR HAVING LITTLE ENERGY: NOT AT ALL
9. THOUGHTS THAT YOU WOULD BE BETTER OFF DEAD, OR OF HURTING YOURSELF: NOT AT ALL
SUM OF ALL RESPONSES TO PHQ QUESTIONS 1-9: 0
5. POOR APPETITE OR OVEREATING: NOT AT ALL
10. IF YOU CHECKED OFF ANY PROBLEMS, HOW DIFFICULT HAVE THESE PROBLEMS MADE IT FOR YOU TO DO YOUR WORK, TAKE CARE OF THINGS AT HOME, OR GET ALONG WITH OTHER PEOPLE: NOT DIFFICULT AT ALL
SUM OF ALL RESPONSES TO PHQ QUESTIONS 1-9: 0
7. TROUBLE CONCENTRATING ON THINGS, SUCH AS READING THE NEWSPAPER OR WATCHING TELEVISION: NOT AT ALL
SUM OF ALL RESPONSES TO PHQ QUESTIONS 1-9: 0
SUM OF ALL RESPONSES TO PHQ9 QUESTIONS 1 & 2: 0
3. TROUBLE FALLING OR STAYING ASLEEP: NOT AT ALL
2. FEELING DOWN, DEPRESSED OR HOPELESS: NOT AT ALL
8. MOVING OR SPEAKING SO SLOWLY THAT OTHER PEOPLE COULD HAVE NOTICED. OR THE OPPOSITE, BEING SO FIGETY OR RESTLESS THAT YOU HAVE BEEN MOVING AROUND A LOT MORE THAN USUAL: NOT AT ALL
6. FEELING BAD ABOUT YOURSELF - OR THAT YOU ARE A FAILURE OR HAVE LET YOURSELF OR YOUR FAMILY DOWN: NOT AT ALL
SUM OF ALL RESPONSES TO PHQ QUESTIONS 1-9: 0

## 2024-11-23 ASSESSMENT — LIFESTYLE VARIABLES
HOW MANY STANDARD DRINKS CONTAINING ALCOHOL DO YOU HAVE ON A TYPICAL DAY: 1 OR 2
HOW OFTEN DO YOU HAVE A DRINK CONTAINING ALCOHOL: 2
HOW OFTEN DO YOU HAVE SIX OR MORE DRINKS ON ONE OCCASION: 1
HOW MANY STANDARD DRINKS CONTAINING ALCOHOL DO YOU HAVE ON A TYPICAL DAY: 1
HOW OFTEN DO YOU HAVE A DRINK CONTAINING ALCOHOL: MONTHLY OR LESS

## 2024-11-26 ENCOUNTER — OFFICE VISIT (OUTPATIENT)
Dept: INTERNAL MEDICINE CLINIC | Age: 74
End: 2024-11-26

## 2024-11-26 VITALS
BODY MASS INDEX: 38.83 KG/M2 | SYSTOLIC BLOOD PRESSURE: 118 MMHG | OXYGEN SATURATION: 98 % | WEIGHT: 211 LBS | HEIGHT: 62 IN | HEART RATE: 73 BPM | DIASTOLIC BLOOD PRESSURE: 78 MMHG

## 2024-11-26 DIAGNOSIS — E55.9 VITAMIN D DEFICIENCY: ICD-10-CM

## 2024-11-26 DIAGNOSIS — F41.9 ANXIETY AND DEPRESSION: ICD-10-CM

## 2024-11-26 DIAGNOSIS — F32.A ANXIETY AND DEPRESSION: ICD-10-CM

## 2024-11-26 DIAGNOSIS — E78.49 OTHER HYPERLIPIDEMIA: ICD-10-CM

## 2024-11-26 DIAGNOSIS — I10 PRIMARY HYPERTENSION: ICD-10-CM

## 2024-11-26 DIAGNOSIS — E03.9 HYPOTHYROIDISM, UNSPECIFIED TYPE: Primary | ICD-10-CM

## 2024-11-26 DIAGNOSIS — R73.03 PREDIABETES: ICD-10-CM

## 2024-11-26 DIAGNOSIS — Z00.00 MEDICARE ANNUAL WELLNESS VISIT, SUBSEQUENT: ICD-10-CM

## 2024-11-26 NOTE — PATIENT INSTRUCTIONS
Instructions.\"  Current as of: September 20, 2023  Content Version: 14.2  © 2024 [email or web address].   Care instructions adapted under license by [employer]. If you have questions about a medical condition or this instruction, always ask your healthcare professional. [employer] disclaims any warranty or liability for your use of this information.           A Healthy Heart: Care Instructions  Overview     Coronary artery disease, also called heart disease, occurs when a substance called plaque builds up in the vessels that supply oxygen-rich blood to your heart muscle. This can narrow the blood vessels and reduce blood flow. A heart attack happens when blood flow is completely blocked. A high-fat diet, smoking, and other factors increase the risk of heart disease.  Your doctor has found that you have a chance of having heart disease. A heart-healthy lifestyle can help keep your heart healthy and prevent heart disease. This lifestyle includes eating healthy, being active, staying at a weight that's healthy for you, and not smoking or using tobacco. It also includes taking medicines as directed, managing other health conditions, and trying to get a healthy amount of sleep.  Follow-up care is a key part of your treatment and safety. Be sure to make and go to all appointments, and call your doctor if you are having problems. It's also a good idea to know your test results and keep a list of the medicines you take.  How can you care for yourself at home?  Diet    Use less salt when you cook and eat. This helps lower your blood pressure. Taste food before salting. Add only a little salt when you think you need it. With time, your taste buds will adjust to less salt.     Eat fewer snack items, fast foods, canned soups, and other high-salt, high-fat, processed foods.     Read food labels and try to avoid saturated and trans fats. They increase your risk of heart disease by raising cholesterol levels.

## 2024-11-26 NOTE — PROGRESS NOTES
Medicare Annual Wellness Visit    Yovana Devine is here for Follow-up and Medicare AWV    Assessment & Plan   Hypothyroidism, unspecified type  TSH now normalized.  Continue current dose of synthroid.  -     TSH; Future  Medicare annual wellness visit, subsequent  Anxiety and depression  Stable, well controlled.  Continue current medications.   Primary hypertension  Stable, well controlled.  Continue current medications.   -     CBC with Auto Differential; Future  -     Comprehensive Metabolic Panel; Future  Other hyperlipidemia  Lipid panel stable, continue lipitor.  -     Lipid Panel; Future  Vitamin D deficiency  Instructed to start taking daily OTC supplement.   -     Vitamin D 25 Hydroxy; Future  Prediabetes  Encouraged lifestyle modifications, will monitor.   -     Hemoglobin A1C; Future    Recommendations for Preventive Services Due: see orders and patient instructions/AVS.  Recommended screening schedule for the next 5-10 years is provided to the patient in written form: see Patient Instructions/AVS.     Return in 6 months (on 5/26/2025).     Subjective   The following acute and/or chronic problems were also addressed today:    Depression:  Symptoms still doing well with medication.       Hypothyroidism:  Synthroid dose was adjusted at last appointment.       HLP:  Compliant with lipitor.     HTN:  BP's normal.     Prediabetes:  States she lost almost 10 lbs on their cruise due to extensive walking, but was also eating more sweets than usual.  States she has gained some of the weight back since being back home.       Otherwise feeling well today, no acute concerns.  Labs reviewed.         Patient's complete Health Risk Assessment and screening values have been reviewed and are found in Flowsheets. The following problems were reviewed today and where indicated follow up appointments were made and/or referrals ordered.    Positive Risk Factor Screenings with Interventions:    Fall Risk:  Do you feel unsteady

## 2024-12-02 DIAGNOSIS — F32.A ANXIETY AND DEPRESSION: ICD-10-CM

## 2024-12-02 DIAGNOSIS — E03.9 HYPOTHYROIDISM, UNSPECIFIED TYPE: ICD-10-CM

## 2024-12-02 DIAGNOSIS — F41.9 ANXIETY AND DEPRESSION: ICD-10-CM

## 2024-12-02 RX ORDER — LEVOTHYROXINE SODIUM 100 UG/1
100 TABLET ORAL DAILY
Qty: 90 TABLET | Refills: 1 | Status: SHIPPED | OUTPATIENT
Start: 2024-12-02

## 2024-12-02 RX ORDER — HYDROCHLOROTHIAZIDE 12.5 MG/1
CAPSULE ORAL
Qty: 90 CAPSULE | Refills: 1 | Status: SHIPPED | OUTPATIENT
Start: 2024-12-02

## 2024-12-02 RX ORDER — LOSARTAN POTASSIUM 25 MG/1
TABLET ORAL
Qty: 90 TABLET | Refills: 1 | Status: SHIPPED | OUTPATIENT
Start: 2024-12-02

## 2024-12-05 ENCOUNTER — HOSPITAL ENCOUNTER (OUTPATIENT)
Dept: INFUSION THERAPY | Age: 74
Setting detail: INFUSION SERIES
Discharge: HOME | End: 2024-12-05
Payer: MEDICARE

## 2024-12-05 VITALS
BODY MASS INDEX: 38.58 KG/M2 | OXYGEN SATURATION: 96 % | TEMPERATURE: 97.2 F | DIASTOLIC BLOOD PRESSURE: 62 MMHG | HEART RATE: 82 BPM | WEIGHT: 211 LBS | RESPIRATION RATE: 16 BRPM | SYSTOLIC BLOOD PRESSURE: 155 MMHG

## 2024-12-05 DIAGNOSIS — K51.911 ULCERATIVE COLITIS WITH RECTAL BLEEDING, UNSPECIFIED LOCATION (HCC): Primary | ICD-10-CM

## 2024-12-05 PROCEDURE — 96365 THER/PROPH/DIAG IV INF INIT: CPT

## 2024-12-05 PROCEDURE — 2580000003 HC RX 258: Performed by: INTERNAL MEDICINE

## 2024-12-05 PROCEDURE — 6360000002 HC RX W HCPCS: Performed by: INTERNAL MEDICINE

## 2024-12-05 RX ORDER — SODIUM CHLORIDE 0.9 % (FLUSH) 0.9 %
5-40 SYRINGE (ML) INJECTION PRN
OUTPATIENT
Start: 2024-12-05

## 2024-12-05 RX ORDER — SODIUM CHLORIDE 9 MG/ML
INJECTION, SOLUTION INTRAVENOUS CONTINUOUS
OUTPATIENT
Start: 2024-12-05

## 2024-12-05 RX ORDER — ALBUTEROL SULFATE 90 UG/1
4 INHALANT RESPIRATORY (INHALATION) PRN
OUTPATIENT
Start: 2024-12-05

## 2024-12-05 RX ORDER — ONDANSETRON 2 MG/ML
8 INJECTION INTRAMUSCULAR; INTRAVENOUS
OUTPATIENT
Start: 2024-12-05

## 2024-12-05 RX ORDER — HYDROCORTISONE SODIUM SUCCINATE 100 MG/2ML
100 INJECTION INTRAMUSCULAR; INTRAVENOUS
OUTPATIENT
Start: 2024-12-05

## 2024-12-05 RX ORDER — SODIUM CHLORIDE 0.9 % (FLUSH) 0.9 %
5-40 SYRINGE (ML) INJECTION PRN
Status: DISCONTINUED | OUTPATIENT
Start: 2024-12-05 | End: 2024-12-06 | Stop reason: HOSPADM

## 2024-12-05 RX ORDER — DIPHENHYDRAMINE HYDROCHLORIDE 50 MG/ML
50 INJECTION INTRAMUSCULAR; INTRAVENOUS
OUTPATIENT
Start: 2024-12-05

## 2024-12-05 RX ORDER — EPINEPHRINE 1 MG/ML
0.3 INJECTION, SOLUTION INTRAMUSCULAR; SUBCUTANEOUS PRN
OUTPATIENT
Start: 2024-12-05

## 2024-12-05 RX ORDER — FAMOTIDINE 10 MG/ML
20 INJECTION, SOLUTION INTRAVENOUS
OUTPATIENT
Start: 2024-12-05

## 2024-12-05 RX ORDER — ACETAMINOPHEN 325 MG/1
650 TABLET ORAL
OUTPATIENT
Start: 2024-12-05

## 2024-12-05 RX ADMIN — SODIUM CHLORIDE, PRESERVATIVE FREE 10 ML: 5 INJECTION INTRAVENOUS at 10:20

## 2024-12-05 RX ADMIN — INFLIXIMAB 500 MG: 100 INJECTION, POWDER, LYOPHILIZED, FOR SOLUTION INTRAVENOUS at 09:20

## 2024-12-05 RX ADMIN — SODIUM CHLORIDE, PRESERVATIVE FREE 10 ML: 5 INJECTION INTRAVENOUS at 09:10

## 2024-12-05 NOTE — DISCHARGE INSTRUCTIONS
Continue same medications, diet, and activity.        Seek medical help for signs and symptoms of an allergic reaction; hives, chills, rash,chest pain, difficulty breathing, or swelling of face and/or throat.      Talk to your Doctor before receiving any vaccinations or starting any new medicines.         Thank you for choosing [facility]. It is our pleasure to serve you.        [facility]patient [facility]   [phone]  8AM-5PM

## 2024-12-05 NOTE — PROGRESS NOTES
Ambulatory to unit room 3 for Renflexis.Orientated to unit.Procedure and plan of care explained.Questions answered.Understanding verbalized.Tolerated  well.Reviewed discharge instructions, understanding verbalized.Copies of AVS declined to take home. Patient discharged home.Down to exit per self.    Orders Placed This Encounter   Medications    inFLIXimab-abda (RENFLEXIS) 500 mg in sodium chloride 0.9 % 250 mL IVPB    sodium chloride flush 0.9 % injection 5-40 mL

## 2025-01-22 RX ORDER — ACETAMINOPHEN 325 MG/1
650 TABLET ORAL
Status: CANCELLED | OUTPATIENT
Start: 2025-01-30

## 2025-01-22 RX ORDER — ONDANSETRON 2 MG/ML
8 INJECTION INTRAMUSCULAR; INTRAVENOUS
Status: CANCELLED | OUTPATIENT
Start: 2025-01-30

## 2025-01-22 RX ORDER — FAMOTIDINE 10 MG/ML
20 INJECTION, SOLUTION INTRAVENOUS
Status: CANCELLED | OUTPATIENT
Start: 2025-01-30

## 2025-01-22 RX ORDER — DIPHENHYDRAMINE HYDROCHLORIDE 50 MG/ML
50 INJECTION INTRAMUSCULAR; INTRAVENOUS
Status: CANCELLED | OUTPATIENT
Start: 2025-01-30

## 2025-01-22 RX ORDER — SODIUM CHLORIDE 9 MG/ML
INJECTION, SOLUTION INTRAVENOUS CONTINUOUS
Status: CANCELLED | OUTPATIENT
Start: 2025-01-30

## 2025-01-22 RX ORDER — EPINEPHRINE 1 MG/ML
0.3 INJECTION, SOLUTION INTRAMUSCULAR; SUBCUTANEOUS PRN
Status: CANCELLED | OUTPATIENT
Start: 2025-01-30

## 2025-01-22 RX ORDER — ALBUTEROL SULFATE 90 UG/1
4 INHALANT RESPIRATORY (INHALATION) PRN
Status: CANCELLED | OUTPATIENT
Start: 2025-01-30

## 2025-01-22 RX ORDER — HYDROCORTISONE SODIUM SUCCINATE 100 MG/2ML
100 INJECTION INTRAMUSCULAR; INTRAVENOUS
Status: CANCELLED | OUTPATIENT
Start: 2025-01-30

## 2025-01-22 RX ORDER — SODIUM CHLORIDE 0.9 % (FLUSH) 0.9 %
5-40 SYRINGE (ML) INJECTION PRN
Status: CANCELLED | OUTPATIENT
Start: 2025-01-30

## 2025-01-30 ENCOUNTER — HOSPITAL ENCOUNTER (OUTPATIENT)
Dept: INFUSION THERAPY | Age: 75
Setting detail: INFUSION SERIES
Discharge: HOME | End: 2025-01-30
Payer: MEDICARE

## 2025-01-30 VITALS
HEART RATE: 74 BPM | OXYGEN SATURATION: 96 % | RESPIRATION RATE: 16 BRPM | DIASTOLIC BLOOD PRESSURE: 58 MMHG | TEMPERATURE: 97.2 F | SYSTOLIC BLOOD PRESSURE: 141 MMHG

## 2025-01-30 DIAGNOSIS — K51.911 ULCERATIVE COLITIS WITH RECTAL BLEEDING, UNSPECIFIED LOCATION (HCC): Primary | ICD-10-CM

## 2025-01-30 PROCEDURE — 2500000003 HC RX 250 WO HCPCS: Performed by: INTERNAL MEDICINE

## 2025-01-30 PROCEDURE — 96365 THER/PROPH/DIAG IV INF INIT: CPT

## 2025-01-30 PROCEDURE — 2580000003 HC RX 258: Performed by: INTERNAL MEDICINE

## 2025-01-30 PROCEDURE — 6360000002 HC RX W HCPCS: Performed by: INTERNAL MEDICINE

## 2025-01-30 PROCEDURE — 96413 CHEMO IV INFUSION 1 HR: CPT

## 2025-01-30 RX ORDER — ONDANSETRON 2 MG/ML
8 INJECTION INTRAMUSCULAR; INTRAVENOUS
OUTPATIENT
Start: 2025-03-27

## 2025-01-30 RX ORDER — ACETAMINOPHEN 325 MG/1
650 TABLET ORAL
OUTPATIENT
Start: 2025-03-27

## 2025-01-30 RX ORDER — SODIUM CHLORIDE 9 MG/ML
INJECTION, SOLUTION INTRAVENOUS CONTINUOUS
OUTPATIENT
Start: 2025-03-27

## 2025-01-30 RX ORDER — SODIUM CHLORIDE 0.9 % (FLUSH) 0.9 %
5-40 SYRINGE (ML) INJECTION PRN
Status: DISCONTINUED | OUTPATIENT
Start: 2025-01-30 | End: 2025-01-31 | Stop reason: HOSPADM

## 2025-01-30 RX ORDER — ALBUTEROL SULFATE 90 UG/1
4 INHALANT RESPIRATORY (INHALATION) PRN
OUTPATIENT
Start: 2025-03-27

## 2025-01-30 RX ORDER — SODIUM CHLORIDE 0.9 % (FLUSH) 0.9 %
5-40 SYRINGE (ML) INJECTION PRN
OUTPATIENT
Start: 2025-03-27

## 2025-01-30 RX ORDER — FAMOTIDINE 10 MG/ML
20 INJECTION, SOLUTION INTRAVENOUS
OUTPATIENT
Start: 2025-03-27

## 2025-01-30 RX ORDER — HYDROCORTISONE SODIUM SUCCINATE 100 MG/2ML
100 INJECTION INTRAMUSCULAR; INTRAVENOUS
OUTPATIENT
Start: 2025-03-27

## 2025-01-30 RX ORDER — DIPHENHYDRAMINE HYDROCHLORIDE 50 MG/ML
50 INJECTION INTRAMUSCULAR; INTRAVENOUS
OUTPATIENT
Start: 2025-03-27

## 2025-01-30 RX ORDER — EPINEPHRINE 1 MG/ML
0.3 INJECTION, SOLUTION INTRAMUSCULAR; SUBCUTANEOUS PRN
OUTPATIENT
Start: 2025-03-27

## 2025-01-30 RX ADMIN — SODIUM CHLORIDE, PRESERVATIVE FREE 10 ML: 5 INJECTION INTRAVENOUS at 09:20

## 2025-01-30 RX ADMIN — SODIUM CHLORIDE, PRESERVATIVE FREE 10 ML: 5 INJECTION INTRAVENOUS at 11:27

## 2025-01-30 RX ADMIN — INFLIXIMAB 500 MG: 100 INJECTION, POWDER, LYOPHILIZED, FOR SOLUTION INTRAVENOUS at 10:24

## 2025-01-30 NOTE — DISCHARGE INSTRUCTIONS
Continue same medications, diet, and activity.        Seek medical help for signs and symptoms of an allergic reaction; hives, chills, rash,chest pain, difficulty breathing, or swelling of face and/or throat.      Talk to your Doctor before receiving any vaccinations or starting any new medicines.      Thank you for choosing [facility]. It is our pleasure to serve you.        [facility]patient [facility]   [phone]  8AM-5PM

## 2025-03-27 ENCOUNTER — HOSPITAL ENCOUNTER (OUTPATIENT)
Dept: INFUSION THERAPY | Age: 75
Setting detail: INFUSION SERIES
Discharge: HOME | End: 2025-03-27
Payer: MEDICARE

## 2025-03-27 VITALS
TEMPERATURE: 97.1 F | BODY MASS INDEX: 37.67 KG/M2 | SYSTOLIC BLOOD PRESSURE: 146 MMHG | WEIGHT: 206 LBS | DIASTOLIC BLOOD PRESSURE: 55 MMHG | OXYGEN SATURATION: 96 % | RESPIRATION RATE: 16 BRPM | HEART RATE: 79 BPM

## 2025-03-27 DIAGNOSIS — K51.911 ULCERATIVE COLITIS WITH RECTAL BLEEDING, UNSPECIFIED LOCATION (HCC): Primary | ICD-10-CM

## 2025-03-27 PROCEDURE — 96365 THER/PROPH/DIAG IV INF INIT: CPT

## 2025-03-27 PROCEDURE — 96413 CHEMO IV INFUSION 1 HR: CPT

## 2025-03-27 PROCEDURE — 2500000003 HC RX 250 WO HCPCS: Performed by: INTERNAL MEDICINE

## 2025-03-27 PROCEDURE — 6360000002 HC RX W HCPCS: Performed by: INTERNAL MEDICINE

## 2025-03-27 PROCEDURE — 2580000003 HC RX 258: Performed by: INTERNAL MEDICINE

## 2025-03-27 RX ORDER — EPINEPHRINE 1 MG/ML
0.3 INJECTION, SOLUTION INTRAMUSCULAR; SUBCUTANEOUS PRN
OUTPATIENT
Start: 2025-05-22

## 2025-03-27 RX ORDER — ONDANSETRON 2 MG/ML
8 INJECTION INTRAMUSCULAR; INTRAVENOUS
OUTPATIENT
Start: 2025-05-22

## 2025-03-27 RX ORDER — SODIUM CHLORIDE 9 MG/ML
INJECTION, SOLUTION INTRAVENOUS CONTINUOUS
OUTPATIENT
Start: 2025-05-22

## 2025-03-27 RX ORDER — SODIUM CHLORIDE 0.9 % (FLUSH) 0.9 %
5-40 SYRINGE (ML) INJECTION PRN
Status: DISCONTINUED | OUTPATIENT
Start: 2025-03-27 | End: 2025-03-28 | Stop reason: HOSPADM

## 2025-03-27 RX ORDER — FAMOTIDINE 10 MG/ML
20 INJECTION, SOLUTION INTRAVENOUS
OUTPATIENT
Start: 2025-05-22

## 2025-03-27 RX ORDER — SODIUM CHLORIDE 0.9 % (FLUSH) 0.9 %
5-40 SYRINGE (ML) INJECTION PRN
OUTPATIENT
Start: 2025-05-22

## 2025-03-27 RX ORDER — HYDROCORTISONE SODIUM SUCCINATE 100 MG/2ML
100 INJECTION INTRAMUSCULAR; INTRAVENOUS
OUTPATIENT
Start: 2025-05-22

## 2025-03-27 RX ORDER — DIPHENHYDRAMINE HYDROCHLORIDE 50 MG/ML
50 INJECTION, SOLUTION INTRAMUSCULAR; INTRAVENOUS
OUTPATIENT
Start: 2025-05-22

## 2025-03-27 RX ORDER — ACETAMINOPHEN 325 MG/1
650 TABLET ORAL
OUTPATIENT
Start: 2025-05-22

## 2025-03-27 RX ORDER — ALBUTEROL SULFATE 90 UG/1
4 INHALANT RESPIRATORY (INHALATION) PRN
OUTPATIENT
Start: 2025-05-22

## 2025-03-27 RX ADMIN — INFLIXIMAB 500 MG: 100 INJECTION, POWDER, LYOPHILIZED, FOR SOLUTION INTRAVENOUS at 09:40

## 2025-03-27 RX ADMIN — SODIUM CHLORIDE, PRESERVATIVE FREE 10 ML: 5 INJECTION INTRAVENOUS at 10:50

## 2025-03-27 RX ADMIN — SODIUM CHLORIDE, PRESERVATIVE FREE 10 ML: 5 INJECTION INTRAVENOUS at 09:11

## 2025-03-27 NOTE — DISCHARGE INSTRUCTIONS
Continue same medications, diet, and activity.        Seek medical help for signs and symptoms of an allergic reaction; hives, chills, rash,chest pain, difficulty breathing, or swelling of face and/or throat.      Talk to your Doctor before receiving any vaccinations or starting any new medicines.        Thank you for choosing [facility]. It is our pleasure to serve you.        Outpatient [facility]fusion Center   [phone]  8AM-5PM

## 2025-05-05 RX ORDER — ATORVASTATIN CALCIUM 20 MG/1
20 TABLET, FILM COATED ORAL DAILY
Qty: 90 TABLET | Refills: 1 | Status: SHIPPED | OUTPATIENT
Start: 2025-05-05

## 2025-05-20 ENCOUNTER — HOSPITAL ENCOUNTER (OUTPATIENT)
Age: 75
Discharge: HOME | End: 2025-05-20
Payer: MEDICARE

## 2025-05-20 DIAGNOSIS — E03.9 HYPOTHYROIDISM, UNSPECIFIED TYPE: ICD-10-CM

## 2025-05-20 DIAGNOSIS — E55.9 VITAMIN D DEFICIENCY: ICD-10-CM

## 2025-05-20 DIAGNOSIS — R73.03 PREDIABETES: ICD-10-CM

## 2025-05-20 DIAGNOSIS — I10 PRIMARY HYPERTENSION: ICD-10-CM

## 2025-05-20 DIAGNOSIS — E78.49 OTHER HYPERLIPIDEMIA: ICD-10-CM

## 2025-05-20 LAB
25(OH)D3 SERPL-MCNC: 21.5 NG/ML (ref 30–150)
ALBUMIN SERPL-MCNC: 4.4 G/DL (ref 3.4–5)
ALBUMIN/GLOB SERPL: 1.6 {RATIO} (ref 1.1–2.2)
ALP SERPL-CCNC: 58 U/L (ref 40–129)
ALT SERPL-CCNC: 26 U/L (ref 10–40)
ANION GAP SERPL CALCULATED.3IONS-SCNC: 11 MMOL/L (ref 9–17)
AST SERPL-CCNC: 26 U/L (ref 15–37)
BASOPHILS # BLD: 0.05 K/UL
BASOPHILS NFR BLD: 1 % (ref 0–1)
BILIRUB SERPL-MCNC: 0.6 MG/DL (ref 0–1)
BUN SERPL-MCNC: 12 MG/DL (ref 7–20)
CALCIUM SERPL-MCNC: 9.6 MG/DL (ref 8.3–10.6)
CHLORIDE SERPL-SCNC: 98 MMOL/L (ref 99–110)
CHOLEST SERPL-MCNC: 140 MG/DL (ref 125–199)
CO2 SERPL-SCNC: 28 MMOL/L (ref 21–32)
CREAT SERPL-MCNC: 0.8 MG/DL (ref 0.6–1.2)
EOSINOPHIL # BLD: 0.16 K/UL
EOSINOPHILS RELATIVE PERCENT: 4 % (ref 0–3)
ERYTHROCYTE [DISTWIDTH] IN BLOOD BY AUTOMATED COUNT: 13.2 % (ref 11.7–14.9)
EST. AVERAGE GLUCOSE BLD GHB EST-MCNC: 125 MG/DL
GFR, ESTIMATED: 73 ML/MIN/1.73M2
GLUCOSE SERPL-MCNC: 98 MG/DL (ref 74–99)
HBA1C MFR BLD: 6 % (ref 4.2–6.3)
HCT VFR BLD AUTO: 40.8 % (ref 37–47)
HDLC SERPL-MCNC: 65 MG/DL
HGB BLD-MCNC: 13.4 G/DL (ref 12.5–16)
IMM GRANULOCYTES # BLD AUTO: 0.03 K/UL
IMM GRANULOCYTES NFR BLD: 1 %
LDLC SERPL CALC-MCNC: 51 MG/DL
LYMPHOCYTES NFR BLD: 1.38 K/UL
LYMPHOCYTES RELATIVE PERCENT: 31 % (ref 24–44)
MCH RBC QN AUTO: 31.1 PG (ref 27–31)
MCHC RBC AUTO-ENTMCNC: 32.8 G/DL (ref 32–36)
MCV RBC AUTO: 94.7 FL (ref 78–100)
MONOCYTES NFR BLD: 0.78 K/UL
MONOCYTES NFR BLD: 17 % (ref 0–5)
NEUTROPHILS NFR BLD: 47 % (ref 36–66)
NEUTS SEG NFR BLD: 2.11 K/UL
PLATELET # BLD AUTO: 272 K/UL (ref 140–440)
PMV BLD AUTO: 9.4 FL (ref 7.5–11.1)
POTASSIUM SERPL-SCNC: 4.4 MMOL/L (ref 3.5–5.1)
PROT SERPL-MCNC: 7.2 G/DL (ref 6.4–8.2)
RBC # BLD AUTO: 4.31 M/UL (ref 4.2–5.4)
SODIUM SERPL-SCNC: 137 MMOL/L (ref 136–145)
TRIGL SERPL-MCNC: 119 MG/DL
TSH SERPL DL<=0.05 MIU/L-ACNC: 0.48 UIU/ML (ref 0.27–4.2)
WBC OTHER # BLD: 4.5 K/UL (ref 4–10.5)

## 2025-05-20 PROCEDURE — 83036 HEMOGLOBIN GLYCOSYLATED A1C: CPT

## 2025-05-20 PROCEDURE — 80053 COMPREHEN METABOLIC PANEL: CPT

## 2025-05-20 PROCEDURE — 84443 ASSAY THYROID STIM HORMONE: CPT

## 2025-05-20 PROCEDURE — 82306 VITAMIN D 25 HYDROXY: CPT

## 2025-05-20 PROCEDURE — 80061 LIPID PANEL: CPT

## 2025-05-20 PROCEDURE — 85025 COMPLETE CBC W/AUTO DIFF WBC: CPT

## 2025-05-20 PROCEDURE — 36415 COLL VENOUS BLD VENIPUNCTURE: CPT

## 2025-05-22 ENCOUNTER — HOSPITAL ENCOUNTER (OUTPATIENT)
Dept: INFUSION THERAPY | Age: 75
Setting detail: INFUSION SERIES
Discharge: HOME | End: 2025-05-22
Payer: MEDICARE

## 2025-05-22 VITALS
HEART RATE: 71 BPM | BODY MASS INDEX: 37.67 KG/M2 | DIASTOLIC BLOOD PRESSURE: 63 MMHG | WEIGHT: 206 LBS | OXYGEN SATURATION: 97 % | SYSTOLIC BLOOD PRESSURE: 121 MMHG | RESPIRATION RATE: 16 BRPM

## 2025-05-22 DIAGNOSIS — K51.911 ULCERATIVE COLITIS WITH RECTAL BLEEDING, UNSPECIFIED LOCATION (HCC): Primary | ICD-10-CM

## 2025-05-22 PROCEDURE — 2500000003 HC RX 250 WO HCPCS: Performed by: INTERNAL MEDICINE

## 2025-05-22 PROCEDURE — 96365 THER/PROPH/DIAG IV INF INIT: CPT

## 2025-05-22 PROCEDURE — 96413 CHEMO IV INFUSION 1 HR: CPT

## 2025-05-22 PROCEDURE — 2580000003 HC RX 258: Performed by: INTERNAL MEDICINE

## 2025-05-22 PROCEDURE — 6360000002 HC RX W HCPCS: Performed by: INTERNAL MEDICINE

## 2025-05-22 RX ORDER — SODIUM CHLORIDE 9 MG/ML
INJECTION, SOLUTION INTRAVENOUS CONTINUOUS
OUTPATIENT
Start: 2025-07-17

## 2025-05-22 RX ORDER — SODIUM CHLORIDE 0.9 % (FLUSH) 0.9 %
5-40 SYRINGE (ML) INJECTION PRN
Status: DISCONTINUED | OUTPATIENT
Start: 2025-05-22 | End: 2025-05-23 | Stop reason: HOSPADM

## 2025-05-22 RX ORDER — DIPHENHYDRAMINE HYDROCHLORIDE 50 MG/ML
50 INJECTION, SOLUTION INTRAMUSCULAR; INTRAVENOUS
OUTPATIENT
Start: 2025-07-17

## 2025-05-22 RX ORDER — ALBUTEROL SULFATE 90 UG/1
4 INHALANT RESPIRATORY (INHALATION) PRN
OUTPATIENT
Start: 2025-07-17

## 2025-05-22 RX ORDER — HYDROCORTISONE SODIUM SUCCINATE 100 MG/2ML
100 INJECTION INTRAMUSCULAR; INTRAVENOUS
OUTPATIENT
Start: 2025-07-17

## 2025-05-22 RX ORDER — SODIUM CHLORIDE 0.9 % (FLUSH) 0.9 %
5-40 SYRINGE (ML) INJECTION PRN
OUTPATIENT
Start: 2025-07-17

## 2025-05-22 RX ORDER — EPINEPHRINE 1 MG/ML
0.3 INJECTION, SOLUTION INTRAMUSCULAR; SUBCUTANEOUS PRN
OUTPATIENT
Start: 2025-07-17

## 2025-05-22 RX ORDER — FAMOTIDINE 10 MG/ML
20 INJECTION, SOLUTION INTRAVENOUS
OUTPATIENT
Start: 2025-07-17

## 2025-05-22 RX ORDER — ONDANSETRON 2 MG/ML
8 INJECTION INTRAMUSCULAR; INTRAVENOUS
OUTPATIENT
Start: 2025-07-17

## 2025-05-22 RX ORDER — ACETAMINOPHEN 325 MG/1
650 TABLET ORAL
OUTPATIENT
Start: 2025-07-17

## 2025-05-22 RX ADMIN — SODIUM CHLORIDE, PRESERVATIVE FREE 10 ML: 5 INJECTION INTRAVENOUS at 09:20

## 2025-05-22 RX ADMIN — SODIUM CHLORIDE, PRESERVATIVE FREE 10 ML: 5 INJECTION INTRAVENOUS at 11:00

## 2025-05-22 RX ADMIN — INFLIXIMAB 500 MG: 100 INJECTION, POWDER, LYOPHILIZED, FOR SOLUTION INTRAVENOUS at 09:54

## 2025-05-22 NOTE — DISCHARGE INSTRUCTIONS
Continue same medications, diet, and activity.        Seek medical help for signs and symptoms of an allergic reaction; hives, chills, rash,chest pain, difficulty breathing, or swelling of face and/or throat.      Talk to your Doctor before receiving any vaccinations or starting any new medicines.        Thank you for choosing [facility]. It is our pleasure to serve you.        Outpatient Infusion Center   [phone]  8AM-5PM

## 2025-05-24 SDOH — ECONOMIC STABILITY: FOOD INSECURITY: WITHIN THE PAST 12 MONTHS, THE FOOD YOU BOUGHT JUST DIDN'T LAST AND YOU DIDN'T HAVE MONEY TO GET MORE.: NEVER TRUE

## 2025-05-24 SDOH — ECONOMIC STABILITY: FOOD INSECURITY: WITHIN THE PAST 12 MONTHS, YOU WORRIED THAT YOUR FOOD WOULD RUN OUT BEFORE YOU GOT MONEY TO BUY MORE.: NEVER TRUE

## 2025-05-24 SDOH — ECONOMIC STABILITY: TRANSPORTATION INSECURITY
IN THE PAST 12 MONTHS, HAS THE LACK OF TRANSPORTATION KEPT YOU FROM MEDICAL APPOINTMENTS OR FROM GETTING MEDICATIONS?: NO

## 2025-05-24 SDOH — ECONOMIC STABILITY: INCOME INSECURITY: IN THE LAST 12 MONTHS, WAS THERE A TIME WHEN YOU WERE NOT ABLE TO PAY THE MORTGAGE OR RENT ON TIME?: NO

## 2025-05-24 ASSESSMENT — PATIENT HEALTH QUESTIONNAIRE - PHQ9
SUM OF ALL RESPONSES TO PHQ QUESTIONS 1-9: 4
5. POOR APPETITE OR OVEREATING: NOT AT ALL
3. TROUBLE FALLING OR STAYING ASLEEP: NOT AT ALL
6. FEELING BAD ABOUT YOURSELF - OR THAT YOU ARE A FAILURE OR HAVE LET YOURSELF OR YOUR FAMILY DOWN: NOT AT ALL
SUM OF ALL RESPONSES TO PHQ QUESTIONS 1-9: 4
10. IF YOU CHECKED OFF ANY PROBLEMS, HOW DIFFICULT HAVE THESE PROBLEMS MADE IT FOR YOU TO DO YOUR WORK, TAKE CARE OF THINGS AT HOME, OR GET ALONG WITH OTHER PEOPLE: NOT DIFFICULT AT ALL
SUM OF ALL RESPONSES TO PHQ QUESTIONS 1-9: 4
4. FEELING TIRED OR HAVING LITTLE ENERGY: SEVERAL DAYS
SUM OF ALL RESPONSES TO PHQ QUESTIONS 1-9: 4
1. LITTLE INTEREST OR PLEASURE IN DOING THINGS: SEVERAL DAYS
9. THOUGHTS THAT YOU WOULD BE BETTER OFF DEAD, OR OF HURTING YOURSELF: NOT AT ALL
4. FEELING TIRED OR HAVING LITTLE ENERGY: SEVERAL DAYS
2. FEELING DOWN, DEPRESSED OR HOPELESS: SEVERAL DAYS
1. LITTLE INTEREST OR PLEASURE IN DOING THINGS: SEVERAL DAYS
3. TROUBLE FALLING OR STAYING ASLEEP: NOT AT ALL
9. THOUGHTS THAT YOU WOULD BE BETTER OFF DEAD, OR OF HURTING YOURSELF: NOT AT ALL
7. TROUBLE CONCENTRATING ON THINGS, SUCH AS READING THE NEWSPAPER OR WATCHING TELEVISION: SEVERAL DAYS
10. IF YOU CHECKED OFF ANY PROBLEMS, HOW DIFFICULT HAVE THESE PROBLEMS MADE IT FOR YOU TO DO YOUR WORK, TAKE CARE OF THINGS AT HOME, OR GET ALONG WITH OTHER PEOPLE: NOT DIFFICULT AT ALL
7. TROUBLE CONCENTRATING ON THINGS, SUCH AS READING THE NEWSPAPER OR WATCHING TELEVISION: SEVERAL DAYS
8. MOVING OR SPEAKING SO SLOWLY THAT OTHER PEOPLE COULD HAVE NOTICED. OR THE OPPOSITE, BEING SO FIGETY OR RESTLESS THAT YOU HAVE BEEN MOVING AROUND A LOT MORE THAN USUAL: NOT AT ALL
5. POOR APPETITE OR OVEREATING: NOT AT ALL
2. FEELING DOWN, DEPRESSED OR HOPELESS: SEVERAL DAYS
SUM OF ALL RESPONSES TO PHQ QUESTIONS 1-9: 4
6. FEELING BAD ABOUT YOURSELF - OR THAT YOU ARE A FAILURE OR HAVE LET YOURSELF OR YOUR FAMILY DOWN: NOT AT ALL
8. MOVING OR SPEAKING SO SLOWLY THAT OTHER PEOPLE COULD HAVE NOTICED. OR THE OPPOSITE - BEING SO FIDGETY OR RESTLESS THAT YOU HAVE BEEN MOVING AROUND A LOT MORE THAN USUAL: NOT AT ALL

## 2025-05-27 ENCOUNTER — OFFICE VISIT (OUTPATIENT)
Dept: INTERNAL MEDICINE CLINIC | Age: 75
End: 2025-05-27
Payer: MEDICARE

## 2025-05-27 VITALS
OXYGEN SATURATION: 98 % | HEIGHT: 62 IN | HEART RATE: 76 BPM | SYSTOLIC BLOOD PRESSURE: 136 MMHG | BODY MASS INDEX: 38.46 KG/M2 | WEIGHT: 209 LBS | DIASTOLIC BLOOD PRESSURE: 60 MMHG | RESPIRATION RATE: 16 BRPM

## 2025-05-27 DIAGNOSIS — I10 PRIMARY HYPERTENSION: ICD-10-CM

## 2025-05-27 DIAGNOSIS — R41.3 MEMORY CHANGES: Primary | ICD-10-CM

## 2025-05-27 DIAGNOSIS — J30.9 ALLERGIC RHINITIS, UNSPECIFIED SEASONALITY, UNSPECIFIED TRIGGER: ICD-10-CM

## 2025-05-27 DIAGNOSIS — F41.9 ANXIETY AND DEPRESSION: ICD-10-CM

## 2025-05-27 DIAGNOSIS — E55.9 VITAMIN D DEFICIENCY: ICD-10-CM

## 2025-05-27 DIAGNOSIS — F32.A ANXIETY AND DEPRESSION: ICD-10-CM

## 2025-05-27 DIAGNOSIS — E03.9 HYPOTHYROIDISM, UNSPECIFIED TYPE: ICD-10-CM

## 2025-05-27 DIAGNOSIS — E78.49 OTHER HYPERLIPIDEMIA: ICD-10-CM

## 2025-05-27 DIAGNOSIS — G47.33 OSA (OBSTRUCTIVE SLEEP APNEA): ICD-10-CM

## 2025-05-27 DIAGNOSIS — R73.03 PREDIABETES: ICD-10-CM

## 2025-05-27 PROCEDURE — 3075F SYST BP GE 130 - 139MM HG: CPT | Performed by: FAMILY MEDICINE

## 2025-05-27 PROCEDURE — G8428 CUR MEDS NOT DOCUMENT: HCPCS | Performed by: FAMILY MEDICINE

## 2025-05-27 PROCEDURE — 1090F PRES/ABSN URINE INCON ASSESS: CPT | Performed by: FAMILY MEDICINE

## 2025-05-27 PROCEDURE — G2211 COMPLEX E/M VISIT ADD ON: HCPCS | Performed by: FAMILY MEDICINE

## 2025-05-27 PROCEDURE — 1123F ACP DISCUSS/DSCN MKR DOCD: CPT | Performed by: FAMILY MEDICINE

## 2025-05-27 PROCEDURE — 99214 OFFICE O/P EST MOD 30 MIN: CPT | Performed by: FAMILY MEDICINE

## 2025-05-27 PROCEDURE — G8417 CALC BMI ABV UP PARAM F/U: HCPCS | Performed by: FAMILY MEDICINE

## 2025-05-27 PROCEDURE — 1036F TOBACCO NON-USER: CPT | Performed by: FAMILY MEDICINE

## 2025-05-27 PROCEDURE — G8399 PT W/DXA RESULTS DOCUMENT: HCPCS | Performed by: FAMILY MEDICINE

## 2025-05-27 PROCEDURE — 3078F DIAST BP <80 MM HG: CPT | Performed by: FAMILY MEDICINE

## 2025-05-27 RX ORDER — FLUTICASONE PROPIONATE 50 MCG
2 SPRAY, SUSPENSION (ML) NASAL DAILY
Qty: 16 ML | Refills: 5 | Status: SHIPPED | OUTPATIENT
Start: 2025-05-27 | End: 2025-05-27 | Stop reason: SDUPTHER

## 2025-05-27 RX ORDER — FLUTICASONE PROPIONATE 50 MCG
2 SPRAY, SUSPENSION (ML) NASAL DAILY
Qty: 16 ML | Refills: 5 | Status: SHIPPED | OUTPATIENT
Start: 2025-05-27

## 2025-05-27 ASSESSMENT — ENCOUNTER SYMPTOMS
CONSTIPATION: 0
ABDOMINAL PAIN: 0
SHORTNESS OF BREATH: 0
SORE THROAT: 0
COLOR CHANGE: 0
DIARRHEA: 0
VOMITING: 0
CHEST TIGHTNESS: 0

## 2025-05-27 NOTE — PATIENT INSTRUCTIONS
Start a daily vitamin D3 (at least 1000 IU daily).  Try holding your lipitor for the next 2-4 weeks to see if your symptoms improve.   If you don't notice a significant improvement in your memory, restart your lipitor.   If your memory IS better, let us know so that we can discuss an alternative medication.    Call the scheduling number if you want to schedule your brain MRI.  Let us know if you'd like a neurology referral.

## 2025-05-27 NOTE — PROGRESS NOTES
Subjective:      Chief Complaint   Patient presents with    6 Month Follow-Up    Memory Loss     C/o issues with memory more frequently over the past month       HPI:  Yovana Devine is a 74 y.o. female who presents today for follow up of chronic conditions as listed below.    Patient has been having concerns with her memory.  States she is having difficulty with word finding, couldn't remember the middle name of one of her grandchildren, etc.  Has not gotten lost but has had to question where she is going a few times.  States she has noticed symptoms for about a year but symptoms occur intermittently and seems all of her friends of similar age all have the same symptoms so has not been as concerned.      However, son suggested it may be due to lipitor and wanted her to get off of the medication.  Patient has been on lipitor for over 10 years (does not remember exactly when it was started).  Does not have any personal history of MI/CVA but states father  of MI in his 40's.      Does not take any vitamin D.    Otherwise has been doing well, labs reviewed.           Past Medical History:   Diagnosis Date    Anxiety and depression     Asthma     Colitis, ulcerative ([facility])     Jordin Omer    Hyperlipidemia     Hypertension     Hypothyroidism     Mild aortic stenosis 2018, echo    Obesity, Class III, BMI 40-49.9 (morbid obesity) ([facility])     Osteopenia 2017    Prediabetes     Rib fracture 2014    8  right side from syncope    Ulcerative colitis with rectal bleeding ([facility]) 2021        Past Surgical History:   Procedure Laterality Date    BREAST BIOPSY Left     twice; [place]    CARPAL TUNNEL RELEASE Right      SECTION      one    HYSTERECTOMY (CERVIX STATUS UNKNOWN)      HYSTERECTOMY, TOTAL ABDOMINAL (CERVIX REMOVED)      MARY BETH AND BSO (CERVIX REMOVED)      TONSILLECTOMY AND ADENOIDECTOMY      age 4    WRIST GANGLION EXCISION Left        Social History     Tobacco Use

## 2025-06-02 RX ORDER — LOSARTAN POTASSIUM 25 MG/1
25 TABLET ORAL NIGHTLY
Qty: 90 TABLET | Refills: 1 | Status: SHIPPED | OUTPATIENT
Start: 2025-06-02

## 2025-06-30 RX ORDER — HYDROCHLOROTHIAZIDE 12.5 MG/1
12.5 CAPSULE ORAL EVERY MORNING
Qty: 90 CAPSULE | Refills: 1 | Status: SHIPPED | OUTPATIENT
Start: 2025-06-30

## 2025-07-09 DIAGNOSIS — E03.9 HYPOTHYROIDISM, UNSPECIFIED TYPE: ICD-10-CM

## 2025-07-09 RX ORDER — LEVOTHYROXINE SODIUM 100 UG/1
100 TABLET ORAL DAILY
Qty: 90 TABLET | Refills: 1 | Status: SHIPPED | OUTPATIENT
Start: 2025-07-09

## 2025-07-17 ENCOUNTER — HOSPITAL ENCOUNTER (OUTPATIENT)
Dept: INFUSION THERAPY | Age: 75
Setting detail: INFUSION SERIES
Discharge: HOME | End: 2025-07-17
Payer: MEDICARE

## 2025-07-17 VITALS
HEART RATE: 70 BPM | DIASTOLIC BLOOD PRESSURE: 71 MMHG | RESPIRATION RATE: 16 BRPM | WEIGHT: 198 LBS | TEMPERATURE: 97.8 F | BODY MASS INDEX: 36.2 KG/M2 | OXYGEN SATURATION: 96 % | SYSTOLIC BLOOD PRESSURE: 159 MMHG

## 2025-07-17 DIAGNOSIS — K51.911 ULCERATIVE COLITIS WITH RECTAL BLEEDING, UNSPECIFIED LOCATION (HCC): Primary | ICD-10-CM

## 2025-07-17 PROCEDURE — 2580000003 HC RX 258: Performed by: INTERNAL MEDICINE

## 2025-07-17 PROCEDURE — 96365 THER/PROPH/DIAG IV INF INIT: CPT

## 2025-07-17 PROCEDURE — 96413 CHEMO IV INFUSION 1 HR: CPT

## 2025-07-17 PROCEDURE — 2500000003 HC RX 250 WO HCPCS: Performed by: INTERNAL MEDICINE

## 2025-07-17 PROCEDURE — 6360000002 HC RX W HCPCS: Performed by: INTERNAL MEDICINE

## 2025-07-17 RX ORDER — HYDROCORTISONE SODIUM SUCCINATE 100 MG/2ML
100 INJECTION INTRAMUSCULAR; INTRAVENOUS
OUTPATIENT
Start: 2025-09-11

## 2025-07-17 RX ORDER — SODIUM CHLORIDE 0.9 % (FLUSH) 0.9 %
5-40 SYRINGE (ML) INJECTION PRN
OUTPATIENT
Start: 2025-09-11

## 2025-07-17 RX ORDER — SODIUM CHLORIDE 9 MG/ML
INJECTION, SOLUTION INTRAVENOUS CONTINUOUS
OUTPATIENT
Start: 2025-09-11

## 2025-07-17 RX ORDER — FAMOTIDINE 10 MG/ML
20 INJECTION, SOLUTION INTRAVENOUS
OUTPATIENT
Start: 2025-09-11

## 2025-07-17 RX ORDER — ALBUTEROL SULFATE 90 UG/1
4 INHALANT RESPIRATORY (INHALATION) PRN
OUTPATIENT
Start: 2025-09-11

## 2025-07-17 RX ORDER — DIPHENHYDRAMINE HYDROCHLORIDE 50 MG/ML
50 INJECTION, SOLUTION INTRAMUSCULAR; INTRAVENOUS
OUTPATIENT
Start: 2025-09-11

## 2025-07-17 RX ORDER — EPINEPHRINE 1 MG/ML
0.3 INJECTION, SOLUTION INTRAMUSCULAR; SUBCUTANEOUS PRN
OUTPATIENT
Start: 2025-09-11

## 2025-07-17 RX ORDER — ACETAMINOPHEN 325 MG/1
650 TABLET ORAL
OUTPATIENT
Start: 2025-09-11

## 2025-07-17 RX ORDER — SODIUM CHLORIDE 0.9 % (FLUSH) 0.9 %
5-40 SYRINGE (ML) INJECTION PRN
Status: DISCONTINUED | OUTPATIENT
Start: 2025-07-17 | End: 2025-07-18 | Stop reason: HOSPADM

## 2025-07-17 RX ORDER — ONDANSETRON 2 MG/ML
8 INJECTION INTRAMUSCULAR; INTRAVENOUS
OUTPATIENT
Start: 2025-09-11

## 2025-07-17 RX ADMIN — SODIUM CHLORIDE, PRESERVATIVE FREE 10 ML: 5 INJECTION INTRAVENOUS at 10:35

## 2025-07-17 RX ADMIN — SODIUM CHLORIDE, PRESERVATIVE FREE 10 ML: 5 INJECTION INTRAVENOUS at 09:25

## 2025-07-17 RX ADMIN — INFLIXIMAB 400 MG: 100 INJECTION, POWDER, LYOPHILIZED, FOR SOLUTION INTRAVENOUS at 09:31

## 2025-07-17 NOTE — DISCHARGE INSTRUCTIONS
Continue same medications, diet, and activity.        Seek medical help for signs and symptoms of an allergic reaction; hives, chills, rash,chest pain, difficulty breathing, or swelling of face and/or throat.      Talk to your Doctor before receiving any vaccinations or starting any new medicines.         Thank you for choosing [facility]. It is our pleasure to serve you.        [facility]   [phone]  8AM-5PM

## 2025-07-17 NOTE — PROGRESS NOTES
Ambulatory to unit room 2 for Renflexis.Reorientated to unit.Procedure and plan of care explained.Questions answered.Understanding verbalized.Tolerated  well.Reviewed discharge instructions, understanding verbalized.Copies of AVS given to take home. Patient discharged home.Down to exit per self.    Orders Placed This Encounter   Medications    inFLIXimab-abda (RENFLEXIS) 400 mg in sodium chloride 0.9 % 250 mL IVPB    sodium chloride flush 0.9 % injection 5-40 mL

## 2025-09-02 RX ORDER — FLUOXETINE HYDROCHLORIDE 40 MG/1
40 CAPSULE ORAL DAILY
Qty: 90 CAPSULE | Refills: 0 | Status: SHIPPED | OUTPATIENT
Start: 2025-09-02